# Patient Record
Sex: MALE | Race: BLACK OR AFRICAN AMERICAN | NOT HISPANIC OR LATINO | Employment: OTHER | URBAN - METROPOLITAN AREA
[De-identification: names, ages, dates, MRNs, and addresses within clinical notes are randomized per-mention and may not be internally consistent; named-entity substitution may affect disease eponyms.]

---

## 2017-09-07 ENCOUNTER — GENERIC CONVERSION - ENCOUNTER (OUTPATIENT)
Dept: OTHER | Facility: OTHER | Age: 62
End: 2017-09-07

## 2017-09-16 ENCOUNTER — HOSPITAL ENCOUNTER (EMERGENCY)
Facility: HOSPITAL | Age: 62
Discharge: HOME | End: 2017-09-16
Admitting: EMERGENCY MEDICINE
Payer: COMMERCIAL

## 2017-09-16 VITALS
DIASTOLIC BLOOD PRESSURE: 83 MMHG | WEIGHT: 260 LBS | OXYGEN SATURATION: 94 % | RESPIRATION RATE: 16 BRPM | HEART RATE: 96 BPM | SYSTOLIC BLOOD PRESSURE: 142 MMHG | TEMPERATURE: 97.8 F

## 2017-09-16 DIAGNOSIS — H60.90 OTITIS EXTERNA: Primary | ICD-10-CM

## 2017-09-16 PROCEDURE — 99282 EMERGENCY DEPT VISIT SF MDM: CPT

## 2017-09-16 RX ORDER — CIPROFLOXACIN AND DEXAMETHASONE 3; 1 MG/ML; MG/ML
4 SUSPENSION/ DROPS AURICULAR (OTIC) 2 TIMES DAILY
Status: DISCONTINUED | OUTPATIENT
Start: 2017-09-16 | End: 2017-09-16 | Stop reason: HOSPADM

## 2017-09-16 RX ORDER — IBUPROFEN 400 MG/1
400 TABLET ORAL ONCE
Status: COMPLETED | OUTPATIENT
Start: 2017-09-16 | End: 2017-09-16

## 2017-09-16 RX ADMIN — CIPROFLOXACIN AND DEXAMETHASONE 4 DROP: 3; 1 SUSPENSION/ DROPS AURICULAR (OTIC) at 05:10

## 2017-09-16 RX ADMIN — IBUPROFEN 400 MG: 400 TABLET, FILM COATED ORAL at 05:09

## 2021-01-12 ENCOUNTER — TELEPHONE (OUTPATIENT)
Dept: PHYSICAL THERAPY | Facility: OTHER | Age: 66
End: 2021-01-12

## 2021-01-12 NOTE — TELEPHONE ENCOUNTER
Called patient per  left 1/11/21  Voice message left for patient to call back  Phone number and hours of business provided  Patient informed that we are currently working remotely and that calls from us will be coming through as [facility] phone numbers

## 2021-01-18 DIAGNOSIS — Z01.20 ENCOUNTER FOR DENTAL EXAMINATION: ICD-10-CM

## 2021-06-03 ENCOUNTER — TELEPHONE (OUTPATIENT)
Dept: INTERNAL MEDICINE CLINIC | Facility: CLINIC | Age: 66
End: 2021-06-03

## 2021-06-03 NOTE — TELEPHONE ENCOUNTER
LMOM for pt to call back and verify insurance info , current ins on file is coming up inactive and needs to be corrected before upcoming appointment

## 2021-06-23 ENCOUNTER — OFFICE VISIT (OUTPATIENT)
Dept: DENTISTRY | Facility: CLINIC | Age: 66
End: 2021-06-23

## 2021-06-23 VITALS — HEART RATE: 89 BPM | DIASTOLIC BLOOD PRESSURE: 103 MMHG | SYSTOLIC BLOOD PRESSURE: 164 MMHG | TEMPERATURE: 96.6 F

## 2021-06-23 DIAGNOSIS — K08.402: Primary | ICD-10-CM

## 2021-06-23 PROCEDURE — WIS5000 PRELIMINARY IMPRESSIONS: Performed by: DENTIST

## 2021-06-23 RX ORDER — AMLODIPINE BESYLATE AND BENAZEPRIL HYDROCHLORIDE 10; 20 MG/1; MG/1
1 CAPSULE ORAL DAILY
COMMUNITY
End: 2021-09-21 | Stop reason: HOSPADM

## 2021-06-23 RX ORDER — LISINOPRIL 20 MG/1
20 TABLET ORAL DAILY
COMMUNITY

## 2021-06-23 RX ORDER — TRAMADOL HYDROCHLORIDE 50 MG/1
50 TABLET ORAL EVERY 8 HOURS PRN
COMMUNITY

## 2021-06-23 NOTE — PROGRESS NOTES
Removable    Pt presents for a denture visit and gave verbal consent for treatment:    Reviewed health history - Pt is ASA II    Explained denture making process to patient, including the necessary number of visits and timeframe to make denture  Reviewed denture expectations, adjustment period, and hygiene  Pt is interested in resin based partial as he has had a difficult time with his previous metal based partial  Pt has flared teeth and undercuts would make metal partial fabrication difficult  Recommend resin based partial  Pt also has large arches  Upper and lower alginate impressions made using metal trays  Will ask for custom tray fabrication as a final impression would be needed to create a good long lasting prosthesis due to arch anatomy       NV: Border molding and final impression for resin based partials maxillary and mandibular

## 2021-08-18 ENCOUNTER — OFFICE VISIT (OUTPATIENT)
Dept: DENTISTRY | Facility: CLINIC | Age: 66
End: 2021-08-18

## 2021-08-18 VITALS — DIASTOLIC BLOOD PRESSURE: 99 MMHG | HEART RATE: 89 BPM | TEMPERATURE: 96.4 F | SYSTOLIC BLOOD PRESSURE: 158 MMHG

## 2021-08-18 DIAGNOSIS — K08.122: Primary | ICD-10-CM

## 2021-08-18 PROCEDURE — WIS5001 FINAL IMPRESSIONS DENTURE: Performed by: DENTIST

## 2021-08-18 NOTE — PROGRESS NOTES
*pt agrees to final impressions; requests no metal in partials  Would like resin base*  Pmed Hx- no changes;  ASA 2  Pain Scale 0  Ex- partially edentulous Max Alex arches;       - no soft tissues abnormalities; Pt specifically wants no metal in partials  Tx-Final Impressions for partials Max/Alex with PVS ; Bite; Very large arches  NV- return for try in/Verify bite and shade selection prior to going completion  ( any resident)

## 2021-09-15 ENCOUNTER — HOSPITAL ENCOUNTER (INPATIENT)
Facility: HOSPITAL | Age: 66
LOS: 6 days | Discharge: HOME | DRG: 885 | End: 2021-09-21
Attending: STUDENT IN AN ORGANIZED HEALTH CARE EDUCATION/TRAINING PROGRAM | Admitting: STUDENT IN AN ORGANIZED HEALTH CARE EDUCATION/TRAINING PROGRAM
Payer: COMMERCIAL

## 2021-09-15 ENCOUNTER — HOSPITAL ENCOUNTER (EMERGENCY)
Facility: HOSPITAL | Age: 66
End: 2021-09-15
Attending: EMERGENCY MEDICINE
Payer: COMMERCIAL

## 2021-09-15 VITALS
SYSTOLIC BLOOD PRESSURE: 139 MMHG | DIASTOLIC BLOOD PRESSURE: 87 MMHG | HEART RATE: 97 BPM | OXYGEN SATURATION: 96 % | RESPIRATION RATE: 18 BRPM | TEMPERATURE: 98.6 F

## 2021-09-15 DIAGNOSIS — I10 ESSENTIAL HYPERTENSION: ICD-10-CM

## 2021-09-15 DIAGNOSIS — M54.50 CHRONIC LOW BACK PAIN: ICD-10-CM

## 2021-09-15 DIAGNOSIS — G89.29 CHRONIC LOW BACK PAIN: ICD-10-CM

## 2021-09-15 DIAGNOSIS — I48.91 ATRIAL FIBRILLATION (HCC): Primary | ICD-10-CM

## 2021-09-15 DIAGNOSIS — F32.A DEPRESSION: ICD-10-CM

## 2021-09-15 DIAGNOSIS — F31.4 BIPOLAR 1 DISORDER, DEPRESSED, SEVERE (HCC): ICD-10-CM

## 2021-09-15 DIAGNOSIS — F32.A DEPRESSION: Primary | ICD-10-CM

## 2021-09-15 DIAGNOSIS — R45.851 SUICIDAL IDEATIONS: ICD-10-CM

## 2021-09-15 LAB
ALBUMIN SERPL BCP-MCNC: 3.9 G/DL (ref 3.5–5)
ALP SERPL-CCNC: 58 U/L (ref 46–116)
ALT SERPL W P-5'-P-CCNC: 30 U/L (ref 12–78)
AMPHETAMINES SERPL QL SCN: NEGATIVE
ANION GAP SERPL CALCULATED.3IONS-SCNC: 5 MMOL/L (ref 4–13)
APAP SERPL-MCNC: <2 UG/ML (ref 10–20)
AST SERPL W P-5'-P-CCNC: 19 U/L (ref 5–45)
ATRIAL RATE: 208 BPM
BACTERIA UR QL AUTO: ABNORMAL /HPF
BARBITURATES UR QL: NEGATIVE
BASOPHILS # BLD AUTO: 0.03 THOUSANDS/ΜL (ref 0–0.1)
BASOPHILS NFR BLD AUTO: 0 % (ref 0–1)
BENZODIAZ UR QL: NEGATIVE
BILIRUB SERPL-MCNC: 0.68 MG/DL (ref 0.2–1)
BILIRUB UR QL STRIP: NEGATIVE
BUN SERPL-MCNC: 9 MG/DL (ref 5–25)
CALCIUM SERPL-MCNC: 8.5 MG/DL (ref 8.3–10.1)
CHLORIDE SERPL-SCNC: 109 MMOL/L (ref 100–108)
CLARITY UR: CLEAR
CO2 SERPL-SCNC: 25 MMOL/L (ref 21–32)
COCAINE UR QL: NEGATIVE
COLOR UR: YELLOW
CREAT SERPL-MCNC: 0.72 MG/DL (ref 0.6–1.3)
EOSINOPHIL # BLD AUTO: 0.12 THOUSAND/ΜL (ref 0–0.61)
EOSINOPHIL NFR BLD AUTO: 2 % (ref 0–6)
ERYTHROCYTE [DISTWIDTH] IN BLOOD BY AUTOMATED COUNT: 12.9 % (ref 11.6–15.1)
ETHANOL EXG-MCNC: 0 MG/DL
ETHANOL SERPL-MCNC: <3 MG/DL (ref 0–3)
GFR SERPL CREATININE-BSD FRML MDRD: 111 ML/MIN/1.73SQ M
GLUCOSE SERPL-MCNC: 152 MG/DL (ref 65–140)
GLUCOSE UR STRIP-MCNC: NEGATIVE MG/DL
HCT VFR BLD AUTO: 43.5 % (ref 36.5–49.3)
HGB BLD-MCNC: 14.8 G/DL (ref 12–17)
HGB UR QL STRIP.AUTO: NEGATIVE
HYALINE CASTS #/AREA URNS LPF: ABNORMAL /LPF
IMM GRANULOCYTES # BLD AUTO: 0.02 THOUSAND/UL (ref 0–0.2)
IMM GRANULOCYTES NFR BLD AUTO: 0 % (ref 0–2)
KETONES UR STRIP-MCNC: NEGATIVE MG/DL
LEUKOCYTE ESTERASE UR QL STRIP: NEGATIVE
LYMPHOCYTES # BLD AUTO: 1.67 THOUSANDS/ΜL (ref 0.6–4.47)
LYMPHOCYTES NFR BLD AUTO: 23 % (ref 14–44)
MCH RBC QN AUTO: 29 PG (ref 26.8–34.3)
MCHC RBC AUTO-ENTMCNC: 34 G/DL (ref 31.4–37.4)
MCV RBC AUTO: 85 FL (ref 82–98)
METHADONE UR QL: NEGATIVE
MONOCYTES # BLD AUTO: 0.73 THOUSAND/ΜL (ref 0.17–1.22)
MONOCYTES NFR BLD AUTO: 10 % (ref 4–12)
NEUTROPHILS # BLD AUTO: 4.57 THOUSANDS/ΜL (ref 1.85–7.62)
NEUTS SEG NFR BLD AUTO: 65 % (ref 43–75)
NITRITE UR QL STRIP: NEGATIVE
NON-SQ EPI CELLS URNS QL MICRO: ABNORMAL /HPF
NRBC BLD AUTO-RTO: 0 /100 WBCS
OPIATES UR QL SCN: NEGATIVE
OXYCODONE+OXYMORPHONE UR QL SCN: NEGATIVE
PCP UR QL: NEGATIVE
PH UR STRIP.AUTO: 6 [PH]
PLATELET # BLD AUTO: 207 THOUSANDS/UL (ref 149–390)
PMV BLD AUTO: 9.5 FL (ref 8.9–12.7)
POTASSIUM SERPL-SCNC: 3.7 MMOL/L (ref 3.5–5.3)
PROT SERPL-MCNC: 7.2 G/DL (ref 6.4–8.2)
PROT UR STRIP-MCNC: ABNORMAL MG/DL
QRS AXIS: -31 DEGREES
QRSD INTERVAL: 146 MS
QT INTERVAL: 394 MS
QTC INTERVAL: 479 MS
RBC # BLD AUTO: 5.1 MILLION/UL (ref 3.88–5.62)
RBC #/AREA URNS AUTO: ABNORMAL /HPF
SALICYLATES SERPL-MCNC: <3 MG/DL (ref 3–20)
SARS-COV-2 RNA RESP QL NAA+PROBE: NEGATIVE
SODIUM SERPL-SCNC: 139 MMOL/L (ref 136–145)
SP GR UR STRIP.AUTO: 1.02 (ref 1–1.03)
T WAVE AXIS: 29 DEGREES
T4 FREE SERPL-MCNC: 0.92 NG/DL (ref 0.76–1.46)
THC UR QL: NEGATIVE
TSH SERPL DL<=0.05 MIU/L-ACNC: 0.31 UIU/ML (ref 0.36–3.74)
UROBILINOGEN UR QL STRIP.AUTO: 0.2 E.U./DL
VENTRICULAR RATE: 89 BPM
WBC # BLD AUTO: 7.14 THOUSAND/UL (ref 4.31–10.16)
WBC #/AREA URNS AUTO: ABNORMAL /HPF

## 2021-09-15 PROCEDURE — 81001 URINALYSIS AUTO W/SCOPE: CPT | Performed by: EMERGENCY MEDICINE

## 2021-09-15 PROCEDURE — 36415 COLL VENOUS BLD VENIPUNCTURE: CPT | Performed by: EMERGENCY MEDICINE

## 2021-09-15 PROCEDURE — 1124F ACP DISCUSS-NO DSCNMKR DOCD: CPT | Performed by: STUDENT IN AN ORGANIZED HEALTH CARE EDUCATION/TRAINING PROGRAM

## 2021-09-15 PROCEDURE — 80307 DRUG TEST PRSMV CHEM ANLYZR: CPT | Performed by: EMERGENCY MEDICINE

## 2021-09-15 PROCEDURE — 84443 ASSAY THYROID STIM HORMONE: CPT | Performed by: EMERGENCY MEDICINE

## 2021-09-15 PROCEDURE — 82077 ASSAY SPEC XCP UR&BREATH IA: CPT | Performed by: EMERGENCY MEDICINE

## 2021-09-15 PROCEDURE — 99285 EMERGENCY DEPT VISIT HI MDM: CPT

## 2021-09-15 PROCEDURE — U0003 INFECTIOUS AGENT DETECTION BY NUCLEIC ACID (DNA OR RNA); SEVERE ACUTE RESPIRATORY SYNDROME CORONAVIRUS 2 (SARS-COV-2) (CORONAVIRUS DISEASE [COVID-19]), AMPLIFIED PROBE TECHNIQUE, MAKING USE OF HIGH THROUGHPUT TECHNOLOGIES AS DESCRIBED BY CMS-2020-01-R: HCPCS | Performed by: EMERGENCY MEDICINE

## 2021-09-15 PROCEDURE — 93005 ELECTROCARDIOGRAM TRACING: CPT

## 2021-09-15 PROCEDURE — 85025 COMPLETE CBC W/AUTO DIFF WBC: CPT | Performed by: EMERGENCY MEDICINE

## 2021-09-15 PROCEDURE — 80179 DRUG ASSAY SALICYLATE: CPT | Performed by: EMERGENCY MEDICINE

## 2021-09-15 PROCEDURE — 93010 ELECTROCARDIOGRAM REPORT: CPT | Performed by: INTERNAL MEDICINE

## 2021-09-15 PROCEDURE — 83036 HEMOGLOBIN GLYCOSYLATED A1C: CPT | Performed by: PHYSICIAN ASSISTANT

## 2021-09-15 PROCEDURE — 80143 DRUG ASSAY ACETAMINOPHEN: CPT | Performed by: EMERGENCY MEDICINE

## 2021-09-15 PROCEDURE — 84439 ASSAY OF FREE THYROXINE: CPT | Performed by: EMERGENCY MEDICINE

## 2021-09-15 PROCEDURE — 80053 COMPREHEN METABOLIC PANEL: CPT | Performed by: EMERGENCY MEDICINE

## 2021-09-15 PROCEDURE — 82075 ASSAY OF BREATH ETHANOL: CPT | Performed by: EMERGENCY MEDICINE

## 2021-09-15 PROCEDURE — U0005 INFEC AGEN DETEC AMPLI PROBE: HCPCS | Performed by: EMERGENCY MEDICINE

## 2021-09-15 PROCEDURE — 99285 EMERGENCY DEPT VISIT HI MDM: CPT | Performed by: EMERGENCY MEDICINE

## 2021-09-15 RX ORDER — OLANZAPINE 5 MG/1
5 TABLET ORAL
Status: CANCELLED | OUTPATIENT
Start: 2021-09-15

## 2021-09-15 RX ORDER — OLANZAPINE 10 MG/1
5 INJECTION, POWDER, LYOPHILIZED, FOR SOLUTION INTRAMUSCULAR
Status: CANCELLED | OUTPATIENT
Start: 2021-09-15

## 2021-09-15 RX ORDER — LORAZEPAM 2 MG/ML
1 INJECTION INTRAMUSCULAR
Status: CANCELLED | OUTPATIENT
Start: 2021-09-15

## 2021-09-15 RX ORDER — OLANZAPINE 10 MG/1
5 INJECTION, POWDER, LYOPHILIZED, FOR SOLUTION INTRAMUSCULAR
Status: DISCONTINUED | OUTPATIENT
Start: 2021-09-15 | End: 2021-09-21 | Stop reason: HOSPADM

## 2021-09-15 RX ORDER — ACETAMINOPHEN 325 MG/1
975 TABLET ORAL EVERY 6 HOURS PRN
Status: CANCELLED | OUTPATIENT
Start: 2021-09-15

## 2021-09-15 RX ORDER — OLANZAPINE 2.5 MG/1
2.5 TABLET ORAL
Status: DISCONTINUED | OUTPATIENT
Start: 2021-09-15 | End: 2021-09-21 | Stop reason: HOSPADM

## 2021-09-15 RX ORDER — BENZTROPINE MESYLATE 0.5 MG/1
0.5 TABLET ORAL
Status: CANCELLED | OUTPATIENT
Start: 2021-09-15

## 2021-09-15 RX ORDER — LORAZEPAM 1 MG/1
1 TABLET ORAL
Status: DISCONTINUED | OUTPATIENT
Start: 2021-09-15 | End: 2021-09-21 | Stop reason: HOSPADM

## 2021-09-15 RX ORDER — ACETAMINOPHEN 325 MG/1
650 TABLET ORAL EVERY 4 HOURS PRN
Status: DISCONTINUED | OUTPATIENT
Start: 2021-09-15 | End: 2021-09-21 | Stop reason: HOSPADM

## 2021-09-15 RX ORDER — ACETAMINOPHEN 325 MG/1
650 TABLET ORAL EVERY 4 HOURS PRN
Status: CANCELLED | OUTPATIENT
Start: 2021-09-15

## 2021-09-15 RX ORDER — TRAMADOL HYDROCHLORIDE 50 MG/1
50 TABLET ORAL EVERY 8 HOURS PRN
Status: DISCONTINUED | OUTPATIENT
Start: 2021-09-15 | End: 2021-09-21 | Stop reason: HOSPADM

## 2021-09-15 RX ORDER — OLANZAPINE 2.5 MG/1
2.5 TABLET ORAL
Status: CANCELLED | OUTPATIENT
Start: 2021-09-15

## 2021-09-15 RX ORDER — OLANZAPINE 5 MG/1
5 TABLET ORAL
Status: DISCONTINUED | OUTPATIENT
Start: 2021-09-15 | End: 2021-09-21 | Stop reason: HOSPADM

## 2021-09-15 RX ORDER — TRAZODONE HYDROCHLORIDE 50 MG/1
50 TABLET ORAL
Status: CANCELLED | OUTPATIENT
Start: 2021-09-15

## 2021-09-15 RX ORDER — LORAZEPAM 0.5 MG/1
0.5 TABLET ORAL
Status: DISCONTINUED | OUTPATIENT
Start: 2021-09-15 | End: 2021-09-21 | Stop reason: HOSPADM

## 2021-09-15 RX ORDER — LORAZEPAM 0.5 MG/1
0.5 TABLET ORAL
Status: CANCELLED | OUTPATIENT
Start: 2021-09-15

## 2021-09-15 RX ORDER — LORAZEPAM 2 MG/ML
1 INJECTION INTRAMUSCULAR
Status: DISCONTINUED | OUTPATIENT
Start: 2021-09-15 | End: 2021-09-21 | Stop reason: HOSPADM

## 2021-09-15 RX ORDER — TRAMADOL HYDROCHLORIDE 50 MG/1
50 TABLET ORAL ONCE
Status: COMPLETED | OUTPATIENT
Start: 2021-09-15 | End: 2021-09-15

## 2021-09-15 RX ORDER — TRAZODONE HYDROCHLORIDE 50 MG/1
50 TABLET ORAL
Status: DISCONTINUED | OUTPATIENT
Start: 2021-09-15 | End: 2021-09-21 | Stop reason: HOSPADM

## 2021-09-15 RX ORDER — LORAZEPAM 0.5 MG/1
0.5 TABLET ORAL EVERY 6 HOURS PRN
Status: CANCELLED | OUTPATIENT
Start: 2021-09-15

## 2021-09-15 RX ORDER — LORAZEPAM 0.5 MG/1
1 TABLET ORAL
Status: CANCELLED | OUTPATIENT
Start: 2021-09-15

## 2021-09-15 RX ORDER — ACETAMINOPHEN 325 MG/1
975 TABLET ORAL EVERY 6 HOURS PRN
Status: DISCONTINUED | OUTPATIENT
Start: 2021-09-15 | End: 2021-09-15 | Stop reason: SDUPTHER

## 2021-09-15 RX ORDER — LORAZEPAM 0.5 MG/1
0.5 TABLET ORAL EVERY 6 HOURS PRN
Status: DISCONTINUED | OUTPATIENT
Start: 2021-09-15 | End: 2021-09-21 | Stop reason: HOSPADM

## 2021-09-15 RX ORDER — TRAMADOL HYDROCHLORIDE 50 MG/1
50 TABLET ORAL EVERY 8 HOURS PRN
Status: DISCONTINUED | OUTPATIENT
Start: 2021-09-15 | End: 2021-09-15

## 2021-09-15 RX ADMIN — ACETAMINOPHEN 650 MG: 325 TABLET ORAL at 23:37

## 2021-09-15 RX ADMIN — TRAMADOL HYDROCHLORIDE 50 MG: 50 TABLET, FILM COATED ORAL at 19:04

## 2021-09-15 RX ADMIN — TRAMADOL HYDROCHLORIDE 50 MG: 50 TABLET, FILM COATED ORAL at 09:52

## 2021-09-15 NOTE — ED NOTES
Pt reports increased depression and anxiety  He sees Dr Elvis Mulligan outpatient but stopped taking psych meds about one month ago after many years of compliance  Pt stopped the meds because he was tired of taking so many pills each day  Pt admits his increased depression is most likely due to stopping the meds  Pt lives alone and is retired  Pt denies homicidal ideations or hallucinations but admits to feeling suicidal without a specific plan  Pt has thought of different ways to kill himself but hasn't settled on one specific plan  Pt reports poor sleep and fair appetite  Pt denies D&A use  He is diagnosed bipolar and PTSD  Pt has type 2 diabetes which he reports as controlled by medication  Pt is requesting inpatient psych and will sign a 201  He was inpatient many years ago after an OD attempt  Pt is calm and cooperative

## 2021-09-15 NOTE — ED NOTES
Pt has Medicare A&B as primary  No precert required  COB was completed with Garrison Suarez at [facility]

## 2021-09-15 NOTE — ED ATTENDING ATTESTATION
9/15/2021  ILenora MD, saw and evaluated the patient  I have discussed the patient with the resident/non-physician practitioner and agree with the resident's/non-physician practitioner's findings, Plan of Care, and MDM as documented in the resident's/non-physician practitioner's note, except where noted  All available labs and Radiology studies were reviewed  I was present for key portions of any procedure(s) performed by the resident/non-physician practitioner and I was immediately available to provide assistance  At this point I agree with the current assessment done in the Emergency Department  I have conducted an independent evaluation of this patient a history and physical is as follows:   The patient presents for evaluation of depression and suicidal thoughts he has a history of bipolar disorder and PTSD he is not taking his medications patient has a history of chronic back pain no new symptoms denies IV drug abuse denies fever chills denies weight loss  No abdominal pain patient denies chest pain or chest pressure denies shortness of breath denies leg pain or leg swelling  Patient is not taking his psychiatric medications denies alcohol use  Exam well-appearing no acute distress vital signs are stable he is afebrile HEENT exam is unremarkable lungs clear heart irregular rate normal abdomen soft positive bowel sounds nontender back there is some mild paraspinal muscular tenderness normal neurologic exam  Patient is not hallucinating  Crisis consult lab workup  ED Course    EKG shows right bundle-branch block atrial fibrillation rate controlled 85 no acute ischemic changes  Lab workup is unremarkable  Urine drug screen negative  COVID negative  Patient has signed a [address]  Procedures

## 2021-09-15 NOTE — PLAN OF CARE
Problem: Ineffective Coping  Goal: Cooperates with admission process  Description: Interventions:   - Complete admission process  Outcome: Not Progressing  Goal: Identifies ineffective coping skills  Outcome: Not Progressing  Goal: Identifies healthy coping skills  Outcome: Not Progressing  Goal: Demonstrates healthy coping skills  Outcome: Not Progressing  Goal: Participates in unit activities  Description: Interventions:  - Provide therapeutic environment   - Provide required programming   - Redirect inappropriate behaviors   Outcome: Not Progressing  Goal: Patient/Family participate in treatment and DC plans  Description: Interventions:  - Provide therapeutic environment  Outcome: Not Progressing  Goal: Patient/Family verbalizes awareness of resources  Outcome: Not Progressing  Goal: Understands least restrictive measures  Description: Interventions:  - Utilize least restrictive behavior  Outcome: Not Progressing  Goal: Free from restraint events  Description: - Utilize least restrictive measures   - Provide behavioral interventions   - Redirect inappropriate behaviors   Outcome: Not Progressing     Problem: Risk for Self Injury/Neglect  Goal: Treatment Goal: Remain safe during length of stay, learn and adopt new coping skills, and be free of self-injurious ideation, impulses and acts at the time of discharge  Outcome: Not Progressing  Goal: Verbalize thoughts and feelings  Description: Interventions:  - Assess and re-assess patient's lethality and potential for self-injury  - Engage patient in 1:1 interactions, daily, for a minimum of 15 minutes  - Encourage patient to express feelings, fears, frustrations, hopes  - Establish rapport/trust with patient   Outcome: Not Progressing  Goal: Refrain from harming self  Description: Interventions:  - Monitor patient closely, per order  - Develop a trusting relationship  - Supervise medication ingestion, monitor effects and side effects   Outcome: Not Progressing  Goal: Attend and participate in unit activities, including therapeutic, recreational, and educational groups  Description: Interventions:  - Provide therapeutic and educational activities daily, encourage attendance and participation, and document same in the medical record  - Obtain collateral information, encourage visitation and family involvement in care   Outcome: Not Progressing  Goal: Recognize maladaptive responses and adopt new coping mechanisms  Outcome: Not Progressing  Goal: Complete daily ADLs, including personal hygiene independently, as able  Description: Interventions:  - Observe, teach, and assist patient with ADLS  - Monitor and promote a balance of rest/activity, with adequate nutrition and elimination  Outcome: Not Progressing     Problem: Depression  Goal: Treatment Goal: Demonstrate behavioral control of depressive symptoms, verbalize feelings of improved mood/affect, and adopt new coping skills prior to discharge  Outcome: Not Progressing  Goal: Refrain from isolation  Description: Interventions:  - Develop a trusting relationship   - Encourage socialization   Outcome: Not Progressing  Goal: Refrain from self-neglect  Outcome: Not Progressing  Goal: Complete daily ADLs, including personal hygiene independently, as able  Description: Interventions:  - Observe, teach, and assist patient with ADLS  -  Monitor and promote a balance of rest/activity, with adequate nutrition and elimination   Outcome: Not Progressing     Problem: Anxiety  Goal: Anxiety is at manageable level  Description: Interventions:  - Assess and monitor patient's anxiety level  - Monitor for signs and symptoms (heart palpitations, chest pain, shortness of breath, headaches, nausea, feeling jumpy, restlessness, irritable, apprehensive)  - Collaborate with interdisciplinary team and initiate plan and interventions as ordered    - [place] patient to unit/surroundings  - Explain treatment plan  - Encourage participation in care  - Encourage verbalization of concerns/fears  - Identify coping mechanisms  - Assist in developing anxiety-reducing skills  - Administer/offer alternative therapies  - Limit or eliminate stimulants  Outcome: Not Progressing

## 2021-09-15 NOTE — EMTALA/ACUTE CARE TRANSFER
[facility] [address]  Dept: [phone]      ARXNBU TRANSFER CONSENT    NAME Carin Maya                                         1955                              MRN [id number]    I have been informed of my rights regarding examination, treatment, and transfer   by Dr Ciara Grayson MD    Benefits: Specialized equipment and/or services available at the receiving facility (Include comment)________________________    Risks: Potential for delay in receiving treatment      Transfer Request   I acknowledge that my medical condition has been evaluated and explained to me by the emergency department physician or other qualified medical person and/or my attending physician who has recommended and offered to me further medical examination and treatment  I understand the Hospital's obligation with respect to the treatment and stabilization of my emergency medical condition  I nevertheless request to be transferred  I release the Hospital, the doctor, and any other persons caring for me from all responsibility or liability for any injury or ill effects that may result from my transfer and agree to accept all responsibility for the consequences of my choice to transfer, rather than receive stabilizing treatment at the Hospital  I understand that because the transfer is my request, my insurance may not provide reimbursement for the services  The Hospital will assist and direct me and my family in how to make arrangements for transfer, but the hospital is not liable for any fees charged by the transport service  In spite of this understanding, I refuse to consent to further medical examination and treatment which has been offered to me, and request transfer to  [facility] [address], [address] : [facility]  I authorize the performance of emergency medical procedures and treatments upon me in both transit and upon arrival at the receiving facility  Additionally, I authorize the release of any and all medical records to the receiving facility and request they be transported with me, if possible  I authorize the performance of emergency medical procedures and treatments upon me in both transit and upon arrival at the receiving facility  Additionally, I authorize the release of any and all medical records to the receiving facility and request they be transported with me, if possible  I understand that the safest mode of transportation during a medical emergency is an ambulance and that the Hospital advocates the use of this mode of transport  Risks of traveling to the receiving facility by car, including absence of medical control, life sustaining equipment, such as oxygen, and medical personnel has been explained to me and I fully understand them  (JOSE M CORRECT BOX BELOW)  [  ]  I consent to the stated transfer and to be transported by ambulance/helicopter  [  ]  I consent to the stated transfer, but refuse transportation by ambulance and accept full responsibility for my transportation by car  I understand the risks of non-ambulance transfers and I exonerate the Hospital and its staff from any deterioration in my condition that results from this refusal     X___________________________________________    DATE  09/15/21  TIME________  Signature of patient or legally responsible individual signing on patient behalf           RELATIONSHIP TO PATIENT_________________________          Provider Certification    NAME Alaina Gama                                        [facility] 1955                              MRN [id number]    A medical screening exam was performed on the above named patient  Based on the examination:    Condition Necessitating Transfer The primary encounter diagnosis was Depression  A diagnosis of Suicidal ideations was also pertinent to this visit      Patient Condition: The patient has been stabilized such that within reasonable medical probability, no material deterioration of the patient condition or the condition of the unborn child(ann marie) is likely to result from the transfer    Reason for Transfer: Level of Care needed not available at this facility    Transfer Requirements: [address]   · Space available and qualified personnel available for treatment as acknowledged by    · Agreed to accept transfer and to provide appropriate medical treatment as acknowledged by       Shlomo  · Appropriate medical records of the examination and treatment of the patient are provided at the time of transfer   [address] [facility] _______  · Transfer will be performed by qualified personnel from [address] [place] [address]  and appropriate transfer equipment as required, including the use of necessary and appropriate life support measures  Provider Certification: I have examined the patient and explained the following risks and benefits of being transferred/refusing transfer to the patient/family:  General risk, such as traffic hazards, adverse weather conditions, rough terrain or turbulence, possible failure of equipment (including vehicle or aircraft), or consequences of actions of persons outside the control of the transport personnel      Based on these reasonable risks and benefits to the patient and/or the unborn child(ann marie), and based upon the information available at the time of the patients examination, I certify that the medical benefits reasonably to be expected from the provision of appropriate medical treatments at another medical facility outweigh the increasing risks, if any, to the individuals medical condition, and in the case of labor to the unborn child, from effecting the transfer      X____________________________________________ DATE 09/15/21        TIME_______      ORIGINAL - SEND TO MEDICAL RECORDS   COPY - SEND WITH PATIENT DURING TRANSFER

## 2021-09-15 NOTE — ED NOTES
Patient is accepted at [facility]  Patient is accepted by Dr Annette Dash per [facility] time to transport       Nurse report is to be called to [phone] prior to patient transfer

## 2021-09-15 NOTE — ED PROVIDER NOTES
History  Chief Complaint   Patient presents with    Psychiatric Evaluation     pt states he started having "bad thoughts" yesterday, has thoughts of harming himself but no plan; no thoughts of harming others; has been here before for similar problem, says he wants someone to talk to  states hx of anxiety, depression, bipolar disorder, PTSD     77-year-old male history of reported bipolar disorder, PTSD, anxiety depression presents with thoughts of self-harm  Previously on psychiatric meds has not been taking them the past month or so  Regarding self-harm has considered cutting, previously overdosed on medications  Denies any drug or alcohol use  He has no physical complaints other than chronic low back pain unchanged  Denies any hallucinations, thoughts of harming others  Per chart has history of diabetes not on insulin, does take amlodipine and ACE-inhibitor  Requesting to speak with crisis, has been helpful past for him  ROS  Constitutional: denies fevers, chills, sweats  Eyes: denies eye pain  ENT: denies ear, sinus, throat pain  CV: denies chest pain  Resp: denies cough, SOB  GI: denies abdominal pain, nausea, vomiting, diarrhea, bloody or dark stool  : denies difficulty urinating, flank pain  MSK: denies neck  Neuro: denies headache, new numbness, tingling, weakness  Allergy/Immuno: denies known drug allergies, recent illness, known sick contacts       Triage vital signs reviewed    Physical Exam  Const: alert, no acute distress, non-toxic appearing, no diaphoresis  Appears stated age, well-developed  Obese  Eyes: no conjunctival injection, discharge or icterus  Head: normocephalic  Ears: auricles normal   Nose: normal  Mouth/throat: clear, moist   Neck: normal ROM, supple and without rigidity   CV: normal rate   Extremities warm and well-perfused   Resp: breathing unlabored, non-stridulous   Abdomen: soft, non-tender, non-distended  MSK: no gross deformities appreciated  Skin: warm and dry with rapid capillary refill  Neuro: CNs II-XII grossly intact  Oriented x 4  Psych: pleasant, cooperative            Prior to Admission Medications   Prescriptions Last Dose Informant Patient Reported? Taking?   lisinopril (ZESTRIL) 20 mg tablet 9/15/2021 at Unknown time Self Yes Yes   Sig: Take 20 mg by mouth daily   traMADol (ULTRAM) 50 mg tablet 9/14/2021 at Unknown time Self Yes Yes   Sig: Take 50 mg by mouth every 8 (eight) hours as needed for moderate pain      Facility-Administered Medications: None       Past Medical History:   Diagnosis Date    Anxiety     Depression     Diabetes mellitus ([facility] [address] )     Hypertension     PTSD (post-traumatic stress disorder)        History reviewed  No pertinent surgical history  History reviewed  No pertinent family history  I have reviewed and agree with the history as documented  E-Cigarette/Vaping    E-Cigarette Use Never User      E-Cigarette/Vaping Substances    Nicotine No     THC No     CBD No     Flavoring No     Other No     Unknown No      Social History     Tobacco Use    Smoking status: Former Smoker    Smokeless tobacco: Never Used   Vaping Use    Vaping Use: Never used   Substance Use Topics    Alcohol use:  Yes    Drug use: No        Review of Systems    Physical Exam  ED Triage Vitals [09/15/21 0756]   Temperature Pulse Respirations Blood Pressure SpO2   98 6 °F (37 °C) 68 18 162/97 96 %      Temp Source Heart Rate Source Patient Position - Orthostatic VS BP Location FiO2 (%)   Oral Monitor Sitting Right arm --      Pain Score       7             Orthostatic Vital Signs  Vitals:    09/15/21 0756 09/15/21 1108   BP: 162/97 139/87   Pulse: 68 97   Patient Position - Orthostatic VS: Sitting Lying       Physical Exam    ED Medications  Medications   traMADol (ULTRAM) tablet 50 mg (50 mg Oral Given 9/15/21 0467)       Diagnostic Studies  Results Reviewed     Procedure Component Value Units Date/Time    Rapid drug screen, urine [[id number]]  (Normal) Collected: 09/15/21 0935    Lab Status: Final result Specimen: Urine, Clean Catch Updated: 09/15/21 1136     Amph/Meth UR Negative     Barbiturate Ur Negative     Benzodiazepine Urine Negative     Cocaine Urine Negative     Methadone Urine Negative     Opiate Urine Negative     PCP Ur Negative     THC Urine Negative     Oxycodone Urine Negative    Narrative:      FOR MEDICAL PURPOSES ONLY  IF CONFIRMATION NEEDED PLEASE CONTACT THE LAB WITHIN 5 DAYS  Drug Screen Cutoff Levels:  AMPHETAMINE/METHAMPHETAMINES  1000 ng/mL  BARBITURATES     200 ng/mL  BENZODIAZEPINES     200 ng/mL  COCAINE      300 ng/mL  METHADONE      300 ng/mL  OPIATES      300 ng/mL  PHENCYCLIDINE     25 ng/mL  THC       50 ng/mL  OXYCODONE      100 ng/mL    Novel Coronavirus (Covid-19),PCR [facility] [[id number]]  (Normal) Collected: 09/15/21 0937    Lab Status: Final result Specimen: Nares from Nasopharyngeal Swab Updated: 09/15/21 1100     SARS-CoV-2 Negative    Narrative:           T4, free [[id number]]  (Normal) Collected: 09/15/21 0957    Lab Status: Final result Specimen: Blood from Arm, Right Updated: 09/15/21 1054     Free T4 0 92 ng/dL     TSH, 3rd generation with Free T4 reflex [[id number]]  (Abnormal) Collected: 09/15/21 0957    Lab Status: Final result Specimen: Blood from Arm, Right Updated: 09/15/21 1032     TSH 3RD GENERATON 0 311 uIU/mL     Narrative:      Patients undergoing fluorescein dye angiography may retain small amounts of fluorescein in the body for 48-72 hours post procedure  Samples containing fluorescein can produce falsely depressed TSH values  If the patient had this procedure,a specimen should be resubmitted post fluorescein clearance  Salicylate level [[id number]]  (Abnormal) Collected: 09/15/21 0957    Lab Status: Final result Specimen: Blood from Arm, Right Updated: 85/21/02 7660     Salicylate Lvl <3 mg/dL     Acetaminophen level-If concentration is detectable, please discuss with medical  on call  [[id number]]  (Abnormal) Collected: 09/15/21 0957    Lab Status: Final result Specimen: Blood from Arm, Right Updated: 09/15/21 1026     Acetaminophen Level <2 ug/mL     Comprehensive metabolic panel [[id number]]  (Abnormal) Collected: 09/15/21 0957    Lab Status: Final result Specimen: Blood from Arm, Right Updated: 09/15/21 1026     Sodium 139 mmol/L      Potassium 3 7 mmol/L      Chloride 109 mmol/L      CO2 25 mmol/L      ANION GAP 5 mmol/L      BUN 9 mg/dL      Creatinine 0 72 mg/dL      Glucose 152 mg/dL      Calcium 8 5 mg/dL      AST 19 U/L      ALT 30 U/L      Alkaline Phosphatase 58 U/L      Total Protein 7 2 g/dL      Albumin 3 9 g/dL      Total Bilirubin 0 68 mg/dL      eGFR 111 ml/min/1 73sq m     Narrative:      [place] guidelines for Chronic Kidney Disease (CKD):     Stage 1 with normal or high GFR (GFR > 90 mL/min/1 73 square meters)    Stage 2 Mild CKD (GFR = 60-89 mL/min/1 73 square meters)    Stage 3A Moderate CKD (GFR = 45-59 mL/min/1 73 square meters)    Stage 3B Moderate CKD (GFR = 30-44 mL/min/1 73 square meters)    Stage 4 Severe CKD (GFR = 15-29 mL/min/1 73 square meters)    Stage 5 End Stage CKD (GFR <15 mL/min/1 73 square meters)  Note: GFR calculation is accurate only with a steady state creatinine    Ethanol [[id number]]  (Normal) Collected: 09/15/21 0957    Lab Status: Final result Specimen: Blood from Arm, Right Updated: 09/15/21 1022     Ethanol Lvl <3 mg/dL     Urine Microscopic [[id number]]  (Abnormal) Collected: 09/15/21 0936    Lab Status: Final result Specimen: Urine, Clean Catch Updated: 09/15/21 1010     RBC, UA None Seen /hpf      WBC, UA 2-4 /hpf      Epithelial Cells None Seen /hpf      Bacteria, UA None Seen /hpf      Hyaline Casts, UA 10-25 /lpf     UA w Reflex to Microscopic w Reflex to Culture [[id number]]  (Abnormal) Collected: 09/15/21 0936    Lab Status: Final result Specimen: Urine, Clean Catch Updated: 09/15/21 1007     Color, UA Yellow Clarity, UA Clear     Specific Gravity, UA 1 018     pH, UA 6 0     Leukocytes, UA Negative     Nitrite, UA Negative     Protein,  (2+) mg/dl      Glucose, UA Negative mg/dl      Ketones, UA Negative mg/dl      Urobilinogen, UA 0 2 E U /dl      Bilirubin, UA Negative     Blood, UA Negative    CBC and differential [[id number]] Collected: 09/15/21 0957    Lab Status: Final result Specimen: Blood from Arm, Right Updated: 09/15/21 1005     WBC 7 14 Thousand/uL      RBC 5 10 Million/uL      Hemoglobin 14 8 g/dL      Hematocrit 43 5 %      MCV 85 fL      MCH 29 0 pg      MCHC 34 0 g/dL      RDW 12 9 %      MPV 9 5 fL      Platelets 339 Thousands/uL      nRBC 0 /100 WBCs      Neutrophils Relative 65 %      Immat GRANS % 0 %      Lymphocytes Relative 23 %      Monocytes Relative 10 %      Eosinophils Relative 2 %      Basophils Relative 0 %      Neutrophils Absolute 4 57 Thousands/µL      Immature Grans Absolute 0 02 Thousand/uL      Lymphocytes Absolute 1 67 Thousands/µL      Monocytes Absolute 0 73 Thousand/µL      Eosinophils Absolute 0 12 Thousand/µL      Basophils Absolute 0 03 Thousands/µL     POCT alcohol breath test [[id number]]  (Normal) Resulted: 09/15/21 0959    Lab Status: Final result Updated: 09/15/21 0959     EXTBreath Alcohol 0 000                 No orders to display         Procedures  Procedures      ED Course  ED Course as of Sep 22 1711   Wed Sep 15, 2021   1311 Accepted at [address] [place] [address]                                           MDM  Number of Diagnoses or Management Options  Suicidal ideations  Diagnosis management comments: [facility] psych labs, crisis consult  Patient will sign 201        Disposition  Final diagnoses:   Suicidal ideations     Time reflects when diagnosis was documented in both MDM as applicable and the Disposition within this note     Time User Action Codes Description Comment    9/15/2021  1:01 PM Marie Clark Add [F32 9] Depression     9/15/2021  2:41 PM Bran LUTHER Add [Q43 719] Suicidal ideations       ED Disposition     ED Disposition Condition Date/Time Comment    [facility] to [facility] Sep 15, 2021  2:41 PM Cipriano Castelan should be transferred out to [address] [place] [address] and has been medically cleared  MD Documentation      Most Recent Value   Patient Condition  The patient has been stabilized such that within reasonable medical probability, no material deterioration of the patient condition or the condition of the unborn child(ann marie) is likely to result from the transfer   Reason for Transfer  Level of Care needed not available at this facility   Benefits of Transfer  Specialized equipment and/or services available at the receiving facility (Include comment)________________________   Risks of Transfer  Potential for delay in receiving treatment   Accepting Physician  Oli Rene Name, [address] [place] [address] by [facility] and [facility])  CTS   Sending MD Hope   Provider Certification  General risk, such as traffic hazards, adverse weather conditions, rough terrain or turbulence, possible failure of equipment (including vehicle or aircraft), or consequences of actions of persons outside the control of the transport personnel      RN Documentation      [address] Name, [address] [place] [address] by [facility] and [facility])  CTS      Follow-up Information    None         Discharge Medication List as of 9/15/2021  3:31 PM      CONTINUE these medications which have NOT CHANGED    Details   lisinopril (ZESTRIL) 20 mg tablet Take 20 mg by mouth daily, Historical Med      traMADol (ULTRAM) 50 mg tablet Take 50 mg by mouth every 8 (eight) hours as needed for moderate pain, Historical Med      amLODIPine-benazepril (LOTREL) 10-20 MG per capsule Take 1 capsule by mouth daily, Historical Med           No discharge procedures on file      PDMP Review       Value Time User    PDMP Reviewed  Yes 9/21/2021 10:38 AM Aleida Webster MD ED Provider  Attending physically available and evaluated Jnmeghankassidy Jose MERA managed the patient along with the ED Attending      Electronically Signed by         Carlee Hamilton MD  09/22/21 1986

## 2021-09-16 PROBLEM — G89.29 CHRONIC LOW BACK PAIN: Status: ACTIVE | Noted: 2021-09-16

## 2021-09-16 PROBLEM — I48.91 ATRIAL FIBRILLATION (HCC): Status: ACTIVE | Noted: 2021-09-16

## 2021-09-16 PROBLEM — M54.50 CHRONIC LOW BACK PAIN: Status: ACTIVE | Noted: 2021-09-16

## 2021-09-16 PROBLEM — I10 HYPERTENSION: Status: ACTIVE | Noted: 2021-09-16

## 2021-09-16 PROBLEM — F31.4 BIPOLAR 1 DISORDER, DEPRESSED, SEVERE (HCC): Status: ACTIVE | Noted: 2021-09-16

## 2021-09-16 PROBLEM — Z00.8 MEDICAL CLEARANCE FOR PSYCHIATRIC ADMISSION: Status: ACTIVE | Noted: 2021-09-16

## 2021-09-16 PROBLEM — E11.9 DIABETES MELLITUS (HCC): Status: ACTIVE | Noted: 2021-09-16

## 2021-09-16 LAB
EST. AVERAGE GLUCOSE BLD GHB EST-MCNC: 143 MG/DL
GLUCOSE SERPL-MCNC: 198 MG/DL (ref 65–140)
GLUCOSE SERPL-MCNC: 231 MG/DL (ref 65–140)
GLUCOSE SERPL-MCNC: 245 MG/DL (ref 65–140)
HBA1C MFR BLD: 6.6 %
QRS AXIS: 88 DEGREES
QRSD INTERVAL: 140 MS
QT INTERVAL: 400 MS
QTC INTERVAL: 497 MS
T WAVE AXIS: 65 DEGREES
VENTRICULAR RATE: 93 BPM

## 2021-09-16 PROCEDURE — 99222 1ST HOSP IP/OBS MODERATE 55: CPT | Performed by: STUDENT IN AN ORGANIZED HEALTH CARE EDUCATION/TRAINING PROGRAM

## 2021-09-16 PROCEDURE — 82948 REAGENT STRIP/BLOOD GLUCOSE: CPT

## 2021-09-16 PROCEDURE — 93010 ELECTROCARDIOGRAM REPORT: CPT | Performed by: INTERNAL MEDICINE

## 2021-09-16 PROCEDURE — 93005 ELECTROCARDIOGRAM TRACING: CPT

## 2021-09-16 PROCEDURE — 99222 1ST HOSP IP/OBS MODERATE 55: CPT | Performed by: PHYSICIAN ASSISTANT

## 2021-09-16 RX ORDER — AMLODIPINE BESYLATE 10 MG/1
10 TABLET ORAL DAILY
Status: DISCONTINUED | OUTPATIENT
Start: 2021-09-16 | End: 2021-09-17

## 2021-09-16 RX ORDER — LISINOPRIL 20 MG/1
20 TABLET ORAL DAILY
Status: DISCONTINUED | OUTPATIENT
Start: 2021-09-16 | End: 2021-09-21 | Stop reason: HOSPADM

## 2021-09-16 RX ORDER — GABAPENTIN 300 MG/1
300 CAPSULE ORAL 3 TIMES DAILY
Status: DISCONTINUED | OUTPATIENT
Start: 2021-09-16 | End: 2021-09-21 | Stop reason: HOSPADM

## 2021-09-16 RX ADMIN — GABAPENTIN 300 MG: 300 CAPSULE ORAL at 16:30

## 2021-09-16 RX ADMIN — AMLODIPINE BESYLATE 10 MG: 10 TABLET ORAL at 10:48

## 2021-09-16 RX ADMIN — LURASIDONE HYDROCHLORIDE 20 MG: 20 TABLET, FILM COATED ORAL at 16:31

## 2021-09-16 RX ADMIN — GABAPENTIN 300 MG: 300 CAPSULE ORAL at 21:14

## 2021-09-16 RX ADMIN — INSULIN LISPRO 4 UNITS: 100 INJECTION, SOLUTION INTRAVENOUS; SUBCUTANEOUS at 16:35

## 2021-09-16 RX ADMIN — TRAMADOL HYDROCHLORIDE 50 MG: 50 TABLET, FILM COATED ORAL at 08:21

## 2021-09-16 RX ADMIN — INSULIN LISPRO 4 UNITS: 100 INJECTION, SOLUTION INTRAVENOUS; SUBCUTANEOUS at 21:40

## 2021-09-16 RX ADMIN — ACETAMINOPHEN 650 MG: 325 TABLET ORAL at 13:34

## 2021-09-16 RX ADMIN — TRAMADOL HYDROCHLORIDE 50 MG: 50 TABLET, FILM COATED ORAL at 16:31

## 2021-09-16 RX ADMIN — LISINOPRIL 20 MG: 20 TABLET ORAL at 10:48

## 2021-09-16 RX ADMIN — TRAZODONE HYDROCHLORIDE 50 MG: 50 TABLET ORAL at 21:21

## 2021-09-16 NOTE — ASSESSMENT & PLAN NOTE
· New onset of atrial fibrillation as evidenced by EKG in [facility] yesterday  · Heart rate has been stable since admission to the [facility]  · Repeat EKG and Cardiology consult ordered  · Discussed patient via tiger text with Dr Martha De Paz - patient is stable to be on the [facility] as long as BP and HR are ok

## 2021-09-16 NOTE — DISCHARGE INSTR - OTHER ORDERS
You are being discharged to [facility], [address]    Triggers you have identified during your hospitalization that led to your admission back pain, distressed mood  Coping skills you have identified during your hospitalization include collecting movies and reading the bible  If you are unable to deal with your distressed mood alone please contact Dr Ludwig Blake at [facility]  If that is not effective and you continue to have suicidal ideation, distressed mood, feel overwhelmed or in crisis), please contact [facility] ([facility]) AT [place] # [phone], dial 911 or go to the nearest emergency center  [facility] is a confidential 7 days/week telephone support service manned by trained mental health consumers  Warmline operates daily but is not able to accept calls between 2AM-6AM  Warmline provides support, a listening ear and can provide information about available services  Warmline specializes in the concerns of mental health consumers, their families and friends  However, we are also here for anyone who has a mental health concern, is confused about or just doesn't know anything about mental health or where to get information  To reach Girard, call [phone] accepts calls between 6:00 AM to 10:00 AM and from 4:00 PM to 12:00 AM     Text CONNECT to 446296 from anywhere in the [place], anytime, about any type of crisis  A live, trained Crisis Counselor receives the text and lets you know that they are here to listen  The volunteer Crisis Counselor will help you move from a hot moment to a cool moment  [facility] ([facility]) AT [place] Intervention   licensed telephone and mobile crisis services that provide mental health assessments to all age groups regardless of income or insurance  Crisis Intervention operates 24-hour/7 days a week  [address] [place] [address] assists consumer who are experiencing a mental health emergency and lack the resources to assist themselves  Immediate intervention for suicidal and depressed individuals with home visits/outreach being top priority  Crisis can be contacted at [phone]  The [facility] ([facility]) offers various education & support groups for you & your family  For more information visit their website at [email or web address]     Dial 2-1-1 to get connected/get help  Free, confidential information & referral available 24/7: Aging Services, Child & Youth Services, Counseling, Education/Training, Food/Shelter/Clothing, Health Services, Parenting, Substance Abuse, Support Groups, Volunteer Opportunities, & much more  Phone: 2-1-1 or [phone], Web: LQI [email or web address] [email or web address], Email: Kalen@Exakis    The [facility] ([address]) offers persons with a mental illness a safe, healing environment to explore their personal and vocational potential and receive support in achieving their goals  Instead of traditional therapy, the work of the house is the rehabilitation  As members contribute meaningful work to the house, they build confidence and a sense of purpose  Be a Member - It's Free Membership in the [facility] is open to any [facility] resident who is 25 or older and has a history of mental illness  Membership is free for life  [address] Hours: Monday - Friday: 8 a m  - 4 p m  With varying hours on holidays [phone]    The [address] ([address]) provides a stress-free atmosphere for persons 18 and older who have experienced mental health issues  What The [address],  Activities, Games, [employer], [employer] gatherings, Recovery, Employment, Education, [employer], Empowerment, Helps participants achieve their goals, Enhances quality of life, Encourages leadership   Rusty Escobar[address] Hours: Monday through Friday: 4 p m  - 9 p m  Saturday: 2 p m  - 8 p m  Closed Sundays Varying hours on holidays  Contact Dajuan [address] [place] [address], [facility], [address] [place] [address] [phone] hours Ramagregory Rossi, Thursday from 1pm-5pm and Friday, Saturday from 11am-3pm  no matter who you are, you are Welcome here  The [facility] is the only day center in [facility] that is open to the public  While everyone is welcome, we primarily serve adults who are experiencing homelessness, who are living with mental illness, who have experienced significant trauma, or have other conditions or experiences that can leave them isolated and alone  At the [facility], we want to make sure no one is ever left to face life alone  On any given day, you may find us sharing a meal, weaving a rug, knotting a quilt, getting our creative juices flowing with an art project, or working on a [employer]  [place]  currently has a total of 13 apartments (ranging in size from 1 to 3 bedrooms) at [facility]  These apartments are available for any and all individuals/families exiting homelessness or who are experiencing housing instability  Criteria: There are no restrictions or specific requirements in order to qualify for the [place]  The main criteria for qualification is being able to afford monthly rent and the primary lease rainey must be older than 18  Apply: Any individual interested in applying to [employer] should come to our office ([address] [place] [address]) to fill out an application with our   Please bring proof of income and/or employment  Please plan for 30 mins to complete the application  The housing application identifies applicants basic info, size of household, housing needs, income/employment, and housing history   Along with that, an assets/barriers dashboard will be completed and our programs Community Agreement will be explained  All applications are reviewed by our Selection Committee which is comprised of community partners and [address] [place] [address] staff  When an apartment is available, the Selection Committee reviews all qualified applicants to determine which individual/family is a good placement  Factors that go into this decision are applicants previous housing history, current housing needs and situation, and ability to adhere to the Community Agreement  The [address] safe, supportive programs for more than 300 people struggling with mental health; serves 25,000 hot meals, provides 500 free health screenings, and supplies transportation assistance to 150 people  Additional services available include: medical services through [employer], showers, lockers, phone calls, computer access with internet, art activities, personal development sessions, three meals through [employer], and a safe space  Outings are also offered, such as bowling, movies, and picnics  [address] [place] [address] [phone]    The National Suicide Prevention Lifeline, 6-977-877-BRRD (0744), is a federally funded, 24-hour, toll-free suicide prevention service comprised of more than 120 individual crisis centers across the country  This service is available to anyone in suicidal crisis, emotional distress or those concerned about a friend or loved one  Https://suicidepreventionlifeline org/    Housing Assistance- Pathways housing program is open for walk-ins  You can walk-in Monday thru Thursday from 9am to 3pm at [address] [place] [address]  Mitchell Thakur is the new [facility] Engagement Navigator at [phone] ext [phone] this is an appropriate program for those who have an income but not enough to have a security deposit and first months rent/get going for an apartment  Find more resources at Wilocity    Transportation Assistance Multi-State Transit Technical Assistance Program (MTAP) help with transportation assistance to appointments   [facility]  Data: To schedule a ride, please call [phone]  If you have questions, please call [phone] -We provide non-emergency service throughout the [address], as well as [place], Ricky, [facility], [place], [address] [facility] and [facility]  To arrange for your non-emergency medical transportation needs, please call us at [phone]  Drug and Alcohol Resources in [facility]    If you have health insurance, including medical assistance, there should be a phone number on your insurance card that you can call to find out how to access services  The card may say, For [address] or For Drug and Alcohol Services or For Substance Abuse Services call the number provided  Or contact 3-199-409-HELP    The [facility] for Opioid Use Disorder (STEPHANIE)  The [facility] provides care management for adults with Opioid Use Disorder  The [facility] has a team of care managers who will help individuals get into treatment, coordinate behavioral and physical health care, and provide recovery support and guidance  Care managers will also provide information about Medication-Assisted Treatment  Contact [phone]    [facility] Drug and Alcohol Administration [phone] For additional information, contact the Department of Human Services Information and Referral Unit at [phone] between the hours of 8:30 a m  and 4:30 p m , Monday through Friday  Recovery Centers  These centers offer a safe, sober environment to those in recovery  A variety of programming including 12-Step Meetings, Darline Sit, Life Skills Workshops, etc  is offered at each location  [facility], [address] [place] [address] [phone]   [address] [phone] www  otoniel Canseco free help, from public health agencies, to find substance use treatment and information  [phone]    Link for Zoom Codes for Virtual 12 step Meetings Kanwal funes  aspx    AA [facility]  If you feel you have a problem with alcohol, or if you simply want to know more about AA, call our 24-hour hotline at: [phone]     Amaray[address] Meetings can be found at http://www greene-wood biz/  AMANDA Meeting list https://www arreagaNovatek/  pdf    Local Outpatient Providers:   [facility] - provides intensive outpatient and outpatient treatment services to adolescents, adults and families experiencing drug and/or alcohol problems  Treatment modalities include individual, group and family counseling  Weekend DUI classes are available  [facility] is a division of [facility], [address]   Phone: [phone]   Fax: [phone]   9:00 a m  to 10:00 p m  (Mon-Thurs)   9:00 a m  to 5:00 p m  (Fri)   [facility] ([address])- specializes in opiate addiction treatment (methadone) and outpatient treatment services  [facility], [address] [place] [address], [address] [place] [address]  Phone: [phone]  Fax: [phone]   6:00 a m  to 2:00 p m  (Mon - Fri)   6:00 a m  to 9:00 a m  (Sat)   [facility] ([address]) - provides intensive outpatient treatment and outpatient treatment to adults and adolescents for drug and/or alcohol abuse and gambling addiction  [address]  Phone: [phone]  Fax: [phone]   10:00 a m  to 8:00 p m  (Mon - Fri)   [facility] ([facility]) - provides a full continuum of community-based outpatient services to treat behavioral problems associated with alcohol and drug addiction   Offering a variety of specialized client services, [facility] provides a high quality substance abuse treatment and co-occurring services to meet the specific needs of the individual  It is the outpatient affiliate of [facility] [address]AN [facility]T    [address], Mandi Fuller [address], [address]  Phone: [phone]   Fax: [phone]   9:00 a m  to 9:00 p m  (Mon - Thurs)   9:00 a m  to 5:00 p m  (Fri)   [address] - provides intensive outpatient and outpatient treatment services to adolescents, adults and families  [facility]  [address] [facility] [address], [place] [address]   [place], [address]   Phone: [phone]  Fax: [phone]   9:00 a m  to 5:00 p m  (Mondays)   9:00 a m  to 2:00 p m  (Tues-Fri)   [facility]  - provides treatment to adults with substance abuse and mental illness  The program provides individual, group and family therapy and psychiatric services  18 [facility], [address] [place] [address] [address], [facility] Aaron Berry 1460   Phone: [phone]   Fax: [phone]   8:00 a m  to 4:30 p m  (Mon, Tues, Thurs)   11:00 a m  to 7:00 p m  (Wed and Fri)   [facility] provides intensive outpatient treatment and outpatient treatment to adults and adolescents for drug and/or alcohol abuse     [address],[facility], [address], [facility]  Phone: [phone]  7:00 a m  to 8:00 p m  Pauline Dent - Fri), Saturday by appointment only

## 2021-09-16 NOTE — ASSESSMENT & PLAN NOTE
· Continue pre hospital regimen - tramadol 50mg Q4hrs (PDMP reviewed), Gabapentin 800mg TID  · Follow up with PCP for ongoing management

## 2021-09-16 NOTE — PLAN OF CARE
Pt did not attend groups when prompted or offered       Problem: Ineffective Coping  Goal: Participates in unit activities  Description: Interventions:  - Provide therapeutic environment   - Provide required programming   - Redirect inappropriate behaviors   Outcome: Not Progressing

## 2021-09-16 NOTE — NURSING NOTE
C/o lower back pain tramadol 50 mg was given at 1904 and it was mildly effective as his pain went down to 5/10 on reassessment

## 2021-09-16 NOTE — DISCHARGE INSTR - APPOINTMENTS
Hieu Jones RN, our [employer], will be calling you after your discharge, on the phone number that you provided  She will be available as an additional support, if needed  If you wish to speak with her, you may contact Golden Perez at [phone]

## 2021-09-16 NOTE — NURSING NOTE
Pt constricted but pleasant and med-compliant with good appetite and steady gait  Using ultram and tylenol po prn with some positive effect  VSS  Denied SI  Monitored for safety and support

## 2021-09-16 NOTE — TREATMENT TEAM
09/16/21 1558   Team Meeting   Meeting Type Tx Team Meeting   Initial Conference Date 09/16/21   Team Members Present   Team Members Present Physician;Nurse;   Physician Team Member Dr Marium Keller Team Member Alida Mejias RN   Care Management Team Member Jon Rodriguez   Patient/Family Present   Patient Present Yes   Patient's Family Present No     Treatment plan and goals reviewed with pt  Pt in agreement with all goals and interventions  Treatment plan signed and copy placed in d/c folder

## 2021-09-16 NOTE — ASSESSMENT & PLAN NOTE
Patient is medically cleared for admission to the [facility] for treatment of the underlying psychiatric illness

## 2021-09-16 NOTE — CASE MANAGEMENT
Pt is a 74year old  male admitted 9/15/21 1553 as a 201 from B ED  CW met with pt in order to complete initial intake/assessment and pt presents as cooperative  Pt reports 5 past IP psych admissions last 13-14 years ago at [facility] ([facility])  Pt reports he resides at [address] and plans to return there upon disch  Pt reports he will need transport home  Pt reports cell number as [phone]  Pt signed FLORINA for OP [facility] provider [facility] [phone]  Pt stated he sees Dr Jose Castañeda he reports he did see a therapist in the past but that they determined it was no longer needed and was just placed on medication management  CW contacted and notified of admission, pt had appointment on 9/20 but will most likely still be in the hospital, appointment canceled, Shelda Lat will call to reschedule when we have disch date  Pt denies having an ICM  Pt signed FLORINA for PCP [facility] [phone]  CW contacted office and notified of admission  Pt reports primary support as schuyler Heller Human [phone], declined to sign FLORINA  [facility], CRISIS, and D&A resources placed in AVS     Pt AUDIT 18, UDS-, PAWSS 3  Pt reports drinking alcohol 2 times monthly he reports he will either drink a 1/2 gallon of wine or a 1/5 of whisky in one sitting and that he has been drinking like this for the past 6-7 years, last use 9/14, age of first use 14  Pt reports 2 past IP rehabs last in the 80's one past OP a long time ago  Pt reports spoartic experience with AA/NA and states experience as "I don't like it"  Pt reports he may be open to a OP D&A referral     Pt signed admission IMM  Pt denies having a POA  Transportation Public transportation   [address] home? Y/N with who No, will need transport    Access to firearms Pt denies    Supports Fiance    Legal Pt denies    Education  HS   Employed?  Y/N Where Retired   Income Source/How much [address] [facility] [address], [address] [place] [address] Ave monthly      Pt reports stressors/barriers/triggers as "back pain"  Pt reports coping skills as "collect movies and read the bible"  Pt reports chief compliant as "I was being depressed and I started thinking the wrong way and I know when I start thinking that way it is a dangerous situation so I came in for help"

## 2021-09-16 NOTE — CONSULTS
Haile Mehta 1955, 74 y o  male MRN: [id number]  Unit/Bed#: [facility] 556-10 Encounter: [id number]  Primary Care Provider: Curry Landau, MD   Date and time admitted to hospital: 9/15/2021  3:53 PM    Inpatient consult for Medical Clearance for [facility] patient  Consult performed by: Vern Scheuermann, PA-C  Consult ordered by: Abimael Gan MD        Medical clearance for psychiatric admission  Assessment & Plan  Patient is medically cleared for admission to the [facility] for treatment of the underlying psychiatric illness    Atrial fibrillation [facility])  Assessment & Plan  · New onset of atrial fibrillation as evidenced by EKG in [facility] yesterday  · Heart rate has been stable since admission to the [facility]  · Repeat EKG and Cardiology consult ordered  · Discussed patient via tiger text with Dr Darlnie Lim - patient is stable to be on the [facility] as long as BP and HR are ok      Chronic low back pain  Assessment & Plan  · Continue pre hospital regimen - tramadol 50mg Q4hrs (PDMP reviewed), Gabapentin 800mg TID  · Follow up with PCP for ongoing management    Diabetes mellitus [facility])  Assessment & Plan  Lab Results   Component Value Date    HGBA1C 7 1 (H) 02/25/2021       No results for input(s): POCGLU in the last 72 hours      Blood Sugar Average: Last 72 hrs:  · Last A1C at 7 1 on 02/25/21  · Patient uses victoza at home  · Victoza not on hospital formulary - will order SSI, restart victoza upon discharge  · Hypoglycemia protocol  · Carb controled diet      Hypertension  Assessment & Plan  · BP stable since admission to the [facility]  · Continue pre hospital Lisinopril 20mg and amlodipine 10mg QD  · Continue to monitor    * Bipolar 1 disorder, depressed, severe (HCC)  Assessment & Plan  · Management per psychiatry    ECT Clearance:   History of recent seizure or stroke:  NO   History of pheochromocytoma:  No   History of active bleeding (Intracranial hemorrhage, aneurysm or AVM):  No   History of metallic implants in the head or neck:  No   History of increased intracranial pressure with mass effect:  No   Does the patient have a current arrhythmia? yes    Based on above criteria, Patient is medically cleared for ECT should it be recommended  Counseling / Coordination of Care Time: 30 minutes  Greater than 50% of total time spent on patient counseling and coordination of care  Collaboration of Care: Were Recommendations Directly Discussed with Primary Treatment Team? - No     History of Present Illness:    Milena Caputo is a 74 y o  male with a past medical history significant for chronic low back pain, hypertension, and diabetes mellitus who is originally admitted to the psychiatry service due to thoughts of harming himself  We are consulted for medical clearance for admission to [facility] and treatment of underlying psychiatric illness  Patient complaining of low back pain which is chronic and headache  Denies any dizziness, chest pain, sob, abdominal pain, nausea/diarrhea/constipation  Review of Systems:    Review of Systems   Constitutional: Negative for fatigue  HENT: Negative for congestion and rhinorrhea  Respiratory: Negative for cough and shortness of breath  Cardiovascular: Negative for chest pain  Gastrointestinal: Negative for abdominal pain, constipation, diarrhea and nausea  Genitourinary: Negative for difficulty urinating  Musculoskeletal: Positive for back pain  Neurological: Positive for headaches  Negative for dizziness and light-headedness  Past Medical and Surgical History:     Past Medical History:   Diagnosis Date    Anxiety     Depression     Diabetes mellitus ([facility] [address] )     Hypertension     PTSD (post-traumatic stress disorder)        No past surgical history on file      Meds/Allergies:    all medications and allergies reviewed    Allergies: No Known Allergies    Social History:     Marital Status:     Substance Use History:   Social History     Substance and Sexual Activity   Alcohol Use Yes     Social History     Tobacco Use   Smoking Status Former Smoker   Smokeless Tobacco Never Used     Social History     Substance and Sexual Activity   Drug Use No       Family History:    non-contributory    Physical Exam:     Vitals:   Blood Pressure: 130/87 (09/16/21 0921)  Pulse: 87 (09/16/21 0640)  Temperature: 97 5 °F (36 4 °C) (09/16/21 0640)  Temp Source: Temporal (09/16/21 0640)  Respirations: 18 (09/16/21 0640)  Height: 6' (182 9 cm) (09/15/21 1626)  Weight - Scale: 114 kg (251 lb 1 7 oz) (09/15/21 1626)  SpO2: 94 % (09/16/21 0640)    Physical Exam  Constitutional:       General: He is not in acute distress  Appearance: Normal appearance  He is not ill-appearing, toxic-appearing or diaphoretic  HENT:      Head: Normocephalic and atraumatic  Mouth/Throat:      Mouth: Mucous membranes are moist    Eyes:      General: No scleral icterus  Cardiovascular:      Rate and Rhythm: Normal rate  Rhythm irregular  Pulses: Normal pulses  Heart sounds: Normal heart sounds  No murmur heard  No friction rub  No gallop  Pulmonary:      Effort: Pulmonary effort is normal  No respiratory distress  Breath sounds: Normal breath sounds  No wheezing or rhonchi  Abdominal:      General: Abdomen is flat  Bowel sounds are normal  There is no distension  Palpations: Abdomen is soft  Tenderness: There is no abdominal tenderness  Musculoskeletal:      Cervical back: Normal range of motion  Right lower leg: No edema  Left lower leg: No edema  Skin:     General: Skin is warm and dry  Coloration: Skin is not jaundiced  Neurological:      General: No focal deficit present  Mental Status: He is alert  Additional Data:     Lab Results: I have personally reviewed pertinent reports        Results from last 7 days   Lab Units 09/15/21  0957   WBC Thousand/uL 7  14   HEMOGLOBIN g/dL 14 8   HEMATOCRIT % 43 5   PLATELETS Thousands/uL 207   NEUTROS PCT % 65   LYMPHS PCT % 23   MONOS PCT % 10   EOS PCT % 2     Results from last 7 days   Lab Units 09/15/21  0957   SODIUM mmol/L 139   POTASSIUM mmol/L 3 7   CHLORIDE mmol/L 109*   CO2 mmol/L 25   BUN mg/dL 9   CREATININE mg/dL 0 72   ANION GAP mmol/L 5   CALCIUM mg/dL 8 5   ALBUMIN g/dL 3 9   TOTAL BILIRUBIN mg/dL 0 68   ALK PHOS U/L 58   ALT U/L 30   AST U/L 19   GLUCOSE RANDOM mg/dL 152*             Lab Results   Component Value Date/Time    HGBA1C 7 1 (H) 02/25/2021 07:22 AM    HGBA1C 6 4 (H) 08/31/2020 02:06 PM    HGBA1C 7 8 (H) 03/02/2020 08:00 AM     Results from last 7 days   Lab Units 09/16/21  1334   POC GLUCOSE mg/dl 231*       EKG, Pathology, and Other Studies Reviewed on Admission:   EKG: Age and gender specific ECG analysis   Atrial fibrillation with premature ventricular or aberrantly conducted complexes  Left axis deviation  Right bundle branch block  Abnormal ECG  When compared with ECG of 27-OCT-2015 07:10,  Atrial fibrillation has replaced Sinus rhythm  Right bundle branch block is now Present  · Confirmed by Chriss Collazo ([id number]) on 9/15/2021 3:35:42 PM    ** Please Note: This note has been constructed using a voice recognition system   **

## 2021-09-16 NOTE — ASSESSMENT & PLAN NOTE
Lab Results   Component Value Date    HGBA1C 7 1 (H) 02/25/2021       No results for input(s): POCGLU in the last 72 hours      Blood Sugar Average: Last 72 hrs:  · Last A1C at 7 1 on 02/25/21  · Patient uses victoza at home  · Victoza not on hospital formulary - will order SSI, restart victoza upon discharge  · Hypoglycemia protocol  · Carb controled diet

## 2021-09-16 NOTE — NURSING NOTE
Patient is a 89 admit from [facility] ED who went there due to increased depressed/anxiety and SI with no specific plan  Per crisis note patient stopped taking his psych medications about a month ago after many years of compliance, that he stopped taking his medication because he got tired of taking many pills each day  Hx of bipolar disorder, PTSD, depression, anxiety, HTN and DM  Patient rate anxiety 3/5, depression, 5/10, lower back pain 7/10, denies SI/HI, A/V/H  A former smoker and use alcohol presently  Skin intact  Patient was cooperative with the admission process and was oriented to the unit  Q 7 minutes safety checks initiated

## 2021-09-16 NOTE — TREATMENT TEAM
09/16/21 1220   Team Meeting   Meeting Type Daily Rounds   Initial Conference Date 09/16/21   Team Members Present   Team Members Present ;Physician;Nurse;   Physician Team Member Dr Frankey Flurry Team Member Jael Mccallum RN   Care Management Team Member Florentin Vincent Work Team Member Sonia Andrew   Patient/Family Present   Patient Present No   Patient's Family Present No     Pt is a 10 admission from [facility] ED, noncompliant with meds x 1 month, increased depression and anxiety, + SI without plan, reports anxiety 3/4, depression 5/10, currently denies SI and HI and AV, med compliant, PRN Tramadol due to c/o back pain

## 2021-09-16 NOTE — ASSESSMENT & PLAN NOTE
· BP stable since admission to the [facility]  · Continue pre hospital Lisinopril 20mg and amlodipine 10mg QD  · Continue to monitor

## 2021-09-16 NOTE — CASE MANAGEMENT
CM met with pt; provided introduction and reviewed role of CM and d/c planning process  Pt reports he does want to cont with outpatient treatment at [facility]  Pt cont to report not wanting to sign any FLORINA for family or support persons  CM encouraged pt to involve identified support persons in his care plan and d/c plan   Pt reports he will "think about it "

## 2021-09-16 NOTE — PLAN OF CARE
Problem: Risk for Self Injury/Neglect  Goal: Treatment Goal: Remain safe during length of stay, learn and adopt new coping skills, and be free of self-injurious ideation, impulses and acts at the time of discharge  Outcome: Progressing  Goal: Verbalize thoughts and feelings  Description: Interventions:  - Assess and re-assess patient's lethality and potential for self-injury  - Engage patient in 1:1 interactions, daily, for a minimum of 15 minutes  - Encourage patient to express feelings, fears, frustrations, hopes  - Establish rapport/trust with patient   Outcome: Progressing  Goal: Refrain from harming self  Description: Interventions:  - Monitor patient closely, per order  - Develop a trusting relationship  - Supervise medication ingestion, monitor effects and side effects   Outcome: Progressing  Goal: Attend and participate in unit activities, including therapeutic, recreational, and educational groups  Description: Interventions:  - Provide therapeutic and educational activities daily, encourage attendance and participation, and document same in the medical record  - Obtain collateral information, encourage visitation and family involvement in care   Outcome: Progressing  Goal: Recognize maladaptive responses and adopt new coping mechanisms  Outcome: Progressing  Goal: Complete daily ADLs, including personal hygiene independently, as able  Description: Interventions:  - Observe, teach, and assist patient with ADLS  - Monitor and promote a balance of rest/activity, with adequate nutrition and elimination  Outcome: Progressing     Problem: Depression  Goal: Treatment Goal: Demonstrate behavioral control of depressive symptoms, verbalize feelings of improved mood/affect, and adopt new coping skills prior to discharge  Outcome: Progressing  Goal: Refrain from isolation  Description: Interventions:  - Develop a trusting relationship   - Encourage socialization   Outcome: Progressing  Goal: Refrain from self-neglect  Outcome: Progressing  Goal: Complete daily ADLs, including personal hygiene independently, as able  Description: Interventions:  - Observe, teach, and assist patient with ADLS  -  Monitor and promote a balance of rest/activity, with adequate nutrition and elimination   Outcome: Progressing     Problem: Anxiety  Goal: Anxiety is at manageable level  Description: Interventions:  - Assess and monitor patient's anxiety level  - Monitor for signs and symptoms (heart palpitations, chest pain, shortness of breath, headaches, nausea, feeling jumpy, restlessness, irritable, apprehensive)  - Collaborate with interdisciplinary team and initiate plan and interventions as ordered    - [place] patient to unit/surroundings  - Explain treatment plan  - Encourage participation in care  - Encourage verbalization of concerns/fears  - Identify coping mechanisms  - Assist in developing anxiety-reducing skills  - Administer/offer alternative therapies  - Limit or eliminate stimulants  Outcome: Progressing

## 2021-09-16 NOTE — TREATMENT PLAN
TREATMENT PLAN REVIEW - [id number] [facility] Quinn 74 y o  1955 male MRN: [id number]    [address] Room / Bed: Ferrel Soulier 608/OABHU 154-11 Encounter: [id number]          Admit Date/Time:  9/15/2021  3:53 PM    Treatment Team: Attending Provider: Jose Carlos Oneill MD; Consulting Physician: Collette Smalls, PA-C; Care Manager: Temitope Jerry RN; Nurse Manager: Andreas Smart RN; : jN Beaulieu; Patient Care Assistant: Star Ambrocio; Consulting Physician: Brandon Stockton MD    Diagnosis: Principal Problem:    Bipolar 1 disorder, depressed, severe ([facility] )  Active Problems:    Hypertension    Diabetes mellitus ([facility] )    Chronic low back pain    Medical clearance for psychiatric admission    Atrial fibrillation [facility])      Patient Strengths/Assets: adapts well, cooperative, communication skills, good past treatment response    Patient Barriers/Limitations: financial instability, limited support system, noncompliant with medication    Short Term Goals: decrease in depressive symptoms, decrease in anxiety symptoms    Long Term Goals: resolution of depressive symptoms, stabilization of mood, free of suicidal thoughts, free of homicidal thoughts    Progress Towards Goals: starting psychiatric medications as prescribed    Recommended Treatment: medication management, patient medication education, group therapy, milieu therapy, continued Behavioral Health psychiatric evaluation/assessment process    Treatment Frequency: daily medication monitoring, group and milieu therapy daily, monitoring through interdisciplinary rounds, monitoring through weekly patient care conferences    Expected Discharge Date:  9/23/21    Discharge Plan: discharge to home    Treatment Plan Created/Updated By: Jose Carlos Oneill MD

## 2021-09-16 NOTE — H&P
Psychiatric Evaluation - Behavioral Health     Identification Data:Christian Sanford 74 y o  male MRN: [id number]  Unit/Bed#:  Encounter: [id number]    Chief Complaint: " I feel depressed and hopeless"    History of present illness:    Patient is a 74year old male, he ihas been  x 3, lives alone in apartment,  has a fiance who lives in [place], he has  5 adult children  He has  a history of bipolar disorder, keily reported in the 1970s, has had at least 5 prior psych admissions, last admission was about 9 years ago, he binge drinks alcohol  He follows with Dr Patrizia Almazan at [facility] x 8 years  PMH include HTN, HLD, chronic back pain, DM  Patient presented to the   B ED due to increased anxiety and SI   He denied specific plan in the context of medication non- compliandce x 1month  Pt was admitted to the psych unit for stabilization  Patient is a fair historian  On 6T,  He reports  he has been felling down and depressed for the past month and a half  He stopped taking his Latuda  and wanted to find out if he could be ok without it as he believes he has been taking too much medication  He reports current stressors are  back pain which has been causing disruption in his sleep  He has plans( like visiting his fiance in [place]) but he is unable to accomplish them due to his financial limitations  He denies any symptoms of keily but reports a Hx of manic symptoms in the 76s  He endorses depressed mood, anhedonia, hopelessness, poor sleep with difficulties staying asleep  He denies JIAN today  He denies feeling of guilt and denies AVH  No delusions elicited  He reports he drinks alcohol about  2wice a week, but binge drinks and ingest about a 1/5 of whiskey or half a gallon of wine at a time  He denies any cigarette use or Illicit drug use  Called pt's family [facility] [address] , the reported pt picked his medication Latuda 60mg only in January and February 2021        Psychiatric Review Of Systems:  Change in sleep: yes, jackie morning , awakening, difficulties staying asleep  Appetite changes: yes, increased, eats more junk food  Weight changes: no  Change in energy/anergy: yes, low  Change in interest/pleasure/anhedonia: yes  Somatic symptoms: yes, back ain, that is pulling, nagging, burning, about 7/10 at time of this interview  Anxiety/panic: no  Manic symptoms: no, in the past in the 70s  Guilt feelings:no  Hopeless: yes  Self injurious behavior/risky behavior: no    Historical Information    PDMP  Total Prescriptions: 33    Total Private Pay: 2    Fill Date ID   Written Drug Qty Days Prescriber Rx # Pharmacy Refill   Daily Dose* Pymt Type      08/26/2021  1   07/02/2021  Tramadol Hcl 50 MG Tablet  180 00  30 Ma Sto   [id number]   Pen (1007[place]   2  30 00 [place]   07/30/2021  1   07/02/2021  Tramadol Hcl 50 MG Tablet  180 00  30 Ma Sto   [id number]   Pen (1007)   1  30 00 [place]   07/03/2021  1   07/02/2021  Tramadol Hcl 50 MG Tablet  180 00  30 Ma Sto   [id number]   Pen (1007)   0  30 00 [place]   06/06/2021  1   04/12/2021  Tramadol Hcl 50 MG Tablet  180 00  30 Ma Sto   [id number]   Pen (1007)   2  30 00 [place]   05/10/2021  1   04/12/2021  Tramadol Hcl 50 MG Tablet  180 00  30 Ma Sto   [id number]   Pen (1007)   1  30 00 [place]   04/13/2021  2   04/12/2021  Tramadol Hcl 50 MG Tablet  180 00  30 Ma Sto   [id number]   Pen (1007)   0  30 00 [place]       PMH : Head trauma as a child he reports he fell, no History of seizures  Past Psychiatric History:   Yes, pt reports he first saw a psych at age 21, he reports keily in the 76s  He was diagnosed with Bipolar disorder  Admitted about 5 times  Not admitted for the past 9 years  He reports 1 suicide attempt by Overdose in the 76s, Unsure of what he took but stets it was a " downer", " they had to pump my stomach" admitted at [facility]       Patient reports trials of Seroquel - did not help  Trazodone - multiple dreams    Substance Abuse History:  As above,     Family Psychiatric History:   Unknown     Social History:  Developmental:denies learning  disability  Education: to be explored  Marital history:   Living arrangement, social support: lives alone  Occupational History: unknown occupation, retired  Access to firearms: denies   was in [facility] but did not have an honorable discharge  Traumatic History:   Abuse:none is reported  Other Traumatic Events: None reported    Past Medical History:   Diagnosis Date    Anxiety     Depression     Diabetes mellitus ([facility] [address] )     Hypertension     PTSD (post-traumatic stress disorder)        Medical Review Of Systems:  Pertinent items are noted in HPI  Meds/Allergies   all current active meds have been reviewed  No Known Allergies  Objective      Mental Status Evaluation:  Appearance:  {Marginal/poor hygiene   Behavior:  calm and cooperative   Speech:   Language Soft  No overt abnormality   Mood:  Depressed and Anxious   Affect: Thought process constricted and inappropriate  Goal directed and coherent   Associations: Tightly connected   Thought Content:  Does not verbalize delusional material   Perceptual Disturbances: Denies hallucinations and does not appear to be responding to internal stimuli   Risk Potential: No suicidal or homicidal ideation   Orientation  Oriented x 3   Memory grossly intact   Attention/Concentration attention span and concentration were age appropriate   Fund of knowledge aware of current events   Insight:  limited   Judgment: Limited   Gait/Station: normal gait/station   Motor Activity: No abnormal movement noted         Lab Results: I have personally reviewed pertinent lab results       Recent Results (from the past 336 hour(s))   Rapid drug screen, urine    Collection Time: 09/15/21  9:35 AM   Result Value Ref Range    Amph/Meth UR Negative Negative    Barbiturate Ur Negative Negative Benzodiazepine Urine Negative Negative    Cocaine Urine Negative Negative    Methadone Urine Negative Negative    Opiate Urine Negative Negative    PCP Ur Negative Negative    THC Urine Negative Negative    Oxycodone Urine Negative Negative   UA w Reflex to Microscopic w Reflex to Culture    Collection Time: 09/15/21  9:36 AM    Specimen: Urine, Clean Catch   Result Value Ref Range    Color, UA Yellow     Clarity, UA Clear     Specific [place], UA 1 018 1 003 - 1 030    pH, UA 6 0 4 5, 5 0, 5 5, 6 0, 6 5, 7 0, 7 5, 8 0    Leukocytes, UA Negative Negative    Nitrite, UA Negative Negative    Protein,  (2+) (A) Negative mg/dl    Glucose, UA Negative Negative mg/dl    Ketones, UA Negative Negative mg/dl    Urobilinogen, UA 0 2 0 2, 1 0 E U /dl E U /dl    Bilirubin, UA Negative Negative    Blood, UA Negative Negative   Urine Microscopic    Collection Time: 09/15/21  9:36 AM   Result Value Ref Range    RBC, UA None Seen None Seen, 2-4 /hpf    WBC, UA 2-4 None Seen, 2-4, 5-60 /hpf    Epithelial Cells None Seen None Seen, Occasional /hpf    Bacteria, UA None Seen None Seen, Occasional /hpf    Hyaline Casts, UA 10-25 (A) None Seen /lpf   No[facility] Coron[facility]Covid-19),[facility]    Collection Time: 09/15/21  9:37 AM    Specimen: Nasopharyngeal Swab; Nares   Result Value Ref Range    SARS-CoV-2 Negative Negative   CBC and differential    Collection Time: 09/15/21  9:57 AM   Result Value Ref Range    WBC 7 14 4 31 - 10 16 Thousand/uL    RBC 5 10 3 88 - 5 62 Million/uL    Hemoglobin 14 8 12 0 - 17 0 g/dL    Hematocrit 43 5 36 5 - 49 3 %    MCV 85 82 - 98 fL    MCH 29 0 26 8 - 34 3 pg    MCHC 34 0 31 4 - 37 4 g/dL    RDW 12 9 11 6 - 15 1 %    MPV 9 5 8 9 - 12 7 fL    Platelets 613 784 - 586 Thousands/uL    nRBC 0 /100 WBCs    Neutrophils Relative 65 43 - 75 %    Immat GRANS % 0 0 - 2 %    Lymphocytes Relative 23 14 - 44 %    Monocytes Relative 10 4 - 12 %    Eosinophils Relative 2 0 - 6 %    Basophils Relative 0 0 - 1 % Neutrophils Absolute 4 57 1 85 - 7 62 Thousands/µL    Immature Grans Absolute 0 02 0 00 - 0 20 Thousand/uL    Lymphocytes Absolute 1 67 0 60 - 4 47 Thousands/µL    Monocytes Absolute 0 73 0 17 - 1 22 Thousand/µL    Eosinophils Absolute 0 12 0 00 - 0 61 Thousand/µL    Basophils Absolute 0 03 0 00 - 0 10 Thousands/µL   Comprehensive metabolic panel    Collection Time: 09/15/21  9:57 AM   Result Value Ref Range    Sodium 139 136 - 145 mmol/L    Potassium 3 7 3 5 - 5 3 mmol/L    Chloride 109 (H) 100 - 108 mmol/L    CO2 25 21 - 32 mmol/L    ANION GAP 5 4 - 13 mmol/L    BUN 9 5 - 25 mg/dL    Creatinine 0 72 0 60 - 1 30 mg/dL    Glucose 152 (H) 65 - 140 mg/dL    Calcium 8 5 8 3 - 10 1 mg/dL    AST 19 5 - 45 U/L    ALT 30 12 - 78 U/L    Alkaline Phosphatase 58 46 - 116 U/L    Total Protein 7 2 6 4 - 8 2 g/dL    Albumin 3 9 3 5 - 5 0 g/dL    Total Bilirubin 0 68 0 20 - 1 00 mg/dL    eGFR 111 ml/min/1 73sq m   TSH, 3rd generation with Free T4 reflex    Collection Time: 09/15/21  9:57 AM   Result Value Ref Range    TSH 3RD GENERATON 0 311 (L) 0 358 - 3 740 uIU/mL   Ethanol    Collection Time: 09/15/21  9:57 AM   Result Value Ref Range    Ethanol Lvl <3 0 - 3 mg/dL   Salicylate level    Collection Time: 09/15/21  9:57 AM   Result Value Ref Range    Salicylate Lvl <3 (L) 3 - 20 mg/dL   Acetaminophen level-If concentration is detectable, please discuss with medical  on call  Collection Time: 09/15/21  9:57 AM   Result Value Ref Range    Acetaminophen Level <2 (L) 10 - 20 ug/mL   T4, free    Collection Time: 09/15/21  9:57 AM   Result Value Ref Range    Free T4 0 92 0 76 - 1 46 ng/dL   Hemoglobin A1C w/ EAG Estimation    Collection Time: 09/15/21  9:57 AM   Result Value Ref Range    Hemoglobin A1C 6 6 (H) Normal 3 8-5 6%; PreDiabetic 5 7-6 4%;  Diabetic >=6 5%; Glycemic control for adults with diabetes <7 0% %     mg/dl   POCT alcohol breath test    Collection Time: 09/15/21  9:59 AM   Result Value Ref Range EXTBreath Alcohol 0 000    ECG 12 lead    Collection Time: 09/15/21 12:02 PM   Result Value Ref Range    Ventricular Rate 89 BPM    Atrial Rate 208 BPM    MN Interval  ms    QRSD Interval 146 ms    QT Interval 394 ms    QTC Interval 479 ms    P Axis  degrees    QRS Axis -31 degrees    T Wave Axis 29 degrees   Fingerstick Glucose (POCT)    Collection Time: 09/16/21  1:34 PM   Result Value Ref Range    POC Glucose 231 (H) 65 - 140 mg/dl   Fingerstick Glucose (POCT)    Collection Time: 09/16/21  4:10 PM   Result Value Ref Range    POC Glucose 198 (H) 65 - 140 mg/dl     Imaging Studies:  No Head CT  EKG, Pathology, and Other Studies: AfibAtrial fibrillation with premature ventricular or aberrantly conducted complexes  Left axis deviation  Right bundle branch block  Abnormal ECG  When compared with ECG of 27-OCT-2015 07:10,  Atrial fibrillation has replaced Sinus rhythm  Right bundle branch block is now Present  Confirmed by Jean Garsia ([id number]) on 9/15/2021 3:35:42 PM    Code Status:Full code    Patient Strengths/Assets: cooperative, communication skills, good past treatment response    Patient Barriers/Limitations: noncompliant with medication    Assessment/Plan      Patient is a 53-year-old male, with a history of bipolar disorder, presenting with severe depressive symptoms  Alyssa Jazmyne since February  Patient in reporting suicidal ideations, current stressors of back pain and financial stressors  On mental status examination,  patient is depressed mood with a constricted affect, He denies suicidal homicidal ideation at this time  No psychotic symptoms  Patient agreed to restart Tiki Trinidad, as he has had good response in the past   Plan to slowly up titrated throughout this admission  He needs continued inpatient stay for stabilization medication adjustments and to ensure a safe disposition        Principal Problem:    Bipolar 1 disorder, depressed, severe ([facility] [address] )  Active Problems:    Hypertension    Chronic low back pain    Atrial fibrillation (HCC)    Plan:     - Medications;   Psychiatric: Start Latuda 20mg daily, plan to uptitrate to target dose as tolerated     Medical: restart pain medication; Repeat EKG    -Therapy: occupational therapy, milieu and group therapy  - Legal: 201   -Disposition:Home     Risks, benefits and possible side effects of Medications:   Risks, benefits, and possible side effects of medications explained to patient and patient verbalizes understanding

## 2021-09-17 LAB
GLUCOSE SERPL-MCNC: 163 MG/DL (ref 65–140)
GLUCOSE SERPL-MCNC: 165 MG/DL (ref 65–140)
GLUCOSE SERPL-MCNC: 198 MG/DL (ref 65–140)
GLUCOSE SERPL-MCNC: 220 MG/DL (ref 65–140)

## 2021-09-17 PROCEDURE — NC001 PR NO CHARGE: Performed by: INTERNAL MEDICINE

## 2021-09-17 PROCEDURE — 99232 SBSQ HOSP IP/OBS MODERATE 35: CPT | Performed by: INTERNAL MEDICINE

## 2021-09-17 PROCEDURE — 82948 REAGENT STRIP/BLOOD GLUCOSE: CPT

## 2021-09-17 PROCEDURE — 99232 SBSQ HOSP IP/OBS MODERATE 35: CPT | Performed by: STUDENT IN AN ORGANIZED HEALTH CARE EDUCATION/TRAINING PROGRAM

## 2021-09-17 RX ORDER — DILTIAZEM HYDROCHLORIDE 120 MG/1
120 CAPSULE, COATED, EXTENDED RELEASE ORAL DAILY
Status: DISCONTINUED | OUTPATIENT
Start: 2021-09-17 | End: 2021-09-20

## 2021-09-17 RX ADMIN — AMLODIPINE BESYLATE 10 MG: 10 TABLET ORAL at 08:35

## 2021-09-17 RX ADMIN — ACETAMINOPHEN 650 MG: 325 TABLET ORAL at 10:28

## 2021-09-17 RX ADMIN — GABAPENTIN 300 MG: 300 CAPSULE ORAL at 16:03

## 2021-09-17 RX ADMIN — INSULIN LISPRO 4 UNITS: 100 INJECTION, SOLUTION INTRAVENOUS; SUBCUTANEOUS at 12:10

## 2021-09-17 RX ADMIN — APIXABAN 5 MG: 5 TABLET, FILM COATED ORAL at 17:25

## 2021-09-17 RX ADMIN — INSULIN LISPRO 4 UNITS: 100 INJECTION, SOLUTION INTRAVENOUS; SUBCUTANEOUS at 08:35

## 2021-09-17 RX ADMIN — ACETAMINOPHEN 650 MG: 325 TABLET ORAL at 21:41

## 2021-09-17 RX ADMIN — LURASIDONE HYDROCHLORIDE 20 MG: 20 TABLET, FILM COATED ORAL at 16:04

## 2021-09-17 RX ADMIN — INSULIN LISPRO 4 UNITS: 100 INJECTION, SOLUTION INTRAVENOUS; SUBCUTANEOUS at 17:26

## 2021-09-17 RX ADMIN — TRAMADOL HYDROCHLORIDE 50 MG: 50 TABLET, FILM COATED ORAL at 16:03

## 2021-09-17 RX ADMIN — INSULIN LISPRO 4 UNITS: 100 INJECTION, SOLUTION INTRAVENOUS; SUBCUTANEOUS at 21:42

## 2021-09-17 RX ADMIN — TRAZODONE HYDROCHLORIDE 50 MG: 50 TABLET ORAL at 21:41

## 2021-09-17 RX ADMIN — GABAPENTIN 300 MG: 300 CAPSULE ORAL at 21:40

## 2021-09-17 RX ADMIN — LISINOPRIL 20 MG: 20 TABLET ORAL at 08:35

## 2021-09-17 RX ADMIN — APIXABAN 5 MG: 5 TABLET, FILM COATED ORAL at 12:10

## 2021-09-17 RX ADMIN — GABAPENTIN 300 MG: 300 CAPSULE ORAL at 08:35

## 2021-09-17 NOTE — TREATMENT TEAM
09/17/21 0854   Team Meeting   Meeting Type Daily Rounds   Initial Conference Date 09/17/21   Team Members Present   Team Members Present ;Physician;Nurse;;Occupational Therapist   Physician Team Member Dr Lakesha Haley Team Member [id number] Management Team Member [id number]  Work Team Member Ilya   OT Team Member Flor LUTHER   Patient/Family Present   Patient Present No   Patient's Family Present No     Visible yet isolative to self, preccupied with pain medications, received tylenol, tramadol, and trazadone PRNs, denies s/s, meds to be adjusted

## 2021-09-17 NOTE — PLAN OF CARE
Pt fully engaged in two of three groups today with calm demeanor and ability to focus to topic/task    Problem: Ineffective Coping  Goal: Participates in unit activities  Description: Interventions:  - Provide therapeutic environment   - Provide required programming   - Redirect inappropriate behaviors   Outcome: Progressing

## 2021-09-17 NOTE — CASE MANAGEMENT
Call made to [facility] to schedule outpatient appt  CM informed that pt's med list must be faxed to #[phone] and more secure d/c date known   Pt follows with Dr Devin Marley and next available is beginning of Oct

## 2021-09-17 NOTE — PROGRESS NOTES
09/17/21 1000 09/17/21 1100 09/17/21 1330   Activity/Group Checklist   Group Community meeting Other (Comment)  ([address] recovery) Life Skills  (trivial persuit )   Attendance Did not attend  (joined as group was over ) Attended Attended   Attendance Duration (min)  --  31-45 46-60   Interactions  --  Interacted appropriately Interacted appropriately   Affect/Mood  --  Appropriate Appropriate;Calm;Normal range   Goals Achieved  --  Identified feelings; Identified relapse prevention strategies; Discussed coping strategies; Able to self-disclose; Able to recieve feedback Able to engage in interactions

## 2021-09-17 NOTE — NURSING NOTE
Pt appears calm, visible on unit  Social with peers  Pt perseverating on pain medications  Tramadol admistered for 7/10 generalized back pain, partially effective  Pt calm and watching tv in small dayroom  Medication compliant  Denies symptoms including SI Trazodone prn administered for sleep

## 2021-09-17 NOTE — NURSING NOTE
Pt constricted but pleasant on approach and med-compliant with Good appetite and steady gait  /60 (manually) at 1200, cardizem held  Pt using prn tylenol for back pain with some positive effect   at 1100  Denied SI  Attended group  Monitored for safety and support

## 2021-09-17 NOTE — PLAN OF CARE
Problem: Risk for Self Injury/Neglect  Goal: Treatment Goal: Remain safe during length of stay, learn and adopt new coping skills, and be free of self-injurious ideation, impulses and acts at the time of discharge  Outcome: Progressing  Goal: Verbalize thoughts and feelings  Description: Interventions:  - Assess and re-assess patient's lethality and potential for self-injury  - Engage patient in 1:1 interactions, daily, for a minimum of 15 minutes  - Encourage patient to express feelings, fears, frustrations, hopes  - Establish rapport/trust with patient   Outcome: Progressing  Goal: Refrain from harming self  Description: Interventions:  - Monitor patient closely, per order  - Develop a trusting relationship  - Supervise medication ingestion, monitor effects and side effects   Outcome: Progressing  Goal: Attend and participate in unit activities, including therapeutic, recreational, and educational groups  Description: Interventions:  - Provide therapeutic and educational activities daily, encourage attendance and participation, and document same in the medical record  - Obtain collateral information, encourage visitation and family involvement in care   Outcome: Progressing  Goal: Recognize maladaptive responses and adopt new coping mechanisms  Outcome: Progressing  Goal: Complete daily ADLs, including personal hygiene independently, as able  Description: Interventions:  - Observe, teach, and assist patient with ADLS  - Monitor and promote a balance of rest/activity, with adequate nutrition and elimination  Outcome: Progressing     Problem: Depression  Goal: Treatment Goal: Demonstrate behavioral control of depressive symptoms, verbalize feelings of improved mood/affect, and adopt new coping skills prior to discharge  Outcome: Progressing  Goal: Refrain from isolation  Description: Interventions:  - Develop a trusting relationship   - Encourage socialization   Outcome: Progressing  Goal: Refrain from self-neglect  Outcome: Progressing  Goal: Complete daily ADLs, including personal hygiene independently, as able  Description: Interventions:  - Observe, teach, and assist patient with ADLS  -  Monitor and promote a balance of rest/activity, with adequate nutrition and elimination   Outcome: Progressing     Problem: Anxiety  Goal: Anxiety is at manageable level  Description: Interventions:  - Assess and monitor patient's anxiety level  - Monitor for signs and symptoms (heart palpitations, chest pain, shortness of breath, headaches, nausea, feeling jumpy, restlessness, irritable, apprehensive)  - Collaborate with interdisciplinary team and initiate plan and interventions as ordered    - [place] patient to unit/surroundings  - Explain treatment plan  - Encourage participation in care  - Encourage verbalization of concerns/fears  - Identify coping mechanisms  - Assist in developing anxiety-reducing skills  - Administer/offer alternative therapies  - Limit or eliminate stimulants  Outcome: Progressing

## 2021-09-17 NOTE — NURSING NOTE
Pt appears calm, visible on unit  Requested tramadol for 7/10 pain, reported partially effective  Good intake at meal  Pt pleasant and cooperative during interactions  Reports improved mood  Denies SI  Trazodone and tylenol administered at bedtime, effective, pt appears to sleep

## 2021-09-17 NOTE — CONSULTS
ENCOUNTER DATE: 09/17/21 10:41 AM  PATIENT NAME: Milena Caputo   1955    [id number]  Age: 74 y o  Sex: male  [place] PROVIDER & AUTHOR: Jacinta Real MD  INPATIENT ATTENDING PHYSICIAN: Neal Taylor*; PRIMARYCARE PHYSICIAN: Addison Pavon MD  DATE OF ADMISSION: 9/15/2021  3:53 PM; LENGTH OF STAY: 2 days  *-*-*-*-*-*-*-*-*-*-*-*-*-*-*-*-*-*-*-*-*-*-*-*-*-*-*-*-*-*-*-*-*-*-*-*-*-*-*-*-*-*-*-*-*-*-*-*-*-*-*-*-*-*-   REASON FOR CONSULTATION:    newly identified atrial fibrillation rhythm    *-*-*-*-*-*-*-*-*-*-*-*-*-*-*-*-*-*-*-*-*-*-*-*-*-*-*-*-*-*-*-*-*-*-*-*-*-*-*-*-*-*-*-*-*-*-*-*-*-*-*-*-*-*-  CARDIAC ASSESSMENT:     1  Newly identified atrial fibrillation  2  Essential hypertension  3  Diabetes mellitus  4  Severe depression and anxiety  5  Bipolar disorder  6  Right bundle-branch block  7  Subclinical hyperthyroidism    Mr Milena Caputo  Is a 57-year-old gentleman with risk factors of large body habitus, diabetes mellitus and hypertension  who is noted to have atrial fibrillation with tendency towards faster heart rate  He gives no history that suggest angina or obstructive sleep apnea or  congestive heart failure  He has no prior history of TIA/CVA  On examination he has slightly faster heart rate  There is no evidence of significant valvular heart disease or volume overloaded state  He gives no history of excessive bruising or bleeding  His laboratory evaluation is significant for subclinical hyperthyroidism, normal renal function and electrolytes  His CHADS2 Vasc score is 3 putting a beta category of moderate to high risk of thromboembolic CVA     Patient Active Problem List   Diagnosis    Hypertension    Bipolar 1 disorder, depressed, severe ([address] )    Diabetes mellitus ([facility] [address] )    Chronic low back pain    Medical clearance for psychiatric admission    Atrial fibrillation [facility])        CARDIAC PLAN:     --   We are recommending following changes to his regimen:  ·  will discontinue amlodipine and begin him about Cardizem  mg daily  Will continue lisinopril therapy at current dose  ·  Begin anticoagulation with Eliquis 5 mg twice daily  -- Requesting transthoracic echocardiogram to assess cardiac structure and function  -- Will request for a 24-48 hour Holter monitor to assess average heart rate  --   Patient may continue treatment for his depression and anxiety  He has no activity limitation at this time  --   Will review findings of echocardiogram and Holter monitor and provide further recommendations  If patient's left ventricular function is normal and his heart rate is controlled and he is asymptomatic plan would likely be for out patient cardiology follow-up and further discussion regarding rhythm control  However if left ventricular function is compromised or if he is symptomatic due to his atrial fibrillation then will consider inpatient rhythm control  Cardiology will follow patient along on as needed basis while he is undergoing   Inpatient psychiatric care  Please reach out to cardiologist on-call if there are any questions or concerns relating to his cardiac comorbidities  *-*-*-*-*-*-*-*-*-*-*-*-*-*-*-*-*-*-*-*-*-*-*-*-*-*-*-*-*-*-*-*-*-*-*-*-*-*-*-*-*-*-*-*-*-*-*-*-*-*-*-*-*-*-  HISTORY OF PRESENT ILLNESS     Patient is a 75-year-old  /black gentleman  With prior medical history of  Essential hypertension, diabetes mellitus for 5 years, anxiety and depression, PTSD and low back pain  He presented to [address] [place] [address] emergency room yesterday with increasing depression and anxiety  He admitted to feeling suicidal without specific plan  He requested in patient psychiatric admission for management of her symptoms  He was evaluated at sent to [address] [place] [address] behavior health unit at [facility] for further evaluation and treatment    Incidentally in the emergency room at [address] [place] [address] he was noted to have irregular cardiac rhythm with ECG showing atrial fibrillation  Patient was unaware of this  Cardiology is asked to evaluate and comanage this condition  Patient is able to provide history  He denies any prior knowledge of having atrial fibrillation  He has had no coronary artery disease call the congestive heart failure or arrhythmias in the past   He denies history of TIA / CVA  From a symptom perspective he denies any chest pain, palpitation, dizziness or lightheadedness, presyncope/ syncope, orthopnea, PND or pedal edema  He also denies being over told that he snores significantly or has apneic spells during the sleep  He denies any daytime fatigue or headache except that due to his depression  He does report that his main limiting factor for activity is his back pain  He denies undergoing echocardiogram or stress test in the past       Reports that he quit smoking 15 years back and does not drink alcohol  Denies any family history of atrial fibrillation or other arrhythmias, premature coronary artery disease or congestive heart failure  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   He retired from construction  *-*-*-*-*-*-*-*-*-*-*-*-*-*-*-*-*-*-*-*-*-*-*-*-*-*-*-*-*-*-*-*-*-*-*-*-*-*-*-*-*-*-*-*-*-*-*-*-*-*-*-*-*-*  PAST MEDICAL HISTORY:     Past Medical History:   Diagnosis Date    Anxiety     Depression     Diabetes mellitus (HCC)     Hypertension     PTSD (post-traumatic stress disorder)     PAST SURGICAL HISTORY     No past surgical history on file  FAMILY HISTORY     No family history on file    SOCIAL HISTORY     Social History     Tobacco Use   Smoking Status Former Smoker   Smokeless Tobacco Never Used      Social History     Substance and Sexual Activity   Alcohol Use Yes     Social History     Substance and Sexual Activity   Drug Use No    [id number]     *-*-*-*-*-*-*-*-*-*-*-*-*-*-*-*-*-*-*-*-*-*-*-*-*-*-*-*-*-*-*-*-*-*-*-*-*-*-*-*-*-*-*-*-*-*-*-*-*-*-*-*-*-*  ALLERGIES     No Known Allergies CURRENT SCHEDULED MEDICATIONS       Current Facility-Administered Medications:     acetaminophen (TYLENOL) tablet 650 mg, 650 mg, Oral, Q4H PRN, Yanique Cabrera MD    acetaminophen (TYLENOL) tablet 650 mg, 650 mg, Oral, Q4H PRN, Yanique Cabrera MD, 650 mg at 09/17/21 1028    amLODIPine (NORVASC) tablet 10 mg, 10 mg, Oral, Daily, Mel Cardenas PA-C, 10 mg at 09/17/21 0835    gabapentin (NEURONTIN) capsule 300 mg, 300 mg, Oral, TID, Enrrique De Los Santos PA-C, 300 mg at 09/17/21 0835    insulin lispro (HumaLOG) 100 units/mL subcutaneous injection 2-12 Units, 2-12 Units, Subcutaneous, HS, Enrrique De Los Santos PA-C, 4 Units at 09/16/21 2140    insulin lispro (HumaLOG) 100 units/mL subcutaneous injection 4-20 Units, 4-20 Units, Subcutaneous, TID AC, 4 Units at 09/17/21 0835 **AND** Fingerstick Glucose (POCT), , , TID AC, Mel Cardenas PA-C    lisinopril (ZESTRIL) tablet 20 mg, 20 mg, Oral, Daily, Mel Cardenas PA-C, 20 mg at 09/17/21 0835    LORazepam (ATIVAN) injection 1 mg, 1 mg, Intramuscular, Q6H PRN Max 3/day, Yanique Cabrera MD    LORazepam (ATIVAN) tablet 0 5 mg, 0 5 mg, Oral, Q6H PRN Max 4/day, Yanique Cabrera MD    LORazepam (ATIVAN) tablet 0 5 mg, 0 5 mg, Oral, Q6H PRN, Yanique Cabrera MD    LORazepam (ATIVAN) tablet 1 mg, 1 mg, Oral, Q6H PRN Max 3/day, Yanique Cabrera MD    lurasidone (LATUDA) tablet 20 mg, 20 mg, Oral, Daily With Martha Sanchez MD, 20 mg at 09/16/21 1631    OLANZapine (ZyPREXA) IM injection 5 mg, 5 mg, Intramuscular, Q3H PRN Max 3/day, Yanique Cabrera MD    OLANZapine (ZyPREXA) tablet 2 5 mg, 2 5 mg, Oral, Q4H PRN Max 6/day, Yanique Cabrera MD    OLANZapine (ZyPREXA) tablet 5 mg, 5 mg, Oral, Q4H PRN Max 3/day, Yanique Cabrera MD    OLANZapine (ZyPREXA) tablet 5 mg, 5 mg, Oral, Q3H PRN Max 3/day, Yanique Cabrera MD    traMADol Deantwan Law) tablet 50 mg, 50 mg, Oral, Q8H PRN, Sonja Putnam José Mandujano MD, 50 mg at 21 1631    traZODone (DESYREL) tablet 50 mg, 50 mg, Oral, HS PRN, Abimael Gan MD, 50 mg at 211     *-*-*-*-*-*-*-*-*-*-*-*-*-*-*-*-*-*-*-*-*-*-*-*-*-*-*-*-*-*-*-*-*-*-*-*-*-*-*-*-*-*-*-*-*-*-*-*-*-*-*-*-*-*   REVIEW OF SYSTEMS     Positive for:   As noted above in HPI  Negative for: All remaining as reviewed below and in HPI  SYSTEM SYMPTOMS REVIEWED:  General--weight change, fever, night sweats  Respiratoryl-- Wheezing, shortness of breath, cough, URI symptoms, sputum, blood  Cardiovascular--chest pain, syncope, dyspnea on exertion, edema, decline in exercise tolerance, claudication   Gastrointestinal--persistent vomiting, diarrhea, abdominal distention, blood in stool   Muscular or skeletal--joint pain or swelling   Neurologic--headaches, syncope, abnormal movement  Hematologic--history of easy bruising and bleeding   Endocrine--thyroid enlargement, heat or cold intolerance, polyuria   Psychiatric--anxiety, depression      *-*-*-*-*-*-*-*-*-*-*-*-*-*-*-*-*-*-*-*-*-*-*-*-*-*-*-*-*-*-*-*-*-*-*-*-*-*-*-*-*-*-*-*-*-*-*-*-*-*-*-*-*-*-   VITAL SIGNS     Vitals:    21 2107 21 0638 21 0641 21 0732   BP: 152/89 (!) 181/92 114/60 146/95   BP Location: Right arm Right arm Right arm Right arm   Pulse: 61 91  72   Resp:     Temp: 98 2 °F (36 8 °C) 97 5 °F (36 4 °C) (!) 96 9 °F (36 1 °C)    TempSrc: Temporal Temporal Temporal    SpO2: 93% 90% 93%    Weight:       Height:           TEMPERATURE HISTORY 24H: Temp (24hrs), Av 5 °F (36 4 °C), Min:96 9 °F (36 1 °C), Max:98 2 °F (36 8 °C)      BLOOD PRESSURE HISTORY: Systolic (48NJP), VZH:284 , Min:114 , WXI:370    Diastolic (07QOW), ZSX:99, Min:60, Max:95      WEIGHTS:   Wt Readings from Last 25 Encounters:   09/15/21 114 kg (251 lb 1 7 oz)   17 118 kg (260 lb)        BMI: [unfilled]     I&O:     Intake/Output Summary (Last 24 hours) at 2021 1041  Last data filed at 2021 0903  Gross per 24 hour   Intake 600 ml   Output --   Net 600 ml      *-*-*-*-*-*-*-*-*-*-*-*-*-*-*-*-*-*-*-*-*-*-*-*-*-*-*-*-*-*-*-*-*-*-*-*-*-*-*-*-*-*-*-*-*-*-*-*-*-*-*-*-*-*-   PHYSICAL EXAMINATION:     General Appearance:    Alert, cooperative, no distress, appears stated age , large built, slightly obese   Head, Eyes, ENT:    No obvious abnormality, moist mucous mebranes  Neck:   Supple, no carotid bruit or JVD   Back:     Symmetric, no curvature  Lungs:     Respirations unlabored  Clear to auscultation bilaterally,    Chest wall:    No tenderness or deformity   Heart:     irregularly irregular rhythm, slightly tachycardic, S1 and S2 normal, no murmur, rub  or gallop  Abdomen:     Soft, non-tender, No obvious masses, or organomegaly   Extremities:   Extremities warm, no cyanosis or edema    Skin:   Skin color, texture, turgor normal, no rashes or lesions     *-*-*-*-*-*-*-*-*-*-*-*-*-*-*-*-*-*-*-*-*-*-*-*-*-*-*-*-*-*-*-*-*-*-*-*-*-*-*-*-*-*-*-*-*-*-*-*-*-*-*-*-*-*-   LABORATORY DATA:    I have personally reviewed the available laboratory data  CMP:  Results from last 7 days   Lab Units 09/15/21  0957   SODIUM mmol/L 139   POTASSIUM mmol/L 3 7   CHLORIDE mmol/L 109*   CO2 mmol/L 25   BUN mg/dL 9   CREATININE mg/dL 0 72   CALCIUM mg/dL 8 5   ALK PHOS U/L 58   ALT U/L 30   AST U/L 19               Cardiac Profile:       Invalid input(s): CK, CKMBP  Results from last 7 days   Lab Units 09/15/21  0957   HEMOGLOBIN A1C % 6 6*            CBC:   Results from last 7 days   Lab Units 09/15/21  0957   WBC Thousand/uL 7 14   HEMOGLOBIN g/dL 14 8   HEMATOCRIT % 43 5   PLATELETS Thousands/uL 207     PT/INR: No results found for: PT, INR, Microbiology:          *-*-*-*-*-*-*-*-*-*-*-*-*-*-*-*-*-*-*-*-*-*-*-*-*-*-*-*-*-*-*-*-*-*-*-*-*-*-*-*-*-*-*-*-*-*-*-*-*-*-*-*-*-*-   RADIOLOGY RESULTS:     No results found      *-*-*-*-*-*-*-*-*-*-*-*-*-*-*-*-*-*-*-*-*-*-*-*-*-*-*-*-*-*-*-*-*-*-*-*-*-*-*-*-*-*-*-*-*-*-*-*-*-*-*-*-*-*-    LAST ECG, ECHOCARDIOGRAM AND OTHER CARDIOLOGY TEST RESULTS:     Results for orders placed or performed during the hospital encounter of 09/15/21   ECG 12 lead   Result Value    Ventricular Rate 93    Atrial Rate     VA Interval     QRSD Interval 140    QT Interval 400    QTC Interval 497    P Axis     QRS Axis 88    T Wave Axis 65    Narrative    Atrial fibrillation pvcs  Right bundle branch block  Cannot rule out  high lateral MI   , age undetermined  Abnormal ECG  When compared with ECG of 15-SEP-2021 12:02,  QRS axis Shifted right  Possible  Lateral infarct is now Present  pvcs are now present  Confirmed by Gregory Thomson ([id number]) on 9/16/2021 5:32:03 PM    No results found for this or any previous visit  No results found for this or any previous visit  No results found for this or any previous visit  No results found for this or any previous visit           *-*-*-*-*-*-*-*-*-*-*-*-*-*-*-*-*-*-*-*-*-*-*-*-*-*-*-*-*-*-*-*-*-*-*-*-*-*-*-*-*-*-*-*-*-*-*-*-*-*-*-*-*-*-  SIGNATURES:   @QGD@   Isatu De Leon MD     CC:   MD Gamaliel Castro, *

## 2021-09-17 NOTE — PROGRESS NOTES
Progress Note - Behavioral Health   Siddharth Chester 74 y o  male MRN: [id number]  Unit/Bed#: Triny Allen 176-16 Encounter: [id number]     The patient was seen for continuing care and reviewed with treatment team   Per treatment team, patient has been focusing on his pain meds but calm and cooperative  Patient seen this morning, laying down on his back in bed  Reports, pain 5/10  Mood remains low, but he reports he got better sleep last night with the trazodone  He was able to sleep for about 6 hours total, which was interrupted twice due to pain and to use the bathroom  Appetite is improved  Energy remains low  Patient denies suicidal or homicidal ideations  He is hopeful that current medication regimen will help  States the [place] made him a little bit drowsy after dinner but resolved  No EPS, denies any side effects of medication  Current Mental Status Evaluation:  Appearance:  Marginal/poor hygiene, On his back in bed, c/o pain   Behavior:  calm and cooperative   Mood:  irritable, in pain, worried about his dose of tramadol   Affect: constricted, depressed  Speech: Soft   Thought Process:  Goal directed and coherent   Thought Content:  Does not verbalize delusional material   Perceptual Disturbances: Denies hallucinations and does not appear to be responding to internal stimuli   Risk Potential: No suicidal or homicidal ideation   Orientation:   Patient is alert and oriented x 3         Progress Toward Goals: progressing     Assessment     Patient with bipolar depression, decompensated likely secondary to medication noncompliance, pain and financial stressors  Has been restarted on Latuda  He is tolerating current medication regimen   Plan to up titrate dosage and monitor response  Patient needs continued inpatient stabilization, for medication adjustments and to ensure safe disposition      Principal Problem:    Bipolar 1 disorder, depressed, severe ([facility] [address] )  Active Problems:    Hypertension Diabetes mellitus ([facility] [address] )    Chronic low back pain    Medical clearance for psychiatric admission    Atrial fibrillation [facility])        Plan :    - Medications;   Psychiatric: Increase Latuda to 40mg Saturday   Medical : Ensure better pain management      -Therapy: occupational therapy, milieu and group therapy  - Legal: 201   -Disposition:Home, late next week tentatively

## 2021-09-18 LAB
GLUCOSE SERPL-MCNC: 153 MG/DL (ref 65–140)
GLUCOSE SERPL-MCNC: 163 MG/DL (ref 65–140)
GLUCOSE SERPL-MCNC: 171 MG/DL (ref 65–140)
GLUCOSE SERPL-MCNC: 187 MG/DL (ref 65–140)

## 2021-09-18 PROCEDURE — 82948 REAGENT STRIP/BLOOD GLUCOSE: CPT

## 2021-09-18 RX ORDER — LIDOCAINE 50 MG/G
1 PATCH TOPICAL DAILY
Status: DISCONTINUED | OUTPATIENT
Start: 2021-09-18 | End: 2021-09-21 | Stop reason: HOSPADM

## 2021-09-18 RX ADMIN — INSULIN LISPRO 4 UNITS: 100 INJECTION, SOLUTION INTRAVENOUS; SUBCUTANEOUS at 17:21

## 2021-09-18 RX ADMIN — LIDOCAINE 1 PATCH: 50 PATCH CUTANEOUS at 14:05

## 2021-09-18 RX ADMIN — APIXABAN 5 MG: 5 TABLET, FILM COATED ORAL at 08:51

## 2021-09-18 RX ADMIN — LURASIDONE HYDROCHLORIDE 40 MG: 40 TABLET, FILM COATED ORAL at 17:22

## 2021-09-18 RX ADMIN — ACETAMINOPHEN 650 MG: 325 TABLET ORAL at 11:12

## 2021-09-18 RX ADMIN — TRAMADOL HYDROCHLORIDE 50 MG: 50 TABLET, FILM COATED ORAL at 05:59

## 2021-09-18 RX ADMIN — INSULIN LISPRO 2 UNITS: 100 INJECTION, SOLUTION INTRAVENOUS; SUBCUTANEOUS at 22:07

## 2021-09-18 RX ADMIN — INSULIN LISPRO 4 UNITS: 100 INJECTION, SOLUTION INTRAVENOUS; SUBCUTANEOUS at 08:50

## 2021-09-18 RX ADMIN — LISINOPRIL 20 MG: 20 TABLET ORAL at 08:51

## 2021-09-18 RX ADMIN — GABAPENTIN 300 MG: 300 CAPSULE ORAL at 17:22

## 2021-09-18 RX ADMIN — DILTIAZEM HYDROCHLORIDE 120 MG: 120 CAPSULE, COATED, EXTENDED RELEASE ORAL at 08:51

## 2021-09-18 RX ADMIN — GABAPENTIN 300 MG: 300 CAPSULE ORAL at 21:53

## 2021-09-18 RX ADMIN — TRAMADOL HYDROCHLORIDE 50 MG: 50 TABLET, FILM COATED ORAL at 14:05

## 2021-09-18 RX ADMIN — TRAZODONE HYDROCHLORIDE 50 MG: 50 TABLET ORAL at 22:58

## 2021-09-18 RX ADMIN — TRAMADOL HYDROCHLORIDE 50 MG: 50 TABLET, FILM COATED ORAL at 22:05

## 2021-09-18 RX ADMIN — APIXABAN 5 MG: 5 TABLET, FILM COATED ORAL at 17:22

## 2021-09-18 RX ADMIN — INSULIN LISPRO 4 UNITS: 100 INJECTION, SOLUTION INTRAVENOUS; SUBCUTANEOUS at 12:41

## 2021-09-18 RX ADMIN — GABAPENTIN 300 MG: 300 CAPSULE ORAL at 08:51

## 2021-09-18 NOTE — PROGRESS NOTES
Progress Note - Behavioral Health   Tara Trevino 74 y o  male MRN: [id number]  Unit/Bed#: Eleanor Franks 455-13 Encounter: [id number]     Miranda Boy was seen for follow-up, chart reviewed and discussed with nursing staff  Nursing staff notes he has been compliant and cooperative with medications and treatment plan  No significant change over the past 24 hours  Sleeping and eating adequately  Patient was seen in his room this morning  Cooperative on approach  Reports that he has been having an increase in lower back pain and has a history of ruptured disc in his back  States that he has some difficulty sleeping at night largely due to his pain  Received p r n  Trazodone with some benefit  Reports that his moods are improving  Reports that his depression is improving, anxiety is less  Denies any adverse effects with Latuda  No suicidal or homicidal ideation  No auditory or visual hallucinations  No other physical reports of pain or discomfort      /88 (BP Location: Right arm)   Pulse (!) 115   Temp (!) 97 3 °F (36 3 °C) (Temporal)   Resp 18   Ht 6' (1 829 m)   Wt 114 kg (251 lb 1 7 oz)   SpO2 94%   BMI 34 06 kg/m²     Behavior over the last 24 hours:  improved  Sleep: normal  Appetite: normal  Medication side effects: No  ROS: Chronic low back pain    Medications:   Current Facility-Administered Medications   Medication Dose Route Frequency    acetaminophen (TYLENOL) tablet 650 mg  650 mg Oral Q4H PRN    acetaminophen (TYLENOL) tablet 650 mg  650 mg Oral Q4H PRN    apixaban (ELIQUIS) tablet 5 mg  5 mg Oral BID    diltiazem (CARDIZEM CD) 24 hr capsule 120 mg  120 mg Oral Daily    gabapentin (NEURONTIN) capsule 300 mg  300 mg Oral TID    insulin lispro (HumaLOG) 100 units/mL subcutaneous injection 2-12 Units  2-12 Units Subcutaneous HS    insulin lispro (HumaLOG) 100 units/mL subcutaneous injection 4-20 Units  4-20 Units Subcutaneous TID AC    lidocaine (LIDODERM) 5 % patch 1 patch  1 patch Topical Daily    lisinopril (ZESTRIL) tablet 20 mg  20 mg Oral Daily    LORazepam (ATIVAN) injection 1 mg  1 mg Intramuscular Q6H PRN Max 3/day    LORazepam (ATIVAN) tablet 0 5 mg  0 5 mg Oral Q6H PRN Max 4/day    LORazepam (ATIVAN) tablet 0 5 mg  0 5 mg Oral Q6H PRN    LORazepam (ATIVAN) tablet 1 mg  1 mg Oral Q6H PRN Max 3/day    lurasidone (LATUDA) tablet 40 mg  40 mg Oral Daily With Dinner    OLANZapine (ZyPREXA) IM injection 5 mg  5 mg Intramuscular Q3H PRN Max 3/day    OLANZapine (ZyPREXA) tablet 2 5 mg  2 5 mg Oral Q4H PRN Max 6/day    OLANZapine (ZyPREXA) tablet 5 mg  5 mg Oral Q4H PRN Max 3/day    OLANZapine (ZyPREXA) tablet 5 mg  5 mg Oral Q3H PRN Max 3/day    traMADol (ULTRAM) tablet 50 mg  50 mg Oral Q8H PRN    traZODone (DESYREL) tablet 50 mg  50 mg Oral HS PRN       Labs:   Admission on 09/15/2021   Component Date Value Ref Range Status    Hemoglobin A1C 09/15/2021 6 6* Normal 3 8-5 6%; PreDiabetic 5 7-6 4%;  Diabetic >=6 5%; Glycemic control for adults with diabetes <7 0% % Final    EAG 09/15/2021 143  mg/dl Final    POC Glucose 09/16/2021 231* 65 - 140 mg/dl Final    POC Glucose 09/16/2021 198* 65 - 140 mg/dl Final    Ventricular Rate 09/16/2021 93  BPM Final    QRSD Interval 09/16/2021 140  ms Final    QT Interval 09/16/2021 400  ms Final    QTC Interval 09/16/2021 497  ms Final    QRS Axis 09/16/2021 88  degrees Final    T Wave [place] 09/16/2021 65  degrees Final    POC Glucose 09/16/2021 245* 65 - 140 mg/dl Final    POC Glucose 09/17/2021 163* 65 - 140 mg/dl Final    POC Glucose 09/17/2021 165* 65 - 140 mg/dl Final    POC Glucose 09/17/2021 198* 65 - 140 mg/dl Final    POC Glucose 09/17/2021 220* 65 - 140 mg/dl Final    POC Glucose 09/18/2021 171* 65 - 140 mg/dl Final    POC Glucose 09/18/2021 153* 65 - 140 mg/dl Final       Mental Status Evaluation:  Appearance:  age appropriate and casually dressed   Behavior:  Calm, cooperative, good eye contact   Speech:  normal pitch and normal volume   Mood:  Less anxious, less depressed   Affect:  mood-congruent   Thought Process:  goal directed and logical   Thought Content:  No delusions voiced   Perceptual Disturbances: Denies auditory or visual hallucinations   Risk Potential: Suicidal Ideations none, Homicidal Ideations none and Potential for Aggression No   Sensorium:  person, place, time/date and situation   Cognition:  recent and remote memory grossly intact   Consciousness:  alert and awake    Attention: attention span and concentration were age appropriate   Insight:  fair   Judgment: fair   Gait/Station: normal gait/station   Motor Activity: no abnormal movements     Progress Toward Goals:  Improving    Assessment/Plan   Principal Problem:    Bipolar 1 disorder, depressed, severe ([facility])  Active Problems:    Hypertension    Diabetes mellitus ([facility] [address] )    Chronic low back pain    Medical clearance for psychiatric admission    Atrial fibrillation ([facility] )      Recommended Treatment: To start Latuda 40 mg dose daily with dinner today  Add Lidoderm patch for lower back pain    Continue with group therapy, milieu therapy and occupational therapy  Risks, benefits and possible side effects of Medications:   Risks, benefits, and possible side effects of medications explained to patient and patient verbalizes understanding  Counseling / Coordination of Care  Total floor / unit time spent today 30 minutes  Greater than 50% of total time was spent with the patient and / or family counseling and / or coordination of care   A description of the counseling / coordination of care:  Medication management, chart review, patient interview

## 2021-09-18 NOTE — NURSING NOTE
Pt is calm and cooperative  Pt is visible in milieu and interacts with peers and staff  Pt is med and care compliant  Pt denies SI  Will continue to monitor

## 2021-09-18 NOTE — NURSING NOTE
Patient requested and was given Tramadol PRN for low back pain 9/10 at 0559  Stated he was sleeping for about 3 hours last night  Will continue to monitor

## 2021-09-19 LAB
GLUCOSE SERPL-MCNC: 150 MG/DL (ref 65–140)
GLUCOSE SERPL-MCNC: 157 MG/DL (ref 65–140)
GLUCOSE SERPL-MCNC: 169 MG/DL (ref 65–140)
GLUCOSE SERPL-MCNC: 177 MG/DL (ref 65–140)

## 2021-09-19 PROCEDURE — 82948 REAGENT STRIP/BLOOD GLUCOSE: CPT

## 2021-09-19 RX ADMIN — INSULIN LISPRO 2 UNITS: 100 INJECTION, SOLUTION INTRAVENOUS; SUBCUTANEOUS at 21:17

## 2021-09-19 RX ADMIN — TRAMADOL HYDROCHLORIDE 50 MG: 50 TABLET, FILM COATED ORAL at 06:07

## 2021-09-19 RX ADMIN — ACETAMINOPHEN 650 MG: 325 TABLET ORAL at 21:17

## 2021-09-19 RX ADMIN — TRAZODONE HYDROCHLORIDE 50 MG: 50 TABLET ORAL at 21:16

## 2021-09-19 RX ADMIN — INSULIN LISPRO 4 UNITS: 100 INJECTION, SOLUTION INTRAVENOUS; SUBCUTANEOUS at 12:04

## 2021-09-19 RX ADMIN — GABAPENTIN 300 MG: 300 CAPSULE ORAL at 21:16

## 2021-09-19 RX ADMIN — DILTIAZEM HYDROCHLORIDE 120 MG: 120 CAPSULE, COATED, EXTENDED RELEASE ORAL at 14:24

## 2021-09-19 RX ADMIN — LISINOPRIL 20 MG: 20 TABLET ORAL at 08:20

## 2021-09-19 RX ADMIN — APIXABAN 5 MG: 5 TABLET, FILM COATED ORAL at 17:30

## 2021-09-19 RX ADMIN — LURASIDONE HYDROCHLORIDE 40 MG: 40 TABLET, FILM COATED ORAL at 17:30

## 2021-09-19 RX ADMIN — TRAMADOL HYDROCHLORIDE 50 MG: 50 TABLET, FILM COATED ORAL at 14:25

## 2021-09-19 RX ADMIN — ACETAMINOPHEN 650 MG: 325 TABLET ORAL at 12:04

## 2021-09-19 RX ADMIN — INSULIN LISPRO 4 UNITS: 100 INJECTION, SOLUTION INTRAVENOUS; SUBCUTANEOUS at 17:30

## 2021-09-19 RX ADMIN — GABAPENTIN 300 MG: 300 CAPSULE ORAL at 17:30

## 2021-09-19 RX ADMIN — GABAPENTIN 300 MG: 300 CAPSULE ORAL at 08:20

## 2021-09-19 RX ADMIN — INSULIN LISPRO 4 UNITS: 100 INJECTION, SOLUTION INTRAVENOUS; SUBCUTANEOUS at 08:21

## 2021-09-19 RX ADMIN — LIDOCAINE 1 PATCH: 50 PATCH CUTANEOUS at 08:20

## 2021-09-19 NOTE — PLAN OF CARE
Problem: Ineffective Coping  Goal: Cooperates with admission process  Description: Interventions:   - Complete admission process  Outcome: Progressing  Goal: Identifies ineffective coping skills  Outcome: Progressing  Goal: Identifies healthy coping skills  Outcome: Progressing  Goal: Demonstrates healthy coping skills  Outcome: Progressing  Goal: Participates in unit activities  Description: Interventions:  - Provide therapeutic environment   - Provide required programming   - Redirect inappropriate behaviors   Outcome: Progressing  Goal: Patient/Family participate in treatment and DC plans  Description: Interventions:  - Provide therapeutic environment  Outcome: Progressing  Goal: Patient/Family verbalizes awareness of resources  Outcome: Progressing  Goal: Understands least restrictive measures  Description: Interventions:  - Utilize least restrictive behavior  Outcome: Progressing  Goal: Free from restraint events  Description: - Utilize least restrictive measures   - Provide behavioral interventions   - Redirect inappropriate behaviors   Outcome: Progressing     Problem: Risk for Self Injury/Neglect  Goal: Treatment Goal: Remain safe during length of stay, learn and adopt new coping skills, and be free of self-injurious ideation, impulses and acts at the time of discharge  Outcome: Progressing  Goal: Verbalize thoughts and feelings  Description: Interventions:  - Assess and re-assess patient's lethality and potential for self-injury  - Engage patient in 1:1 interactions, daily, for a minimum of 15 minutes  - Encourage patient to express feelings, fears, frustrations, hopes  - Establish rapport/trust with patient   Outcome: Progressing  Goal: Refrain from harming self  Description: Interventions:  - Monitor patient closely, per order  - Develop a trusting relationship  - Supervise medication ingestion, monitor effects and side effects   Outcome: Progressing  Goal: Attend and participate in unit activities, including therapeutic, recreational, and educational groups  Description: Interventions:  - Provide therapeutic and educational activities daily, encourage attendance and participation, and document same in the medical record  - Obtain collateral information, encourage visitation and family involvement in care   Outcome: Progressing  Goal: Recognize maladaptive responses and adopt new coping mechanisms  Outcome: Progressing  Goal: Complete daily ADLs, including personal hygiene independently, as able  Description: Interventions:  - Observe, teach, and assist patient with ADLS  - Monitor and promote a balance of rest/activity, with adequate nutrition and elimination  Outcome: Progressing     Problem: Depression  Goal: Treatment Goal: Demonstrate behavioral control of depressive symptoms, verbalize feelings of improved mood/affect, and adopt new coping skills prior to discharge  Outcome: Progressing  Goal: Refrain from isolation  Description: Interventions:  - Develop a trusting relationship   - Encourage socialization   Outcome: Progressing  Goal: Refrain from self-neglect  Outcome: Progressing  Goal: Complete daily ADLs, including personal hygiene independently, as able  Description: Interventions:  - Observe, teach, and assist patient with ADLS  -  Monitor and promote a balance of rest/activity, with adequate nutrition and elimination   Outcome: Progressing     Problem: Anxiety  Goal: Anxiety is at manageable level  Description: Interventions:  - Assess and monitor patient's anxiety level  - Monitor for signs and symptoms (heart palpitations, chest pain, shortness of breath, headaches, nausea, feeling jumpy, restlessness, irritable, apprehensive)  - Collaborate with interdisciplinary team and initiate plan and interventions as ordered    - [place] patient to unit/surroundings  - Explain treatment plan  - Encourage participation in care  - Encourage verbalization of concerns/fears  - Identify coping mechanisms  - Assist in developing anxiety-reducing skills  - Administer/offer alternative therapies  - Limit or eliminate stimulants  Outcome: Progressing     Problem: DISCHARGE PLANNING - CARE MANAGEMENT  Goal: Discharge to post-acute care or home with appropriate resources  Description: INTERVENTIONS:  - Conduct assessment to determine patient/family and health care team treatment goals, and need for post-acute services based on payer coverage, community resources, and patient preferences, and barriers to discharge  - Address psychosocial, clinical, and financial barriers to discharge as identified in assessment in conjunction with the patient/family and health care team  - Arrange appropriate level of post-acute services according to patients   needs and preference and payer coverage in collaboration with the physician and health care team  - Communicate with and update the patient/family, physician, and health care team regarding progress on the discharge plan  - Arrange appropriate transportation to post-acute venues  Outcome: Progressing

## 2021-09-19 NOTE — NURSING NOTE
Patient was visible on the milieu today  He was seen watching TV in the dayroom with his peers  During morning med pass, patient verbalized frustration with having his cardiac medication regimen changed  He refused the Eliquis and Cardizem, stating, "I want my Norvasc  I'm not taking that  I have been on the same meds for 20 yrs now  I don't trust your doctors  Your equipment doesn't even work right"  Rachael Burns PA-C was made aware and to the patient's bedside  Patient was accepting of explanation from doctor and then requested his morning Cardizem dose (given- SLIM aware)  Patient was calm and cooperative with his scheduled afternoon medications  He requested a 650 mg PRN Tylenol at 1204 and 50 mg PRN Tramadol at 1425 for lower back pain- with positive effect  He denied having any depression, anxiety, hallucinations or suicidal/homicidal thoughts  Safety plan was reviewed with the patient and staff availability was reinforced

## 2021-09-19 NOTE — NURSING NOTE
Patient was visible in a small tv room, calm, compliant with care  He requested and was given Tramadol PRN for low back pain at 2205  Med was effective  Trazodone PRN  given at 2258 per pt's request for sleep was partially effective as pt reported sleeping 4 hr last night

## 2021-09-19 NOTE — PROGRESS NOTES
Progress Note - Behavioral Health   Marge Fernandez 74 y o  male MRN: [id number]  Unit/Bed#: [facility] 851-44 Encounter: [id number]    Lorenzo Segovia was seen for follow-up, chart reviewed and discussed with nursing staff  Nursing staff notes he has been calm and visible in the milieu  Staff notes he refused Cardizem and Eliquis this morning but has been compliant with all his other medications  States that his 'heart medications" are being managed by his outpatient cardiologist through [employer] and he did not want to change these medications  He is calm and cooperative on exam this morning  Reports that his moods have been improving  Reports that his depression and anxiety are less  Denies suicidal or homicidal ideation  No auditory or visual hallucinations  Reports that he slept for about 4-1/2 hours last night which is a slight improvement for him  No adverse effects noted with Latuda  Appetite is good  Visible in the milieu  Physically reports that his low back pain is improved with Lidoderm patch  No other reports of pain or discomfort      Behavior over the last 24 hours:  improved  Sleep: insomnia  Appetite: normal  Medication side effects: No  ROS: no complaints  /88 (BP Location: Left arm)   Pulse 98   Temp 97 8 °F (36 6 °C)   Resp 17   Ht 6' (1 829 m)   Wt 114 kg (251 lb 1 7 oz)   SpO2 90%   BMI 34 06 kg/m²     Medications:   Current Facility-Administered Medications   Medication Dose Route Frequency    acetaminophen (TYLENOL) tablet 650 mg  650 mg Oral Q4H PRN    acetaminophen (TYLENOL) tablet 650 mg  650 mg Oral Q4H PRN    apixaban (ELIQUIS) tablet 5 mg  5 mg Oral BID    diltiazem (CARDIZEM CD) 24 hr capsule 120 mg  120 mg Oral Daily    gabapentin (NEURONTIN) capsule 300 mg  300 mg Oral TID    insulin lispro (HumaLOG) 100 units/mL subcutaneous injection 2-12 Units  2-12 Units Subcutaneous HS    insulin lispro (HumaLOG) 100 units/mL subcutaneous injection 4-20 Units  4-20 Units Subcutaneous TID AC    lidocaine (LIDODERM) 5 % patch 1 patch  1 patch Topical Daily    lisinopril (ZESTRIL) tablet 20 mg  20 mg Oral Daily    LORazepam (ATIVAN) injection 1 mg  1 mg Intramuscular Q6H PRN Max 3/day    LORazepam (ATIVAN) tablet 0 5 mg  0 5 mg Oral Q6H PRN Max 4/day    LORazepam (ATIVAN) tablet 0 5 mg  0 5 mg Oral Q6H PRN    LORazepam (ATIVAN) tablet 1 mg  1 mg Oral Q6H PRN Max 3/day    lurasidone (LATUDA) tablet 40 mg  40 mg Oral Daily With Dinner    OLANZapine (ZyPREXA) IM injection 5 mg  5 mg Intramuscular Q3H PRN Max 3/day    OLANZapine (ZyPREXA) tablet 2 5 mg  2 5 mg Oral Q4H PRN Max 6/day    OLANZapine (ZyPREXA) tablet 5 mg  5 mg Oral Q4H PRN Max 3/day    OLANZapine (ZyPREXA) tablet 5 mg  5 mg Oral Q3H PRN Max 3/day    traMADol (ULTRAM) tablet 50 mg  50 mg Oral Q8H PRN    traZODone (DESYREL) tablet 50 mg  50 mg Oral HS PRN       Labs:   Admission on 09/15/2021   Component Date Value Ref Range Status    Hemoglobin A1C 09/15/2021 6 6* Normal 3 8-5 6%; PreDiabetic 5 7-6 4%;  Diabetic >=6 5%; Glycemic control for adults with diabetes <7 0% % Final    EAG 09/15/2021 143  mg/dl Final    POC Glucose 09/16/2021 231* 65 - 140 mg/dl Final    POC Glucose 09/16/2021 198* 65 - 140 mg/dl Final    Ventricular Rate 09/16/2021 93  BPM Final    QRSD Interval 09/16/2021 140  ms Final    QT Interval 09/16/2021 400  ms Final    QTC Interval 09/16/2021 497  ms Final    QRS Axis 09/16/2021 88  degrees Final    T Wave Beattie 09/16/2021 65  degrees Final    POC Glucose 09/16/2021 245* 65 - 140 mg/dl Final    POC Glucose 09/17/2021 163* 65 - 140 mg/dl Final    POC Glucose 09/17/2021 165* 65 - 140 mg/dl Final    POC Glucose 09/17/2021 198* 65 - 140 mg/dl Final    POC Glucose 09/17/2021 220* 65 - 140 mg/dl Final    POC Glucose 09/18/2021 171* 65 - 140 mg/dl Final    POC Glucose 09/18/2021 153* 65 - 140 mg/dl Final    POC Glucose 09/18/2021 163* 65 - 140 mg/dl Final    POC Glucose 09/18/2021 187* 65 - 140 mg/dl Final    POC Glucose 09/19/2021 157* 65 - 140 mg/dl Final    POC Glucose 09/19/2021 150* 65 - 140 mg/dl Final       Mental Status Evaluation:  Appearance:  age appropriate and casually dressed   Behavior:  Currently calm, cooperative   Speech:  Normal rate and volume   Mood:  Less anxious, less depressed   Affect:  mood-congruent   Thought Process:  goal directed   Thought Content:  No delusions voiced   Perceptual Disturbances: Denies auditory or visual hallucinations   Risk Potential: Denies suicidal or homicidal ideation no aggression   Sensorium:  person, place, time/date and situation   Cognition:  recent and remote memory grossly intact   Consciousness:  alert and awake    Attention: attention span appeared shorter than expected for age   Insight:  fair   Judgment: fair   Gait/Station: normal gait/station   Motor Activity: no abnormal movements     Progress Toward Goals:  Gradually improving    Assessment/Plan   Principal Problem:    Bipolar 1 disorder, depressed, severe ([facility] )  Active Problems:    Hypertension    Diabetes mellitus ([facility] )    Chronic low back pain    Medical clearance for psychiatric admission    Atrial fibrillation ([facility] )      Recommended Treatment:   Continue with Latuda 40 mg daily with dinner and monitor  Continue Lidoderm patch which has been helpful for his lower back pain  Spoke with nursing staff this morning and medical team will be notified regarding refusal of cardiac medications and discuss further with patient    Continue with group therapy, milieu therapy and occupational therapy  Risks, benefits and possible side effects of Medications:   Risks, benefits, and possible side effects of medications explained to patient and patient verbalizes understanding  Counseling / Coordination of Care  Total floor / unit time spent today 30 minutes  Greater than 50% of total time was spent with the patient and / or family counseling and / or coordination of care  A description of the counseling / coordination of care:  Medication management, chart review, patient interview

## 2021-09-20 LAB
GLUCOSE SERPL-MCNC: 141 MG/DL (ref 65–140)
GLUCOSE SERPL-MCNC: 158 MG/DL (ref 65–140)
GLUCOSE SERPL-MCNC: 173 MG/DL (ref 65–140)
GLUCOSE SERPL-MCNC: 203 MG/DL (ref 65–140)

## 2021-09-20 PROCEDURE — 99232 SBSQ HOSP IP/OBS MODERATE 35: CPT | Performed by: STUDENT IN AN ORGANIZED HEALTH CARE EDUCATION/TRAINING PROGRAM

## 2021-09-20 PROCEDURE — 99231 SBSQ HOSP IP/OBS SF/LOW 25: CPT | Performed by: INTERNAL MEDICINE

## 2021-09-20 PROCEDURE — 82948 REAGENT STRIP/BLOOD GLUCOSE: CPT

## 2021-09-20 RX ORDER — AMLODIPINE BESYLATE 10 MG/1
10 TABLET ORAL DAILY
Status: DISCONTINUED | OUTPATIENT
Start: 2021-09-20 | End: 2021-09-21 | Stop reason: HOSPADM

## 2021-09-20 RX ADMIN — AMLODIPINE BESYLATE 10 MG: 10 TABLET ORAL at 12:05

## 2021-09-20 RX ADMIN — INSULIN LISPRO 4 UNITS: 100 INJECTION, SOLUTION INTRAVENOUS; SUBCUTANEOUS at 17:14

## 2021-09-20 RX ADMIN — GABAPENTIN 300 MG: 300 CAPSULE ORAL at 15:54

## 2021-09-20 RX ADMIN — GABAPENTIN 300 MG: 300 CAPSULE ORAL at 21:04

## 2021-09-20 RX ADMIN — TRAMADOL HYDROCHLORIDE 50 MG: 50 TABLET, FILM COATED ORAL at 21:05

## 2021-09-20 RX ADMIN — INSULIN LISPRO 2 UNITS: 100 INJECTION, SOLUTION INTRAVENOUS; SUBCUTANEOUS at 21:13

## 2021-09-20 RX ADMIN — GABAPENTIN 300 MG: 300 CAPSULE ORAL at 08:50

## 2021-09-20 RX ADMIN — ACETAMINOPHEN 650 MG: 325 TABLET ORAL at 08:50

## 2021-09-20 RX ADMIN — INSULIN LISPRO 4 UNITS: 100 INJECTION, SOLUTION INTRAVENOUS; SUBCUTANEOUS at 12:23

## 2021-09-20 RX ADMIN — LURASIDONE HYDROCHLORIDE 60 MG: 40 TABLET, FILM COATED ORAL at 15:54

## 2021-09-20 RX ADMIN — TRAZODONE HYDROCHLORIDE 50 MG: 50 TABLET ORAL at 21:04

## 2021-09-20 RX ADMIN — LIDOCAINE 1 PATCH: 50 PATCH CUTANEOUS at 08:55

## 2021-09-20 RX ADMIN — LISINOPRIL 20 MG: 20 TABLET ORAL at 08:50

## 2021-09-20 RX ADMIN — TRAMADOL HYDROCHLORIDE 50 MG: 50 TABLET, FILM COATED ORAL at 12:05

## 2021-09-20 RX ADMIN — ACETAMINOPHEN 650 MG: 325 TABLET ORAL at 15:54

## 2021-09-20 RX ADMIN — TRAMADOL HYDROCHLORIDE 50 MG: 50 TABLET, FILM COATED ORAL at 03:51

## 2021-09-20 NOTE — CASE MANAGEMENT
Rec'd call from [facility] at [facility]; pt scheduled for outpatient appt with Dr Maria T Hinojosa at [facility] on Wed 10/6 at 330 pm  CM requested therapist for pt, informed pt must request a therapist when he meets with Dr Maria T Hinojosa  This is an in office appt

## 2021-09-20 NOTE — NURSING NOTE
Patient was visible in the milieu watching tv with no peer interaction  VSS  Rate depression 2/4, back pain 5/10, denies anxiety, SI/HI, A/V/H  Had snack  Calm and cooperative with care  Compliant with HS medications  Patient signed a 72 hours notice to withdraw from tx 9/19/21 at 20:36  Safety checks ongoing

## 2021-09-20 NOTE — CASE MANAGEMENT
Current med list faxed to Yak[facility] along with request to schedule pt's follow up appts with Dr Anderson Watt  CM notified [facility] of pt for poss d/c tomorrow

## 2021-09-20 NOTE — PLAN OF CARE
Problem: Ineffective Coping  Goal: Cooperates with admission process  Description: Interventions:   - Complete admission process  Outcome: Progressing  Goal: Demonstrates healthy coping skills  Outcome: Progressing  Goal: Patient/Family verbalizes awareness of resources  Outcome: Progressing  Goal: Understands least restrictive measures  Description: Interventions:  - Utilize least restrictive behavior  Outcome: Progressing  Goal: Free from restraint events  Description: - Utilize least restrictive measures   - Provide behavioral interventions   - Redirect inappropriate behaviors   Outcome: Progressing     Problem: Risk for Self Injury/Neglect  Goal: Treatment Goal: Remain safe during length of stay, learn and adopt new coping skills, and be free of self-injurious ideation, impulses and acts at the time of discharge  Outcome: Progressing  Goal: Verbalize thoughts and feelings  Description: Interventions:  - Assess and re-assess patient's lethality and potential for self-injury  - Engage patient in 1:1 interactions, daily, for a minimum of 15 minutes  - Encourage patient to express feelings, fears, frustrations, hopes  - Establish rapport/trust with patient   Outcome: Progressing  Goal: Refrain from harming self  Description: Interventions:  - Monitor patient closely, per order  - Develop a trusting relationship  - Supervise medication ingestion, monitor effects and side effects   Outcome: Progressing

## 2021-09-20 NOTE — TREATMENT TEAM
09/20/21 0836   Team Meeting   Meeting Type Daily Rounds   Initial Conference Date 09/20/21   Team Members Present   Team Members Present ;Nurse;;Physician;Occupational Therapist   Physician Team Member Dr Norris Team Member Lisa Alexandra RN   Care Management Team Member Florentin Vincent Work Team Member Oli Marquez   OT Team Member Jessica LUTHER   Patient/Family Present   Patient Present No   Patient's Family Present No     Upset about medication changes, denies SI, depression 2/10, signed 72hour notice 9/19 at 2026

## 2021-09-20 NOTE — NURSING NOTE
Pt very pleasant with constricted affect  Pt refused holter monitor, eliquis and cardiezem stating he does not have a-fib, Dr Stephen Zhou notified  Good appetite and steady gait  VSS   at 1100  Denied SI  Monitored for safety and support

## 2021-09-20 NOTE — PROGRESS NOTES
Progress Note - Behavioral Health   Dk Acosta 74 y o  male MRN: [id number]  Unit/Bed#: Nancy Garcia 730-07 Encounter: [id number]    The patient was seen for continuing care and reviewed with treatment team   Patient treatment team, pt is upset about his BP medication change  He doesn't believe he has Afib   Patient seen by Barbra Seals this morning,  He reports not being happy about his BP management  He has placed a 72 hour Notice and would like his PCP to manage his cardiac meds  Sleep is improving, but states had been sleeping poorly because he worries about his BP meds, mood is better on the Latuda   Appetite is improving   Energy is better   He denies suicidal or homicidal ideations  No EPS, denies any side effects of medication  Current Mental Status Evaluation:  Appearance:  Adequate hygiene and grooming   Behavior:  calm and cooperative   Mood:  irritable and anxious about med change    Affect: constricted   Speech: Soft and slow   Thought Process:  Goal directed and coherent   Thought Content:  Does not verbalize delusional material   Perceptual Disturbances: Denies hallucinations and does not appear to be responding to internal stimuli   Risk Potential: No suicidal or homicidal ideation   Orientation:   Patient is alert, awake and oriented x 3         Progress Toward Goals: progressing    Assessment    Patient with a history of bipolar disorder, admitted with severe depression  Doing well on Luly Spinner currently up titrating medication  New discovery of paroxysmal atrial fibrillation  Currently managed by Cardiology and Medicine    The patient needs continued inpatient stabilization for medication adjustments  and to ensure a safe disposition    Principal Problem:    Bipolar 1 disorder, depressed, severe ([facility] )  Active Problems:    Hypertension    Diabetes mellitus ([facility] )    Chronic low back pain    Medical clearance for psychiatric admission    Atrial fibrillation ([facility] )        Plan :    - Medications; Psychiatric:Increase Latuda to 60mg daily   Medical : seen by Cardiology , restarted on Amlodipine    -Therapy: occupational therapy, milieu and group therapy  - Legal: 201, 72 hour notice submitted yesterday    -Disposition:Home, Tuesday or Wednesday

## 2021-09-20 NOTE — PROGRESS NOTES
Progress Note - Dk Acosta 74 y o  male MRN: [id number]    Unit/Bed#: [facility] 642-16 Encounter: [id number]  Subjective:   Patient refusing diltiazem and Eliquis  Wants to be back on his amlodipine  He denies chest pain, shortness of breath, palpitations, edema or lightheadedness    Objective:   Vitals: Blood pressure 138/88, pulse 80, temperature 97 7 °F (36 5 °C), temperature source Temporal, resp  rate 16, height 6' (1 829 m), weight 114 kg (251 lb 1 7 oz), SpO2 94 %  ,Body mass index is 34 06 kg/m²  Physical exam:  Lungs-clear without wheezing rhonchi rales  Heart-regular without murmur rub or gallop  Abdomen-obese  Nontender without mass organomegaly  Extremities-no edema    Assessment:  1  Paroxysmal atrial fibrillation  New discovery  Apparently echocardiogram was never done  He has refused Holter monitor  Fortunately rate in atrial fibrillation high normal but not tachycardic  Patient on no rate control meds and has refused diltiazem and Eliquis at this point-his heart rate is quite regular today and I suspects he goes in and out of atrial fibrillation  His chads Vasc 2 score is 3 due to diabetes, hypertension and age greater than 72  2  Hypertension  Outpatient regimen includes lisinopril 20 mg daily and amlodipine 10 mg daily  He wants to go back on amlodipine and refuses to take diltiazem at this time  Plan: At this time will restart amlodipine since his blood pressure readings have been high off of a calcium channel blocker  Patient appears to understand the risks of not taking anticoagulation  He wants to see his family physician as an outpatient to discuss a change in meds we have advised  We will set up follow-up in our office upon discharge for further discussion  He would need an echocardiogram   Note I did find that he did have a negative nuclear stress test last year  Will see p r n   Here      Shari Morocho MD

## 2021-09-20 NOTE — TREATMENT TEAM
Notified of refusal os holter monitor, cardiezem and eliquis         09/20/21 1300   Service   Service Cardiology   Provider Name Dr Brain Avendaño   Pending Pathology and Pertinent Tests Comment section  (Notified of refusal of Holter Monitor, eliquis and cardiezem)

## 2021-09-20 NOTE — NURSING NOTE
Patient is alert,oriented and pleasant per his baseline  He is visible and social with peers and staff  Prn Tylenol 650 mg tab administered for H/A 6/10  No s/s of acute respiratory distress noted  Patient is able to make his needs known  He denies all signs and symptoms

## 2021-09-20 NOTE — PLAN OF CARE
Pt  Spontaneously engaged in two groups and completed both a self assessment interview and a relapse prevention plan  Pt  able to state loneliness that his lady is far away and desire to cut back on drinking    Problem: Ineffective Coping  Goal: Participates in unit activities  Description: Interventions:  - Provide therapeutic environment   - Provide required programming   - Redirect inappropriate behaviors   Outcome: Progressing

## 2021-09-21 VITALS
TEMPERATURE: 98 F | HEIGHT: 72 IN | DIASTOLIC BLOOD PRESSURE: 85 MMHG | RESPIRATION RATE: 18 BRPM | SYSTOLIC BLOOD PRESSURE: 172 MMHG | WEIGHT: 253.2 LBS | HEART RATE: 93 BPM | OXYGEN SATURATION: 90 % | BODY MASS INDEX: 34.29 KG/M2

## 2021-09-21 LAB
GLUCOSE SERPL-MCNC: 132 MG/DL (ref 65–140)
GLUCOSE SERPL-MCNC: 141 MG/DL (ref 65–140)

## 2021-09-21 PROCEDURE — 82948 REAGENT STRIP/BLOOD GLUCOSE: CPT

## 2021-09-21 PROCEDURE — 99238 HOSP IP/OBS DSCHRG MGMT 30/<: CPT | Performed by: STUDENT IN AN ORGANIZED HEALTH CARE EDUCATION/TRAINING PROGRAM

## 2021-09-21 RX ORDER — TRAZODONE HYDROCHLORIDE 50 MG/1
50 TABLET ORAL
Qty: 30 TABLET | Refills: 0 | Status: SHIPPED | OUTPATIENT
Start: 2021-09-21 | End: 2021-10-21

## 2021-09-21 RX ORDER — GABAPENTIN 300 MG/1
300 CAPSULE ORAL 3 TIMES DAILY
Qty: 30 CAPSULE | Refills: 0 | Status: SHIPPED | OUTPATIENT
Start: 2021-09-21 | End: 2021-10-01

## 2021-09-21 RX ORDER — AMLODIPINE BESYLATE 10 MG/1
10 TABLET ORAL DAILY
Qty: 30 TABLET | Refills: 0 | Status: SHIPPED | OUTPATIENT
Start: 2021-09-22 | End: 2021-10-22

## 2021-09-21 RX ADMIN — TRAMADOL HYDROCHLORIDE 50 MG: 50 TABLET, FILM COATED ORAL at 13:26

## 2021-09-21 RX ADMIN — GABAPENTIN 300 MG: 300 CAPSULE ORAL at 08:24

## 2021-09-21 RX ADMIN — LISINOPRIL 20 MG: 20 TABLET ORAL at 08:24

## 2021-09-21 RX ADMIN — TRAMADOL HYDROCHLORIDE 50 MG: 50 TABLET, FILM COATED ORAL at 05:35

## 2021-09-21 RX ADMIN — AMLODIPINE BESYLATE 10 MG: 10 TABLET ORAL at 08:24

## 2021-09-21 RX ADMIN — LIDOCAINE 1 PATCH: 50 PATCH CUTANEOUS at 08:23

## 2021-09-21 NOTE — NURSING NOTE
Patient reported to the nursing team that, he will be unable to sleep tonight due to his roommate constantly moving around  While staff was trying to talk to Mr Fontanez, he got agitated and aggressive towards this writer  This writer was able to diffuse the situation  Patient  moved to room 604-1

## 2021-09-21 NOTE — CASE MANAGEMENT
Pt scheduled for d/c today  CM met with pt, reviewed d/c today and follow up appt with Dr Simba Lopez at [facility]  CM informed pt of appt is now to be held in the office  CM also informed pt of need for him to request therapist when he sees Dr Simba Lopez  CM reviewed IMM notice; pt signed and given copy in d/c folder  Pt reports his fiance is aware of d/c today  Pt reports not wanting to sign FLORINA for her  Pt reports plans to take bus home  Pt requesting d/c scripts sent to [facility]  Pt denies any questions or concerns for d/c today

## 2021-09-21 NOTE — PLAN OF CARE
Problem: Ineffective Coping  Goal: Cooperates with admission process  Description: Interventions:   - Complete admission process  Outcome: Completed  Goal: Identifies ineffective coping skills  Outcome: Completed  Goal: Identifies healthy coping skills  Outcome: Completed  Goal: Demonstrates healthy coping skills  Outcome: Completed  Goal: Participates in unit activities  Description: Interventions:  - Provide therapeutic environment   - Provide required programming   - Redirect inappropriate behaviors   Outcome: Completed  Goal: Patient/Family participate in treatment and DC plans  Description: Interventions:  - Provide therapeutic environment  Outcome: Completed  Goal: Patient/Family verbalizes awareness of resources  Outcome: Completed  Goal: Understands least restrictive measures  Description: Interventions:  - Utilize least restrictive behavior  Outcome: Completed  Goal: Free from restraint events  Description: - Utilize least restrictive measures   - Provide behavioral interventions   - Redirect inappropriate behaviors   Outcome: Completed     Problem: Risk for Self Injury/Neglect  Goal: Treatment Goal: Remain safe during length of stay, learn and adopt new coping skills, and be free of self-injurious ideation, impulses and acts at the time of discharge  Outcome: Completed  Goal: Verbalize thoughts and feelings  Description: Interventions:  - Assess and re-assess patient's lethality and potential for self-injury  - Engage patient in 1:1 interactions, daily, for a minimum of 15 minutes  - Encourage patient to express feelings, fears, frustrations, hopes  - Establish rapport/trust with patient   Outcome: Completed  Goal: Refrain from harming self  Description: Interventions:  - Monitor patient closely, per order  - Develop a trusting relationship  - Supervise medication ingestion, monitor effects and side effects   Outcome: Completed  Goal: Attend and participate in unit activities, including therapeutic, recreational, and educational groups  Description: Interventions:  - Provide therapeutic and educational activities daily, encourage attendance and participation, and document same in the medical record  - Obtain collateral information, encourage visitation and family involvement in care   Outcome: Completed  Goal: Recognize maladaptive responses and adopt new coping mechanisms  Outcome: Completed  Goal: Complete daily ADLs, including personal hygiene independently, as able  Description: Interventions:  - Observe, teach, and assist patient with ADLS  - Monitor and promote a balance of rest/activity, with adequate nutrition and elimination  Outcome: Completed     Problem: Depression  Goal: Treatment Goal: Demonstrate behavioral control of depressive symptoms, verbalize feelings of improved mood/affect, and adopt new coping skills prior to discharge  Outcome: Completed  Goal: Refrain from isolation  Description: Interventions:  - Develop a trusting relationship   - Encourage socialization   Outcome: Completed  Goal: Refrain from self-neglect  Outcome: Completed  Goal: Complete daily ADLs, including personal hygiene independently, as able  Description: Interventions:  - Observe, teach, and assist patient with ADLS  -  Monitor and promote a balance of rest/activity, with adequate nutrition and elimination   Outcome: Completed     Problem: Anxiety  Goal: Anxiety is at manageable level  Description: Interventions:  - Assess and monitor patient's anxiety level  - Monitor for signs and symptoms (heart palpitations, chest pain, shortness of breath, headaches, nausea, feeling jumpy, restlessness, irritable, apprehensive)  - Collaborate with interdisciplinary team and initiate plan and interventions as ordered    - [place] patient to unit/surroundings  - Explain treatment plan  - Encourage participation in care  - Encourage verbalization of concerns/fears  - Identify coping mechanisms  - Assist in developing anxiety-reducing skills  - Administer/offer alternative therapies  - Limit or eliminate stimulants  Outcome: Completed     Problem: DISCHARGE PLANNING - CARE MANAGEMENT  Goal: Discharge to post-acute care or home with appropriate resources  Description: INTERVENTIONS:  - Conduct assessment to determine patient/family and health care team treatment goals, and need for post-acute services based on payer coverage, community resources, and patient preferences, and barriers to discharge  - Address psychosocial, clinical, and financial barriers to discharge as identified in assessment in conjunction with the patient/family and health care team  - Arrange appropriate level of post-acute services according to patients   needs and preference and payer coverage in collaboration with the physician and health care team  - Communicate with and update the patient/family, physician, and health care team regarding progress on the discharge plan  - Arrange appropriate transportation to post-acute venues  Outcome: Completed

## 2021-09-21 NOTE — PLAN OF CARE
Problem: DISCHARGE PLANNING - CARE MANAGEMENT  Goal: Discharge to post-acute care or home with appropriate resources  Description: INTERVENTIONS:  - Conduct assessment to determine patient/family and health care team treatment goals, and need for post-acute services based on payer coverage, community resources, and patient preferences, and barriers to discharge  - Address psychosocial, clinical, and financial barriers to discharge as identified in assessment in conjunction with the patient/family and health care team  - Arrange appropriate level of post-acute services according to patients   needs and preference and payer coverage in collaboration with the physician and health care team  - Communicate with and update the patient/family, physician, and health care team regarding progress on the discharge plan  - Arrange appropriate transportation to post-acute venues  Outcome: Adequate for Discharge     Discharge to home, pt resides alone  Pt requesting to take public bus home  Outpatient follow up with Dr Jag Jones at [facility]

## 2021-09-21 NOTE — BH TRANSITION RECORD
Contact Information: If you have any questions, concerns, pended studies, tests and/or procedures, or emergencies regarding your inpatient behavioral health visit  Please contact [facility] older adult behavioral health unit 6T [phone] and ask to speak to a , nurse or physician  A contact is available 24 hours/ 7 days a week at this number  Summary of Procedures Performed During your Stay:  Below is a list of major procedures performed during your hospital stay and a summary of results:  - No major procedures performed  Pending Studies (From admission, onward)    None        If studies are pending at discharge, follow up with your PCP and/or referring provider

## 2021-09-21 NOTE — TREATMENT TEAM
09/21/21 1055   Team Meeting   Meeting Type Daily Rounds   Initial Conference Date 09/21/21   Team Members Present   Team Members Present ;Physician;Nurse;;Occupational Therapist   Physician Team Member Dr Kamla Mcghee Team Member Clifford Ann RN   Care Management Team Member Florentin Vincent Work Team Member Ilya   OT Team Member Ok Keller   Patient/Family Present   Patient Present No   Patient's Family Present No     Pt calm, pleasant, cooperative, visible on unit  Poor sleep due to roommate, became agitated with RN but redirected, room changed  Med compliant  Plan d/c today

## 2021-09-21 NOTE — NURSING NOTE
Patient is in agreement with discharge  AVS/Discharge instructions reviewed and given to patient  Pt reports no S/S, no other concerns verbalized

## 2021-09-21 NOTE — PROGRESS NOTES
Discharge Summary - [facility] [place] 74 y o  male MRN: [id number]  Unit/Bed#: [facility] Encounter: [id number]     Admission Date: 9/15/2021         Discharge Date: 9/21/21    Attending Psychiatrist: Frank Guy MD    Reason for Admission/HPI:   Patient is a 74year old male, he has been  x 3, lives alone in apartment,  has a fiance who lives in [place], he has  5 adult children  He has  a history of bipolar disorder, keily reported in the 1970s, has had at least 5 prior psych admissions, last admission was about 9 years ago, he binge drinks alcohol  He follows with Dr Angelito Abernathy at [facility] x 8 years  PMH include HTN, HLD, chronic back pain, DM  Patient presented to the   B ED due to increased anxiety and SI   He denied specific plan in the context of medication non- compliance x 1month  Pt was admitted to the psych unit for stabilization       Patient is a fair historian  On 6T,  He reports  he has been felling down and depressed for the past month and a half  He stopped taking his Latuda  and wanted to find out if he could be ok without it as he believes he has been taking too much medication  He reports current stressors are  back pain which has been causing disruption in his sleep  He has plans( like visiting his fiance in [place]) but he is unable to accomplish them due to his financial limitations  He denies any symptoms of keily but reports a Hx of manic symptoms in the 76s  He endorses depressed mood, anhedonia, hopelessness, poor sleep with difficulties staying asleep  He denies JIAN today  He denies feeling of guilt and denies AVH  No delusions elicited        He reports he drinks alcohol about  twice a week, but binge drinks and ingest about a 1/5 of whiskey or half a gallon of wine at a time   He denies any cigarette use or Illicit drug use       Called pt's family [facility] on [address] , the reported pt picked his medication Latuda 60mg only in January and February 2021         Meds/Allergies     all current active meds have been reviewed    No Known Allergies    Objective     Vital signs in last 24 hours:  Temp:  [97 2 °F (36 2 °C)-98 °F (36 7 °C)] 98 °F (36 7 °C)  HR:  [82-95] 93  Resp:  [16-18] 18  BP: (129-172)/(80-96) 172/85      Intake/Output Summary (Last 24 hours) at 9/21/2021 1133  Last data filed at 9/21/2021 0924  Gross per 24 hour   Intake 1240 ml   Output --   Net 1240 ml       Hospital Course: The patient was admitted to the inpatient psychiatric unit and started on every 15 minutes precautions  A treatment plan was formed with focus on pharmacotherapy and milieu therapy, group therapy and individual psychotherapy when indicated  Working  diagnosis of  Bipolar depression  To address the patient's symptoms, the patient was  restarted on Latuda and was titrated up to target dose of 60mg daily with dinner 2) Pt was also given Trazodone 50mg PRN for sleep  The patient did not display self destructive or aggressive behaviors and did not require restraints  Throughout the hospitalization, the patient did not have falls  Patient's symptoms improved gradually over the hospital course, he was visible in milieu, attended groups  Patient put in 72 hour letter requesting for discharge stating he was upset about in change in his BP medication  At the end of treatment the patient was doing well  Mood was stable at the time of discharge  The patient denied suicidal ideation, intent or plan at the time of discharge and denied homicidal ideation, intent or plan at the time of discharge  There was no overt psychosis at the time of discharge  Sleep and appetite were improved  The patient was tolerating medications and was not reporting any significant side effects at the time of discharge  The patient has maximally benefitted from inpatient treatment and can be safely discharged to outpatient care   Patient is not at acute risk of harm to self or others, risk has been mitigated by medication, inpatient admission  But remains at chronic low risk due to his long history of mental illness and medication non- compliance  Patient was educated about the importance of medication compliance and follow-up with his  scheduled appointments, and to avoid binge drinking  The outpatient follow up with a psychiatrist was arranged by the unit  upon discharge  Medical Issues addressed during hospitalization : Seen for management of new onset A fib on EKG  His medications were adjusted but pt declined to follow the new recommendations  He was examined by cardiology for paroxysmal A fib, pt insisted on being maintained on his outpatient regimen and reported he prefers to follow-up with his PCP         Mental Status at Time of Discharge:   Appearance:  Adequate hygiene and grooming   Behavior:  calm and cooperative   Speech:   Language: Normal rate and Normal volume  No overt abnormality   Mood:  Euthymic   Affect:   Associations: appropriate  Tightly connected   Thought Process:  Goal directed and coherent   Thought Content:  Does not verbalize delusional material   Perceptual Disturbances: Denies hallucinations and does not appear to be responding to internal stimuli     Risk Potential: No suicidal or homicidal ideation   Attention/Concentration attention span and concentration were age appropriate   Insight:  Good insight   Judgment: Good judgment   Gait/Station: normal gait/station   Motor Activity: No abnormal movement noted       Admission Diagnosis:  Principal Problem:    Bipolar 1 disorder, depressed, severe ([facility] )  Active Problems:    Hypertension    Diabetes mellitus ([place] [address] )    Chronic low back pain    Medical clearance for psychiatric admission    Atrial fibrillation [facility])      Discharge Diagnosis:     Principal Problem:    Bipolar 1 disorder, depressed, severe ([facility] )  Active Problems:    Hypertension    Diabetes mellitus ([facility] )    Chronic low back pain    Medical clearance for psychiatric admission    Atrial fibrillation [facility])  Resolved Problems:    * No resolved hospital problems   *      Lab results:    Recent Results (from the past 336 hour(s))   Rapid drug screen, urine    Collection Time: 09/15/21  9:35 AM   Result Value Ref Range    Amph/Meth UR Negative Negative    Barbiturate Ur Negative Negative    Benzodiazepine Urine Negative Negative    Cocaine Urine Negative Negative    Methadone Urine Negative Negative    Opiate Urine Negative Negative    PCP Ur Negative Negative    THC Urine Negative Negative    Oxycodone Urine Negative Negative   UA w Reflex to Microscopic w Reflex to Culture    Collection Time: 09/15/21  9:36 AM    Specimen: Urine, Clean Catch   Result Value Ref Range    Color, UA Yellow     Clarity, UA Clear     Specific [place], UA 1 018 1 003 - 1 030    pH, UA 6 0 4 5, 5 0, 5 5, 6 0, 6 5, 7 0, 7 5, 8 0    Leukocytes, UA Negative Negative    Nitrite, UA Negative Negative    Protein,  (2+) (A) Negative mg/dl    Glucose, UA Negative Negative mg/dl    Ketones, UA Negative Negative mg/dl    Urobilinogen, UA 0 2 0 2, 1 0 E U /dl E U /dl    Bilirubin, UA Negative Negative    Blood, UA Negative Negative   Urine Microscopic    Collection Time: 09/15/21  9:36 AM   Result Value Ref Range    RBC, UA None Seen None Seen, 2-4 /hpf    WBC, UA 2-4 None Seen, 2-4, 5-60 /hpf    Epithelial Cells None Seen None Seen, Occasional /hpf    Bacteria, UA None Seen None Seen, Occasional /hpf    Hyaline Casts, UA 10-25 (A) None Seen /lpf   Novel Coron[facility]Covid-19),[facility]    Collection Time: 09/15/21  9:37 AM    Specimen: Nasopharyngeal Swab; Nares   Result Value Ref Range    SARS-CoV-2 Negative Negative   CBC and differential    Collection Time: 09/15/21  9:57 AM   Result Value Ref Range    WBC 7 14 4 31 - 10 16 Thousand/uL    RBC 5 10 3 88 - 5 62 Million/uL    Hemoglobin 14 8 12 0 - 17 0 g/dL    Hematocrit 43 5 36 5 - 49 3 %    MCV 85 82 - 98 fL    MCH 29 0 26 8 - 34 3 pg    MCHC 34 0 31 4 - 37 4 g/dL    RDW 12 9 11 6 - 15 1 %    MPV 9 5 8 9 - 12 7 fL    Platelets 154 863 - 238 Thousands/uL    nRBC 0 /100 WBCs    Neutrophils Relative 65 43 - 75 %    Immat GRANS % 0 0 - 2 %    Lymphocytes Relative 23 14 - 44 %    Monocytes Relative 10 4 - 12 %    Eosinophils Relative 2 0 - 6 %    Basophils Relative 0 0 - 1 %    Neutrophils Absolute 4 57 1 85 - 7 62 Thousands/µL    Immature Grans Absolute 0 02 0 00 - 0 20 Thousand/uL    Lymphocytes Absolute 1 67 0 60 - 4 47 Thousands/µL    Monocytes Absolute 0 73 0 17 - 1 22 Thousand/µL    Eosinophils Absolute 0 12 0 00 - 0 61 Thousand/µL    Basophils Absolute 0 03 0 00 - 0 10 Thousands/µL   Comprehensive metabolic panel    Collection Time: 09/15/21  9:57 AM   Result Value Ref Range    Sodium 139 136 - 145 mmol/L    Potassium 3 7 3 5 - 5 3 mmol/L    Chloride 109 (H) 100 - 108 mmol/L    CO2 25 21 - 32 mmol/L    ANION GAP 5 4 - 13 mmol/L    BUN 9 5 - 25 mg/dL    Creatinine 0 72 0 60 - 1 30 mg/dL    Glucose 152 (H) 65 - 140 mg/dL    Calcium 8 5 8 3 - 10 1 mg/dL    AST 19 5 - 45 U/L    ALT 30 12 - 78 U/L    Alkaline Phosphatase 58 46 - 116 U/L    Total Protein 7 2 6 4 - 8 2 g/dL    Albumin 3 9 3 5 - 5 0 g/dL    Total Bilirubin 0 68 0 20 - 1 00 mg/dL    eGFR 111 ml/min/1 73sq m   TSH, 3rd generation with Free T4 reflex    Collection Time: 09/15/21  9:57 AM   Result Value Ref Range    TSH 3RD GENERATON 0 311 (L) 0 358 - 3 740 uIU/mL   Ethanol    Collection Time: 09/15/21  9:57 AM   Result Value Ref Range    Ethanol Lvl <3 0 - 3 mg/dL   Salicylate level    Collection Time: 09/15/21  9:57 AM   Result Value Ref Range    Salicylate Lvl <3 (L) 3 - 20 mg/dL   Acetaminophen level-If concentration is detectable, please discuss with medical  on call      Collection Time: 09/15/21  9:57 AM   Result Value Ref Range    Acetaminophen Level <2 (L) 10 - 20 ug/mL   T4, free    Collection Time: 09/15/21  9:57 AM   Result Value Ref Range    Free T4 0 92 0 76 - 1 46 ng/dL   Hemoglobin A1C w/ EAG Estimation    Collection Time: 09/15/21  9:57 AM   Result Value Ref Range    Hemoglobin A1C 6 6 (H) Normal 3 8-5 6%; PreDiabetic 5 7-6 4%;  Diabetic >=6 5%; Glycemic control for adults with diabetes <7 0% %     mg/dl   POCT alcohol breath test    Collection Time: 09/15/21  9:59 AM   Result Value Ref Range    EXTBreath Alcohol 0 000    ECG 12 lead    Collection Time: 09/15/21 12:02 PM   Result Value Ref Range    Ventricular Rate 89 BPM    Atrial Rate 208 BPM    AR Interval  ms    QRSD Interval 146 ms    QT Interval 394 ms    QTC Interval 479 ms    P Axis  degrees    QRS Axis -31 degrees    T Wave Axis 29 degrees   Fingerstick Glucose (POCT)    Collection Time: 09/16/21  1:34 PM   Result Value Ref Range    POC Glucose 231 (H) 65 - 140 mg/dl   ECG 12 lead    Collection Time: 09/16/21  1:48 PM   Result Value Ref Range    Ventricular Rate 93 BPM    Atrial Rate  BPM    AR Interval  ms    QRSD Interval 140 ms    QT Interval 400 ms    QTC Interval 497 ms    P Axis  degrees    QRS Axis 88 degrees    T Wave Axis 65 degrees   Fingerstick Glucose (POCT)    Collection Time: 09/16/21  4:10 PM   Result Value Ref Range    POC Glucose 198 (H) 65 - 140 mg/dl   Fingerstick Glucose (POCT)    Collection Time: 09/16/21  9:33 PM   Result Value Ref Range    POC Glucose 245 (H) 65 - 140 mg/dl   Fingerstick Glucose (POCT)    Collection Time: 09/17/21  7:17 AM   Result Value Ref Range    POC Glucose 163 (H) 65 - 140 mg/dl   Fingerstick Glucose (POCT)    Collection Time: 09/17/21 11:47 AM   Result Value Ref Range    POC Glucose 165 (H) 65 - 140 mg/dl   Fingerstick Glucose (POCT)    Collection Time: 09/17/21  4:09 PM   Result Value Ref Range    POC Glucose 198 (H) 65 - 140 mg/dl   Fingerstick Glucose (POCT)    Collection Time: 09/17/21  9:11 PM   Result Value Ref Range    POC Glucose 220 (H) 65 - 140 mg/dl   Fingerstick Glucose (POCT)    Collection Time: 09/18/21  7:15 AM   Result Value Ref Range    POC Glucose 171 (H) 65 - 140 mg/dl   Fingerstick Glucose (POCT)    Collection Time: 09/18/21 11:18 AM   Result Value Ref Range    POC Glucose 153 (H) 65 - 140 mg/dl   Fingerstick Glucose (POCT)    Collection Time: 09/18/21  4:35 PM   Result Value Ref Range    POC Glucose 163 (H) 65 - 140 mg/dl   Fingerstick Glucose (POCT)    Collection Time: 09/18/21  9:00 PM   Result Value Ref Range    POC Glucose 187 (H) 65 - 140 mg/dl   Fingerstick Glucose (POCT)    Collection Time: 09/19/21  7:03 AM   Result Value Ref Range    POC Glucose 157 (H) 65 - 140 mg/dl   Fingerstick Glucose (POCT)    Collection Time: 09/19/21 11:33 AM   Result Value Ref Range    POC Glucose 150 (H) 65 - 140 mg/dl   Fingerstick Glucose (POCT)    Collection Time: 09/19/21  4:03 PM   Result Value Ref Range    POC Glucose 177 (H) 65 - 140 mg/dl   Fingerstick Glucose (POCT)    Collection Time: 09/19/21  9:13 PM   Result Value Ref Range    POC Glucose 169 (H) 65 - 140 mg/dl   Fingerstick Glucose (POCT)    Collection Time: 09/20/21  7:44 AM   Result Value Ref Range    POC Glucose 141 (H) 65 - 140 mg/dl   Fingerstick Glucose (POCT)    Collection Time: 09/20/21 11:51 AM   Result Value Ref Range    POC Glucose 173 (H) 65 - 140 mg/dl   Fingerstick Glucose (POCT)    Collection Time: 09/20/21  4:16 PM   Result Value Ref Range    POC Glucose 203 (H) 65 - 140 mg/dl   Fingerstick Glucose (POCT)    Collection Time: 09/20/21  9:10 PM   Result Value Ref Range    POC Glucose 158 (H) 65 - 140 mg/dl   Fingerstick Glucose (POCT)    Collection Time: 09/21/21  7:40 AM   Result Value Ref Range    POC Glucose 141 (H) 65 - 140 mg/dl     Discharge Medications:  - Latuda 60mg daily with dinner > 350 calories  - Trazodone 50mg HS  PRN sleep  See after visit summary for reconciled discharge medications provided to patient and family  Discharge instructions/Information to patient and family:     See after visit summary for information provided to patient and family  Provisions for Follow-Up Care:    See after visit summary for information related to follow-up care and any pertinent home health orders  Discharge Statement     I spent 25 minutes discharging the patient  This time was spent on the day of discharge  I had direct contact with the patient on the day of discharge  Additional documentation is required if more than 30 minutes were spent on discharge:    I reviewed with Anna Lisa importance of compliance with medications and outpatient treatment after discharge  I discussed the medication regimen and possible side effects of the medications with Anna Lisa prior to discharge  At the time of discharge he was tolerating psychiatric medications  I discussed outpatient follow up with Anna Lisa  I reviewed with Anna Lisa crisis plan and safety plan upon discharge

## 2021-09-22 NOTE — DISCHARGE SUMMARY
Discharge Summary - [address] [facility] 74 y o  male MRN: [id number]  Unit/Bed#: Agustina Sharma 795-01 Encounter: [id number]     Admission Date: 9/15/2021         Discharge Date: 9/21/21    Attending Psychiatrist: Alvis Eisenmenger, MD    Reason for Admission/HPI:copied from my initial HPI note   Patient is a 74year old male, he has been  x 3, lives alone in apartment,  has a fiance who lives in [place], he has  5 adult children  He has  a history of bipolar disorder, keily reported in the 1970s, has had at least 5 prior psych admissions, last admission was about 9 years ago, he binge drinks alcohol  He follows with Dr Matthew Adams at [facility] x 8 years  PMH include HTN, HLD, chronic back pain, DM  Patient presented to the   B ED due to increased anxiety and SI   He denied specific plan in the context of medication non- compliance x 1month  Pt was admitted to the psych unit for stabilization       Patient is a fair historian  On 6T,  He reports  he has been felling down and depressed for the past month and a half  He stopped taking his Latuda  and wanted to find out if he could be ok without it as he believes he has been taking too much medication  He reports current stressors are  back pain which has been causing disruption in his sleep  He has plans( like visiting his fiance in [place]) but he is unable to accomplish them due to his financial limitations  He denies any symptoms of keily but reports a Hx of manic symptoms in the 76s  He endorses depressed mood, anhedonia, hopelessness, poor sleep with difficulties staying asleep  He denies JIAN today  He denies feeling of guilt and denies AVH  No delusions elicited        He reports he drinks alcohol about  twice a week, but binge drinks and ingest about a 1/5 of whiskey or half a gallon of wine at a time   He denies any cigarette use or Illicit drug use       Called pt's family [facility] on [address] , the reported pt picked his medication Latuda 60mg only in January and February 2021  Meds/Allergies     all current active meds have been reviewed    No Known Allergies    Objective     Vital signs in last 24 hours:  Temp:  [97 2 °F (36 2 °C)-98 °F (36 7 °C)] 98 °F (36 7 °C)  HR:  [82-95] 93  Resp:  [16-18] 18  BP: (129-172)/(80-96) 172/85      Intake/Output Summary (Last 24 hours) at 9/21/2021 1133  Last data filed at 9/21/2021 0924  Gross per 24 hour   Intake 1240 ml   Output --   Net 1240 ml       Hospital Course: The patient was admitted to the inpatient psychiatric unit and started on every 15 minutes precautions  A treatment plan was formed with focus on pharmacotherapy and milieu therapy, group therapy and individual psychotherapy when indicated  Working  diagnosis of  Bipolar depression  To address the patient's symptoms, the patient was  restarted on Latuda and was titrated up to target dose of 60mg daily with dinner 2) Pt was also given Trazodone 50mg PRN for sleep  The patient did not display self destructive or aggressive behaviors and did not require restraints  Throughout the hospitalization, the patient did not have falls  Patient's symptoms improved gradually over the hospital course, he was visible in milieu, attended groups  Patient put in 72 hour letter requesting for discharge stating he was upset about in change in his BP medication  At the end of treatment the patient was doing well  Mood was stable at the time of discharge  The patient denied suicidal ideation, intent or plan at the time of discharge and denied homicidal ideation, intent or plan at the time of discharge  There was no overt psychosis at the time of discharge  Sleep and appetite were improved  The patient was tolerating medications and was not reporting any significant side effects at the time of discharge  The patient has maximally benefitted from inpatient treatment and can be safely discharged to outpatient care   Patient is not at acute risk of harm to self or others, risk has been mitigated by medication, inpatient admission  But remains at chronic low risk due to his long history of mental illness and medication non- compliance  Patient was educated about the importance of medication compliance and follow-up with his  scheduled appointments, and to avoid binge drinking  The outpatient follow up with a psychiatrist was arranged by the unit  upon discharge  Medical Issues addressed during hospitalization : Seen for management of new onset A fib on EKG  His medications were adjusted but pt declined to follow the new recommendations  He was examined by cardiology for paroxysmal A fib, pt insisted on being maintained on his outpatient regimen and reported he prefers to follow-up with his PCP         Mental Status at Time of Discharge:   Appearance:  Adequate hygiene and grooming   Behavior:  calm and cooperative   Speech:   Language: Normal rate and Normal volume  No overt abnormality   Mood:  Euthymic   Affect:   Associations: appropriate  Tightly connected   Thought Process:  Goal directed and coherent   Thought Content:  Does not verbalize delusional material   Perceptual Disturbances: Denies hallucinations and does not appear to be responding to internal stimuli     Risk Potential: No suicidal or homicidal ideation   Attention/Concentration attention span and concentration were age appropriate   Insight:  Good insight   Judgment: Good judgment   Gait/Station: normal gait/station   Motor Activity: No abnormal movement noted       Admission Diagnosis:  Principal Problem:    Bipolar 1 disorder, depressed, severe ([facility] )  Active Problems:    Hypertension    Diabetes mellitus ([facility] )    Chronic low back pain    Medical clearance for psychiatric admission    Atrial fibrillation [facility])      Discharge Diagnosis:     Principal Problem:    Bipolar 1 disorder, depressed, severe ([facility] )  Active Problems:    Hypertension    Diabetes mellitus ([facility] ) Chronic low back pain    Medical clearance for psychiatric admission    Atrial fibrillation [facility])  Resolved Problems:    * No resolved hospital problems   *      Lab results:    Recent Results (from the past 336 hour(s))   Rapid drug screen, urine    Collection Time: 09/15/21  9:35 AM   Result Value Ref Range    Amph/Meth UR Negative Negative    Barbiturate Ur Negative Negative    Benzodiazepine Urine Negative Negative    Cocaine Urine Negative Negative    Methadone Urine Negative Negative    Opiate Urine Negative Negative    PCP Ur Negative Negative    THC Urine Negative Negative    Oxycodone Urine Negative Negative   UA w Reflex to Microscopic w Reflex to Culture    Collection Time: 09/15/21  9:36 AM    Specimen: Urine, Clean Catch   Result Value Ref Range    Color, UA Yellow     Clarity, UA Clear     Specific [place], UA 1 018 1 003 - 1 030    pH, UA 6 0 4 5, 5 0, 5 5, 6 0, 6 5, 7 0, 7 5, 8 0    Leukocytes, UA Negative Negative    Nitrite, UA Negative Negative    Protein,  (2+) (A) Negative mg/dl    Glucose, UA Negative Negative mg/dl    Ketones, UA Negative Negative mg/dl    Urobilinogen, UA 0 2 0 2, 1 0 E U /dl E U /dl    Bilirubin, UA Negative Negative    Blood, UA Negative Negative   Urine Microscopic    Collection Time: 09/15/21  9:36 AM   Result Value Ref Range    RBC, UA None Seen None Seen, 2-4 /hpf    WBC, UA 2-4 None Seen, 2-4, 5-60 /hpf    Epithelial Cells None Seen None Seen, Occasional /hpf    Bacteria, UA None Seen None Seen, Occasional /hpf    Hyaline Casts, UA 10-25 (A) None Seen /lpf   Novel Coronavirus (Covid-19),PCR [facility]    Collection Time: 09/15/21  9:37 AM    Specimen: Nasopharyngeal Swab; Nares   Result Value Ref Range    SARS-CoV-2 Negative Negative   CBC and differential    Collection Time: 09/15/21  9:57 AM   Result Value Ref Range    WBC 7 14 4 31 - 10 16 Thousand/uL    RBC 5 10 3 88 - 5 62 Million/uL    Hemoglobin 14 8 12 0 - 17 0 g/dL    Hematocrit 43 5 36 5 - 49 3 %    MCV 85 82 - 98 fL    MCH 29 0 26 8 - 34 3 pg    MCHC 34 0 31 4 - 37 4 g/dL    RDW 12 9 11 6 - 15 1 %    MPV 9 5 8 9 - 12 7 fL    Platelets 331 181 - 654 Thousands/uL    nRBC 0 /100 WBCs    Neutrophils Relative 65 43 - 75 %    Immat GRANS % 0 0 - 2 %    Lymphocytes Relative 23 14 - 44 %    Monocytes Relative 10 4 - 12 %    Eosinophils Relative 2 0 - 6 %    Basophils Relative 0 0 - 1 %    Neutrophils Absolute 4 57 1 85 - 7 62 Thousands/µL    Immature Grans Absolute 0 02 0 00 - 0 20 Thousand/uL    Lymphocytes Absolute 1 67 0 60 - 4 47 Thousands/µL    Monocytes Absolute 0 73 0 17 - 1 22 Thousand/µL    Eosinophils Absolute 0 12 0 00 - 0 61 Thousand/µL    Basophils Absolute 0 03 0 00 - 0 10 Thousands/µL   Comprehensive metabolic panel    Collection Time: 09/15/21  9:57 AM   Result Value Ref Range    Sodium 139 136 - 145 mmol/L    Potassium 3 7 3 5 - 5 3 mmol/L    Chloride 109 (H) 100 - 108 mmol/L    CO2 25 21 - 32 mmol/L    ANION GAP 5 4 - 13 mmol/L    BUN 9 5 - 25 mg/dL    Creatinine 0 72 0 60 - 1 30 mg/dL    Glucose 152 (H) 65 - 140 mg/dL    Calcium 8 5 8 3 - 10 1 mg/dL    AST 19 5 - 45 U/L    ALT 30 12 - 78 U/L    Alkaline Phosphatase 58 46 - 116 U/L    Total Protein 7 2 6 4 - 8 2 g/dL    Albumin 3 9 3 5 - 5 0 g/dL    Total Bilirubin 0 68 0 20 - 1 00 mg/dL    eGFR 111 ml/min/1 73sq m   TSH, 3rd generation with Free T4 reflex    Collection Time: 09/15/21  9:57 AM   Result Value Ref Range    TSH 3RD GENERATON 0 311 (L) 0 358 - 3 740 uIU/mL   Ethanol    Collection Time: 09/15/21  9:57 AM   Result Value Ref Range    Ethanol Lvl <3 0 - 3 mg/dL   Salicylate level    Collection Time: 09/15/21  9:57 AM   Result Value Ref Range    Salicylate Lvl <3 (L) 3 - 20 mg/dL   Acetaminophen level-If concentration is detectable, please discuss with medical  on call      Collection Time: 09/15/21  9:57 AM   Result Value Ref Range    Acetaminophen Level <2 (L) 10 - 20 ug/mL   T4, free    Collection Time: 09/15/21  9:57 AM   Result Value Ref Range    Free T4 0 92 0 76 - 1 46 ng/dL   Hemoglobin A1C w/ EAG Estimation    Collection Time: 09/15/21  9:57 AM   Result Value Ref Range    Hemoglobin A1C 6 6 (H) Normal 3 8-5 6%; PreDiabetic 5 7-6 4%;  Diabetic >=6 5%; Glycemic control for adults with diabetes <7 0% %     mg/dl   POCT alcohol breath test    Collection Time: 09/15/21  9:59 AM   Result Value Ref Range    EXTBreath Alcohol 0 000    ECG 12 lead    Collection Time: 09/15/21 12:02 PM   Result Value Ref Range    Ventricular Rate 89 BPM    Atrial Rate 208 BPM    MD Interval  ms    QRSD Interval 146 ms    QT Interval 394 ms    QTC Interval 479 ms    P Axis  degrees    QRS Axis -31 degrees    T Wave Axis 29 degrees   Fingerstick Glucose (POCT)    Collection Time: 09/16/21  1:34 PM   Result Value Ref Range    POC Glucose 231 (H) 65 - 140 mg/dl   ECG 12 lead    Collection Time: 09/16/21  1:48 PM   Result Value Ref Range    Ventricular Rate 93 BPM    Atrial Rate  BPM    MD Interval  ms    QRSD Interval 140 ms    QT Interval 400 ms    QTC Interval 497 ms    P Axis  degrees    QRS Axis 88 degrees    T Wave Axis 65 degrees   Fingerstick Glucose (POCT)    Collection Time: 09/16/21  4:10 PM   Result Value Ref Range    POC Glucose 198 (H) 65 - 140 mg/dl   Fingerstick Glucose (POCT)    Collection Time: 09/16/21  9:33 PM   Result Value Ref Range    POC Glucose 245 (H) 65 - 140 mg/dl   Fingerstick Glucose (POCT)    Collection Time: 09/17/21  7:17 AM   Result Value Ref Range    POC Glucose 163 (H) 65 - 140 mg/dl   Fingerstick Glucose (POCT)    Collection Time: 09/17/21 11:47 AM   Result Value Ref Range    POC Glucose 165 (H) 65 - 140 mg/dl   Fingerstick Glucose (POCT)    Collection Time: 09/17/21  4:09 PM   Result Value Ref Range    POC Glucose 198 (H) 65 - 140 mg/dl   Fingerstick Glucose (POCT)    Collection Time: 09/17/21  9:11 PM   Result Value Ref Range    POC Glucose 220 (H) 65 - 140 mg/dl   Fingerstick Glucose (POCT)    Collection Time: 09/18/21  7:15 AM Result Value Ref Range    POC Glucose 171 (H) 65 - 140 mg/dl   Fingerstick Glucose (POCT)    Collection Time: 09/18/21 11:18 AM   Result Value Ref Range    POC Glucose 153 (H) 65 - 140 mg/dl   Fingerstick Glucose (POCT)    Collection Time: 09/18/21  4:35 PM   Result Value Ref Range    POC Glucose 163 (H) 65 - 140 mg/dl   Fingerstick Glucose (POCT)    Collection Time: 09/18/21  9:00 PM   Result Value Ref Range    POC Glucose 187 (H) 65 - 140 mg/dl   Fingerstick Glucose (POCT)    Collection Time: 09/19/21  7:03 AM   Result Value Ref Range    POC Glucose 157 (H) 65 - 140 mg/dl   Fingerstick Glucose (POCT)    Collection Time: 09/19/21 11:33 AM   Result Value Ref Range    POC Glucose 150 (H) 65 - 140 mg/dl   Fingerstick Glucose (POCT)    Collection Time: 09/19/21  4:03 PM   Result Value Ref Range    POC Glucose 177 (H) 65 - 140 mg/dl   Fingerstick Glucose (POCT)    Collection Time: 09/19/21  9:13 PM   Result Value Ref Range    POC Glucose 169 (H) 65 - 140 mg/dl   Fingerstick Glucose (POCT)    Collection Time: 09/20/21  7:44 AM   Result Value Ref Range    POC Glucose 141 (H) 65 - 140 mg/dl   Fingerstick Glucose (POCT)    Collection Time: 09/20/21 11:51 AM   Result Value Ref Range    POC Glucose 173 (H) 65 - 140 mg/dl   Fingerstick Glucose (POCT)    Collection Time: 09/20/21  4:16 PM   Result Value Ref Range    POC Glucose 203 (H) 65 - 140 mg/dl   Fingerstick Glucose (POCT)    Collection Time: 09/20/21  9:10 PM   Result Value Ref Range    POC Glucose 158 (H) 65 - 140 mg/dl   Fingerstick Glucose (POCT)    Collection Time: 09/21/21  7:40 AM   Result Value Ref Range    POC Glucose 141 (H) 65 - 140 mg/dl     Discharge Medications:  - Latuda 60mg daily with dinner > 350 calories  - Trazodone 50mg HS  PRN sleep  See after visit summary for reconciled discharge medications provided to patient and family        Discharge instructions/Information to patient and family:     See after visit summary for information provided to patient and family  Provisions for Follow-Up Care:    See after visit summary for information related to follow-up care and any pertinent home health orders  Discharge Statement     I spent 25 minutes discharging the patient  This time was spent on the day of discharge  I had direct contact with the patient on the day of discharge  Additional documentation is required if more than 30 minutes were spent on discharge:    I reviewed with Alberto Barraza importance of compliance with medications and outpatient treatment after discharge  I discussed the medication regimen and possible side effects of the medications with Alberto Barraza prior to discharge  At the time of discharge he was tolerating psychiatric medications  I discussed outpatient follow up with Alberto Barraza  I reviewed with Alberto Barraza crisis plan and safety plan upon discharge

## 2021-10-06 ENCOUNTER — TELEPHONE (OUTPATIENT)
Dept: CARDIOLOGY CLINIC | Facility: CLINIC | Age: 66
End: 2021-10-06

## 2021-10-08 ENCOUNTER — TELEPHONE (OUTPATIENT)
Dept: CARDIOLOGY CLINIC | Facility: CLINIC | Age: 66
End: 2021-10-08

## 2021-10-20 ENCOUNTER — OFFICE VISIT (OUTPATIENT)
Dept: DENTISTRY | Facility: CLINIC | Age: 66
End: 2021-10-20

## 2021-10-20 VITALS — DIASTOLIC BLOOD PRESSURE: 99 MMHG | TEMPERATURE: 96.9 F | SYSTOLIC BLOOD PRESSURE: 151 MMHG | HEART RATE: 88 BPM

## 2021-10-20 DIAGNOSIS — K08.401 PARTIAL EDENTULISM, CLASS I: Primary | ICD-10-CM

## 2021-10-20 PROCEDURE — WIS5003 WAX TRY-IN: Performed by: DENTIST

## 2022-01-10 ENCOUNTER — CLINICAL SUPPORT (OUTPATIENT)
Dept: DENTISTRY | Facility: CLINIC | Age: 67
End: 2022-01-10

## 2022-01-10 VITALS — HEART RATE: 92 BPM | TEMPERATURE: 98.4 F | SYSTOLIC BLOOD PRESSURE: 128 MMHG | DIASTOLIC BLOOD PRESSURE: 91 MMHG

## 2022-01-10 DIAGNOSIS — K08.409 PARTIAL EDENTULISM, UNSPECIFIED EDENTULISM CLASS: ICD-10-CM

## 2022-01-10 DIAGNOSIS — Z01.20 ENCOUNTER FOR DENTAL EXAMINATION: Primary | ICD-10-CM

## 2022-01-10 PROCEDURE — D5212 MANDIBULAR PARTIAL DENTURE - RESIN BASE (INCLUDING, RETENTIVE/CLASPING MATERIALS, RESTS, AND TEETH): HCPCS | Performed by: DENTAL HYGIENIST

## 2022-01-10 PROCEDURE — D5211 MAXILLARY PARTIAL DENTURE - RESIN BASE (INCLUDING, RETENTIVE/CLASPING MATERIALS, RESTS, AND TEETH): HCPCS | Performed by: DENTAL HYGIENIST

## 2022-01-10 NOTE — PROGRESS NOTES
Pt presents to dental clinic for delivery of maxillary and mandibular resin based partials  No changes in medical history  Delivered Maxillary and mandibular resin based partials  Adjusted flanges on lower partial  Checked occlusion  Tightened clasp on lower left premolar  Pt was instructed on how to insert and remove denture and home care and adjustments if needed       NV- denture adjustment if needed   NNV- recall

## 2022-02-17 ENCOUNTER — OFFICE VISIT (OUTPATIENT)
Dept: DENTISTRY | Facility: CLINIC | Age: 67
End: 2022-02-17

## 2022-02-17 VITALS — DIASTOLIC BLOOD PRESSURE: 93 MMHG | SYSTOLIC BLOOD PRESSURE: 151 MMHG | HEART RATE: 92 BPM | TEMPERATURE: 97.5 F

## 2022-02-17 DIAGNOSIS — Z01.20 ENCOUNTER FOR DENTAL EXAMINATION: Primary | ICD-10-CM

## 2022-02-17 PROCEDURE — D0274 BITEWINGS - 4 RADIOGRAPHIC IMAGES: HCPCS

## 2022-02-17 PROCEDURE — D1330 ORAL HYGIENE INSTRUCTIONS: HCPCS

## 2022-02-17 PROCEDURE — D1110 PROPHYLAXIS - ADULT: HCPCS

## 2022-02-17 PROCEDURE — D0120 PERIODIC ORAL EVALUATION - ESTABLISHED PATIENT: HCPCS | Performed by: DENTIST

## 2022-02-17 NOTE — PROGRESS NOTES
[place]    Dental procedures in this visit     - PROPHYLAXIS - ADULT (Completed)     Service provider: Bonnie Austin     Billing provider: Celeste Bedoya, DMD     - ORAL HYGIENE INSTRUCTIONS (Completed)     Service provider: Bonnie Austin     Billing provider: Celeste Bedoya DMD     - PERIODIC ORAL EVALUATION - ESTABLISHED PATIENT (Completed)     Service provider: Jenni Wise, [address] [place] [address]     Billing provider: Celeste Bedoya DMD     - BITEWINGS - 4 RADIOGRAPHIC IMAGES (Completed)     Service provider: Bonnie Austin     Billing provider: Celeste Bedoya DMD       Method Used:  · Prophy Method Used: Ultrasonic Scaling  · Hand Scaling  · Polished  · Flossed    Radiographs Taken:  · Bitewings x4    Intra/Extra Oral Cancer Screening:  · Within normal limits    Oral Hygiene:  · Good    Plaque:  · Light    Calculus:  · Light    Bleeding:  · Bleeding on probing: No periodontal exam for this visit  · Localized    Stain:  · None    Patient BP slightly elevated 151/93 per Dr Maryland Simmonds as long as no heavy bleeding he's fine and recc have patient follow up w/PCP   Reviewed OHI, patient is happy w/upper   Part & Lower par, also Dr CASSIDY said they fit christophe recc 6m     Exam by Dr Ashley Cortes

## 2022-09-07 ENCOUNTER — OFFICE VISIT (OUTPATIENT)
Dept: DENTISTRY | Facility: CLINIC | Age: 67
End: 2022-09-07

## 2022-09-07 VITALS — TEMPERATURE: 98.7 F | SYSTOLIC BLOOD PRESSURE: 132 MMHG | HEART RATE: 99 BPM | DIASTOLIC BLOOD PRESSURE: 88 MMHG

## 2022-09-07 DIAGNOSIS — Z01.20 ENCOUNTER FOR DENTAL EXAMINATION: Primary | ICD-10-CM

## 2022-09-07 PROCEDURE — D0120 PERIODIC ORAL EVALUATION - ESTABLISHED PATIENT: HCPCS | Performed by: DENTIST

## 2022-09-07 PROCEDURE — D1110 PROPHYLAXIS - ADULT: HCPCS

## 2022-09-07 NOTE — PROGRESS NOTES
PERIODIC EXAM, ADULT PROPHY,    REVIEWED MED HX: meds, allergies, health changes reviewed in EPIC  CHIEF CONCERN:  none   PAIN SCALE:  0  ASA CLASS:  I  PLAQUE:  Mild  CALCULUS:   light calculus  BLEEDING:   none  STAIN :   none   ORAL HYGIENE:  good   PERIO:     Hand scaled, polished and flossed  Oral Hygiene Instruction:  recommended brushing 2 x daily for 2 minutes MIN, recommended flossing daily, reviewed dietary precautions  Visual and Tactile Intraoral/ Extraoral evaluation: Oral and Oropharyngeal cancer evaluation  No findings     Dr Guilherme Denise exam=   Reviewed with patient clinical and radiographic findings and patient verbalized understanding  All questions and concerns addressed     Maxillary and mandibular partials fit fine     REFERRALS: no referrals needed    CARIES FINDINGS: caries noted #21 DO (old restoration) Dr Guilherme Denise told patient to bring his partial at restorative appt     Next Visit: Restorative #21 DO     Next Recall: 6 month recall     Last BWX: 2/17/2022  Last  FMX : 1/18/2021

## 2025-03-14 ENCOUNTER — HOSPITAL ENCOUNTER (EMERGENCY)
Facility: HOSPITAL | Age: 70
Discharge: ANOTHER HOSPITAL | DRG: 897 | End: 2025-03-14
Attending: EMERGENCY MEDICINE
Payer: MEDICARE

## 2025-03-14 ENCOUNTER — HOSPITAL ENCOUNTER (INPATIENT)
Facility: HOSPITAL | Age: 70
LOS: 2 days | Discharge: HOME | DRG: 897 | End: 2025-03-16
Attending: STUDENT IN AN ORGANIZED HEALTH CARE EDUCATION/TRAINING PROGRAM | Admitting: STUDENT IN AN ORGANIZED HEALTH CARE EDUCATION/TRAINING PROGRAM
Payer: MEDICARE

## 2025-03-14 VITALS
HEART RATE: 84 BPM | SYSTOLIC BLOOD PRESSURE: 135 MMHG | DIASTOLIC BLOOD PRESSURE: 84 MMHG | TEMPERATURE: 98 F | OXYGEN SATURATION: 98 % | RESPIRATION RATE: 18 BRPM

## 2025-03-14 DIAGNOSIS — F10.10 ALCOHOL ABUSE: Primary | ICD-10-CM

## 2025-03-14 DIAGNOSIS — F11.20 METHADONE DEPENDENCE (HCC): ICD-10-CM

## 2025-03-14 DIAGNOSIS — F11.11 HX OF OPIOID ABUSE (HCC): ICD-10-CM

## 2025-03-14 DIAGNOSIS — R45.851 SUICIDAL IDEATION: ICD-10-CM

## 2025-03-14 PROBLEM — E11.39 TYPE 2 DIABETES MELLITUS WITH OPHTHALMIC COMPLICATION, WITHOUT LONG-TERM CURRENT USE OF INSULIN (HCC): Status: ACTIVE | Noted: 2019-01-28

## 2025-03-14 LAB
ALBUMIN SERPL BCG-MCNC: 4 G/DL (ref 3.5–5)
ALP SERPL-CCNC: 52 U/L (ref 34–104)
ALT SERPL W P-5'-P-CCNC: 21 U/L (ref 7–52)
ANION GAP SERPL CALCULATED.3IONS-SCNC: 10 MMOL/L (ref 4–13)
AST SERPL W P-5'-P-CCNC: 24 U/L (ref 13–39)
ATRIAL RATE: 96 BPM
BASOPHILS # BLD AUTO: 0.02 THOUSANDS/ÂΜL (ref 0–0.1)
BASOPHILS NFR BLD AUTO: 0 % (ref 0–1)
BILIRUB SERPL-MCNC: 0.42 MG/DL (ref 0.2–1)
BUN SERPL-MCNC: 15 MG/DL (ref 5–25)
CALCIUM SERPL-MCNC: 8.6 MG/DL (ref 8.4–10.2)
CHLORIDE SERPL-SCNC: 101 MMOL/L (ref 96–108)
CO2 SERPL-SCNC: 28 MMOL/L (ref 21–32)
CREAT SERPL-MCNC: 0.67 MG/DL (ref 0.6–1.3)
EOSINOPHIL # BLD AUTO: 0.17 THOUSAND/ÂΜL (ref 0–0.61)
EOSINOPHIL NFR BLD AUTO: 3 % (ref 0–6)
ERYTHROCYTE [DISTWIDTH] IN BLOOD BY AUTOMATED COUNT: 14.6 % (ref 11.6–15.1)
ETHANOL SERPL-MCNC: 189 MG/DL
GFR SERPL CREATININE-BSD FRML MDRD: 95 ML/MIN/1.73SQ M
GLUCOSE SERPL-MCNC: 89 MG/DL (ref 65–140)
GLUCOSE SERPL-MCNC: 92 MG/DL (ref 65–140)
HCT VFR BLD AUTO: 34.3 % (ref 36.5–49.3)
HGB BLD-MCNC: 11.3 G/DL (ref 12–17)
IMM GRANULOCYTES # BLD AUTO: 0.02 THOUSAND/UL (ref 0–0.2)
IMM GRANULOCYTES NFR BLD AUTO: 0 % (ref 0–2)
LYMPHOCYTES # BLD AUTO: 1.5 THOUSANDS/ÂΜL (ref 0.6–4.47)
LYMPHOCYTES NFR BLD AUTO: 30 % (ref 14–44)
MAGNESIUM SERPL-MCNC: 2.1 MG/DL (ref 1.9–2.7)
MCH RBC QN AUTO: 29 PG (ref 26.8–34.3)
MCHC RBC AUTO-ENTMCNC: 32.9 G/DL (ref 31.4–37.4)
MCV RBC AUTO: 88 FL (ref 82–98)
MONOCYTES # BLD AUTO: 0.7 THOUSAND/ÂΜL (ref 0.17–1.22)
MONOCYTES NFR BLD AUTO: 14 % (ref 4–12)
NEUTROPHILS # BLD AUTO: 2.66 THOUSANDS/ÂΜL (ref 1.85–7.62)
NEUTS SEG NFR BLD AUTO: 53 % (ref 43–75)
NRBC BLD AUTO-RTO: 0 /100 WBCS
P AXIS: 60 DEGREES
PLATELET # BLD AUTO: 172 THOUSANDS/UL (ref 149–390)
PMV BLD AUTO: 9.4 FL (ref 8.9–12.7)
POTASSIUM SERPL-SCNC: 3.6 MMOL/L (ref 3.5–5.3)
PR INTERVAL: 164 MS
PROT SERPL-MCNC: 6.9 G/DL (ref 6.4–8.4)
QRS AXIS: -35 DEGREES
QRSD INTERVAL: 154 MS
QT INTERVAL: 432 MS
QTC INTERVAL: 546 MS
RBC # BLD AUTO: 3.9 MILLION/UL (ref 3.88–5.62)
SODIUM SERPL-SCNC: 139 MMOL/L (ref 135–147)
T WAVE AXIS: 4 DEGREES
VENTRICULAR RATE: 96 BPM
WBC # BLD AUTO: 5.07 THOUSAND/UL (ref 4.31–10.16)

## 2025-03-14 PROCEDURE — 36415 COLL VENOUS BLD VENIPUNCTURE: CPT

## 2025-03-14 PROCEDURE — 99285 EMERGENCY DEPT VISIT HI MDM: CPT | Performed by: EMERGENCY MEDICINE

## 2025-03-14 PROCEDURE — 96375 TX/PRO/DX INJ NEW DRUG ADDON: CPT

## 2025-03-14 PROCEDURE — 82948 REAGENT STRIP/BLOOD GLUCOSE: CPT

## 2025-03-14 PROCEDURE — 93005 ELECTROCARDIOGRAM TRACING: CPT

## 2025-03-14 PROCEDURE — 83735 ASSAY OF MAGNESIUM: CPT

## 2025-03-14 PROCEDURE — 80053 COMPREHEN METABOLIC PANEL: CPT

## 2025-03-14 PROCEDURE — HZ2ZZZZ DETOXIFICATION SERVICES FOR SUBSTANCE ABUSE TREATMENT: ICD-10-PCS | Performed by: STUDENT IN AN ORGANIZED HEALTH CARE EDUCATION/TRAINING PROGRAM

## 2025-03-14 PROCEDURE — 99223 1ST HOSP IP/OBS HIGH 75: CPT | Performed by: STUDENT IN AN ORGANIZED HEALTH CARE EDUCATION/TRAINING PROGRAM

## 2025-03-14 PROCEDURE — 85025 COMPLETE CBC W/AUTO DIFF WBC: CPT

## 2025-03-14 PROCEDURE — 83036 HEMOGLOBIN GLYCOSYLATED A1C: CPT

## 2025-03-14 PROCEDURE — 93010 ELECTROCARDIOGRAM REPORT: CPT | Performed by: INTERNAL MEDICINE

## 2025-03-14 PROCEDURE — 96374 THER/PROPH/DIAG INJ IV PUSH: CPT

## 2025-03-14 PROCEDURE — 82077 ASSAY SPEC XCP UR&BREATH IA: CPT

## 2025-03-14 PROCEDURE — 99285 EMERGENCY DEPT VISIT HI MDM: CPT

## 2025-03-14 RX ORDER — PHENOBARBITAL SODIUM 130 MG/ML
130 INJECTION, SOLUTION INTRAMUSCULAR; INTRAVENOUS ONCE
Status: COMPLETED | OUTPATIENT
Start: 2025-03-14 | End: 2025-03-14

## 2025-03-14 RX ORDER — MAGNESIUM HYDROXIDE/ALUMINUM HYDROXICE/SIMETHICONE 120; 1200; 1200 MG/30ML; MG/30ML; MG/30ML
30 SUSPENSION ORAL EVERY 6 HOURS PRN
Status: DISCONTINUED | OUTPATIENT
Start: 2025-03-14 | End: 2025-03-16 | Stop reason: HOSPADM

## 2025-03-14 RX ORDER — ONDANSETRON 2 MG/ML
4 INJECTION INTRAMUSCULAR; INTRAVENOUS EVERY 6 HOURS PRN
Status: DISCONTINUED | OUTPATIENT
Start: 2025-03-14 | End: 2025-03-16 | Stop reason: HOSPADM

## 2025-03-14 RX ORDER — LIDOCAINE 50 MG/G
1 PATCH TOPICAL ONCE
Status: DISCONTINUED | OUTPATIENT
Start: 2025-03-14 | End: 2025-03-14 | Stop reason: HOSPADM

## 2025-03-14 RX ORDER — INSULIN LISPRO 100 [IU]/ML
1-5 INJECTION, SOLUTION INTRAVENOUS; SUBCUTANEOUS
Status: DISCONTINUED | OUTPATIENT
Start: 2025-03-14 | End: 2025-03-16 | Stop reason: HOSPADM

## 2025-03-14 RX ORDER — ENOXAPARIN SODIUM 100 MG/ML
40 INJECTION SUBCUTANEOUS DAILY
Status: DISCONTINUED | OUTPATIENT
Start: 2025-03-15 | End: 2025-03-16 | Stop reason: HOSPADM

## 2025-03-14 RX ORDER — OLANZAPINE 10 MG/2ML
5 INJECTION, POWDER, FOR SOLUTION INTRAMUSCULAR
Status: DISCONTINUED | OUTPATIENT
Start: 2025-03-14 | End: 2025-03-16 | Stop reason: HOSPADM

## 2025-03-14 RX ORDER — INSULIN LISPRO 100 [IU]/ML
1-6 INJECTION, SOLUTION INTRAVENOUS; SUBCUTANEOUS
Status: DISCONTINUED | OUTPATIENT
Start: 2025-03-15 | End: 2025-03-16 | Stop reason: HOSPADM

## 2025-03-14 RX ORDER — CLONIDINE HYDROCHLORIDE 0.1 MG/1
0.1 TABLET ORAL EVERY 6 HOURS PRN
Status: DISCONTINUED | OUTPATIENT
Start: 2025-03-14 | End: 2025-03-16 | Stop reason: HOSPADM

## 2025-03-14 RX ORDER — METHOCARBAMOL 750 MG/1
750 TABLET, FILM COATED ORAL EVERY 6 HOURS PRN
Status: DISCONTINUED | OUTPATIENT
Start: 2025-03-14 | End: 2025-03-16 | Stop reason: HOSPADM

## 2025-03-14 RX ORDER — METHOCARBAMOL 750 MG/1
750 TABLET, FILM COATED ORAL 3 TIMES DAILY PRN
COMMUNITY
Start: 2025-03-10

## 2025-03-14 RX ORDER — DIAZEPAM 10 MG/2ML
5 INJECTION, SOLUTION INTRAMUSCULAR; INTRAVENOUS ONCE
Status: COMPLETED | OUTPATIENT
Start: 2025-03-14 | End: 2025-03-14

## 2025-03-14 RX ORDER — FOLIC ACID 1 MG/1
1 TABLET ORAL DAILY
Status: DISCONTINUED | OUTPATIENT
Start: 2025-03-15 | End: 2025-03-16 | Stop reason: HOSPADM

## 2025-03-14 RX ORDER — ACETAMINOPHEN 325 MG/1
650 TABLET ORAL EVERY 4 HOURS PRN
Status: DISCONTINUED | OUTPATIENT
Start: 2025-03-14 | End: 2025-03-15

## 2025-03-14 RX ORDER — GABAPENTIN 300 MG/1
300 CAPSULE ORAL EVERY 8 HOURS PRN
Status: DISCONTINUED | OUTPATIENT
Start: 2025-03-14 | End: 2025-03-16 | Stop reason: HOSPADM

## 2025-03-14 RX ORDER — ROSUVASTATIN CALCIUM 10 MG/1
10 TABLET, COATED ORAL DAILY
COMMUNITY
Start: 2024-03-26 | End: 2025-03-26

## 2025-03-14 RX ORDER — LANOLIN ALCOHOL/MO/W.PET/CERES
100 CREAM (GRAM) TOPICAL DAILY
Status: DISCONTINUED | OUTPATIENT
Start: 2025-03-15 | End: 2025-03-16 | Stop reason: HOSPADM

## 2025-03-14 RX ORDER — KETOROLAC TROMETHAMINE 30 MG/ML
15 INJECTION, SOLUTION INTRAMUSCULAR; INTRAVENOUS ONCE
Status: COMPLETED | OUTPATIENT
Start: 2025-03-14 | End: 2025-03-14

## 2025-03-14 RX ORDER — LISINOPRIL 20 MG/1
20 TABLET ORAL DAILY
Status: DISCONTINUED | OUTPATIENT
Start: 2025-03-15 | End: 2025-03-16 | Stop reason: HOSPADM

## 2025-03-14 RX ORDER — SODIUM CHLORIDE, SODIUM GLUCONATE, SODIUM ACETATE, POTASSIUM CHLORIDE, MAGNESIUM CHLORIDE, SODIUM PHOSPHATE, DIBASIC, AND POTASSIUM PHOSPHATE .53; .5; .37; .037; .03; .012; .00082 G/100ML; G/100ML; G/100ML; G/100ML; G/100ML; G/100ML; G/100ML
75 INJECTION, SOLUTION INTRAVENOUS CONTINUOUS
Status: DISCONTINUED | OUTPATIENT
Start: 2025-03-14 | End: 2025-03-15

## 2025-03-14 RX ORDER — AMLODIPINE BESYLATE 10 MG/1
10 TABLET ORAL DAILY
Status: DISCONTINUED | OUTPATIENT
Start: 2025-03-15 | End: 2025-03-16 | Stop reason: HOSPADM

## 2025-03-14 RX ADMIN — SODIUM CHLORIDE, SODIUM GLUCONATE, SODIUM ACETATE, POTASSIUM CHLORIDE, MAGNESIUM CHLORIDE, SODIUM PHOSPHATE, DIBASIC, AND POTASSIUM PHOSPHATE 75 ML/HR: .53; .5; .37; .037; .03; .012; .00082 INJECTION, SOLUTION INTRAVENOUS at 21:30

## 2025-03-14 RX ADMIN — CLONIDINE HYDROCHLORIDE 0.1 MG: 0.1 TABLET ORAL at 21:41

## 2025-03-14 RX ADMIN — GABAPENTIN 300 MG: 300 CAPSULE ORAL at 21:41

## 2025-03-14 RX ADMIN — KETOROLAC TROMETHAMINE 15 MG: 30 INJECTION, SOLUTION INTRAMUSCULAR at 18:41

## 2025-03-14 RX ADMIN — DIAZEPAM 5 MG: 5 INJECTION INTRAMUSCULAR; INTRAVENOUS at 18:40

## 2025-03-14 RX ADMIN — LIDOCAINE 1 PATCH: 700 PATCH TOPICAL at 18:40

## 2025-03-14 RX ADMIN — PHENOBARBITAL SODIUM 130 MG: 130 INJECTION INTRAMUSCULAR; INTRAVENOUS at 23:00

## 2025-03-14 RX ADMIN — PHENOBARBITAL SODIUM 130 MG: 130 INJECTION INTRAMUSCULAR; INTRAVENOUS at 21:41

## 2025-03-14 NOTE — ED NOTES
Pt states that he is feeling very uncomfortable like his body is seizing up.  MD messaged and is aware     Kassie Rojo RN  03/14/25 [id number]

## 2025-03-14 NOTE — ED NOTES
Attached patient from [facility] will be going to [facility].   Accepting is Dr Cortez.    will be 2000 with [facility].   Report is [phone]      Jennifer Lew RN  03/14/25 3452

## 2025-03-14 NOTE — ED ATTENDING ATTESTATION
"I, Ceferino Eaton MD, saw and evaluated the patient. I have discussed the patient with the resident and agree with the resident's findings, Plan of Care, and MDM as documented in the resident's note, except where noted. All available labs and Radiology studies were reviewed.  I was present for key portions of any procedure(s) performed by the resident and I was immediately available to provide assistance.    At this point I agree with the current assessment done in the Emergency Department.  I have conducted an independent evaluation of this patient a history and physical is as follows:    69 yo male with a history of HTN, bipolar disorder, DM, atrial fibrillation, opioid use disorder on methadone maintenance therapy, anxiety, major depressive disorder, and alcoholism presents to the ED requesting detox from alcohol and methadone. The patient says he typically drinks about 1/2 of a fifth of whiskey per day. Last alcohol intake this afternoon. He skipped his methadone dose today. He says he feels \"ok\" at present --> no tremors or significant anxiety. (+) Secondary complaint of occasional SI for the past several days. The patient reports multiple recent life stressors. He has no plan to hurt himself and does not currently feel suicidal. No HI or hallucinations. He denies chest pain, shortness of breath, nausea, vomiting, and diaphoresis. No history of alcohol withdrawal seizures. No other specific complaints.    ROS: per resident physician note    Gen: NAD, AA&Ox3, (+) mildly intoxicated  HEENT: PERRL, EOMI, (+) pinpoint pupils, (+) nystagmus  Neck: supple  CV: RRR  Lungs: CTA B/L  Abdomen: soft, NT/ND  Ext: no swelling or deformity  Neuro: 5/5 strength all extremities, sensation grossly intact, (+) slurred speech  Skin: no rash    ED Course  The patient is mildly intoxicated but otherwise well appearing with stable vital signs and a benign physical examination. No signs of acute withdrawal at this time. He is " adamantly requesting detox from alcohol and opioids. (+) Occasional SI x several days but none at present and no plan. Case discussed with the Medical Withdrawal Unit --> will check EKG, basic labs, ethanol level, and UDS. Will continue to monitor in the ED. Likely plan for transfer to the [facility] Medical Withdrawal Unit for further management.      Critical Care Time  Procedures

## 2025-03-14 NOTE — ASSESSMENT & PLAN NOTE
Reportedly on 82 mg of methadone daily. Last dose yesterday. Patient desires to be weaned off of this.  Unable to confirm methadone use in PDMP. Please verify if able.

## 2025-03-14 NOTE — ASSESSMENT & PLAN NOTE
"Lab Results   Component Value Date    HGBA1C 6.6 (H) 09/15/2021       No results for input(s): \"POCGLU\" in the last 72 hours.    Blood Sugar Average: Last 72 hrs:    Home agents held.  Sliding scale and hypoglycemia protocol.  "

## 2025-03-14 NOTE — ED NOTES
Pt made aware went medically cleared he will have to change into paper scrubs and take off sneakers     Kassie Rojo, PROSPER  03/14/25 1824

## 2025-03-14 NOTE — ED PROVIDER NOTES
"  ED Disposition       None          Assessment & Plan   {Hyperlinks  Risk Stratification - NIHSS - HEART SCORE - Fill out sepsis note and make sure you call [id number] if severe or septic shock:[id number]}    Medical Decision Making  Patient is a 70 y.o. male with PMH of *** who presents to the ED with ***    On presentation ***  History and physical exam most consistent with *** However, differential diagnosis included but not limited to *** Plan ***    View ED course above for further discussion on patient workup.     ***    All labs reviewed and utilized in the medical decision making process  All radiology studies independently viewed by me and interpreted by the radiologist.  I reviewed all testing with the patient.     Upon re-evaluation ***      Amount and/or Complexity of Data Reviewed  Labs: ordered.             Medications - No data to display    ED Risk Strat Scores                                                History of Present Illness   {Hyperlinks  History (Med, Surg, Fam, Social) - Current Medications - Allergies  :[id number]}    Chief Complaint   Patient presents with    Detox Evaluation     Pt states he wants to do detox and rehab for his alcohol and methadone use.     Psychiatric Evaluation     Pt also states that he is suicidal and homicidal at this time - pt does not have a current plan but in the past (in the 70's) has had suicide attempts - pt states \" I would take pills or somethin')       Past Medical History:   Diagnosis Date    Anxiety     Depression     Diabetes mellitus (HCC)     Hypertension     PTSD (post-traumatic stress disorder)       Past Surgical History:   Procedure Laterality Date    BACK SURGERY      back fusion      History reviewed. No pertinent family history.   Social History     Tobacco Use    Smoking status: Former    Smokeless tobacco: Never   Vaping Use    Vaping status: Never Used   Substance Use Topics    Alcohol use: Yes    Drug use: No      E-Cigarette/Vaping    " E-Cigarette Use Never User       E-Cigarette/Vaping Substances    Nicotine No     THC No     CBD No     Flavoring No     Other No     Unknown No       I have reviewed and agree with the history as documented.     Patient is a 70-year-old male with past medical history of methadone use, alcohol abuse, hepatitis status posttreatment, who is presenting to the emergency department seeking medical detox, he would like to come off of both methadone and alcohol, states his alcohol use pattern is drinking one half of 1/5 of whiskey daily, he also takes methadone, last time he took methadone was yesterday, states he is experiencing suicidal ideation without a specific plan, states that in the setting of attempting to seek detox he is experiencing relational difficulties, states he has broken up with his girlfriend, states he has no plan for suicide, no homicidal ideation, no history of prior attempts, states he does have a history of alcohol withdrawal, but has never required intubation, never required medical hospitalization, has attempted detox before, has performed outpatient with [facility] in the past, is denying headache, denying abdominal pain no nausea or vomiting, denies chest pain or difficulty breathing, states he feels mildly anxious at this time, is not feeling tremulous        Review of Systems        Objective   {Hyperlinks  Historical Vitals - Historical Labs - Chart Review/Microbiology - Last Echo - Code Status  :[id number]}    ED Triage Vitals [03/14/25 1417]   Temperature Pulse Blood Pressure Respirations SpO2 Patient Position - Orthostatic VS   98 °F (36.7 °C) 98 (!) 160/108 18 100 % Sitting      Temp Source Heart Rate Source BP Location FiO2 (%) Pain Score    Oral Monitor Right arm -- No Pain      Vitals      Date and Time Temp Pulse SpO2 Resp BP Pain Score FACES Pain Rating User   03/14/25 1417 98 °F (36.7 °C) 98 100 % 18 160/108 No Pain -- JS            Physical Exam  Vitals and nursing note  reviewed.   Constitutional:       General: He is not in acute distress.     Appearance: He is well-developed. He is ill-appearing. He is not toxic-appearing.      Comments: Patient is intoxicated appearing, mild slurring of speech, mild conjunctival injection   HENT:      Head: Normocephalic and atraumatic.      Nose: No congestion or rhinorrhea.      Mouth/Throat:      Mouth: Mucous membranes are moist.      Pharynx: Oropharynx is clear. No oropharyngeal exudate or posterior oropharyngeal erythema.   Eyes:      Conjunctiva/sclera: Conjunctivae normal.      Comments: Pupils are pinpoint, some mild nystagmus at rest   Cardiovascular:      Rate and Rhythm: Normal rate and regular rhythm.      Heart sounds: No murmur heard.  Pulmonary:      Effort: Pulmonary effort is normal. No respiratory distress.      Breath sounds: Normal breath sounds. No wheezing, rhonchi or rales.   Abdominal:      Palpations: Abdomen is soft.      Tenderness: There is no abdominal tenderness. There is no right CVA tenderness, left CVA tenderness, guarding or rebound.   Musculoskeletal:         General: No swelling.      Cervical back: Neck supple.   Skin:     General: Skin is warm and dry.      Capillary Refill: Capillary refill takes less than 2 seconds.   Neurological:      General: No focal deficit present.      Mental Status: He is alert and oriented to person, place, and time.      Motor: No weakness.   Psychiatric:         Mood and Affect: Mood normal.         Results Reviewed       None            No orders to display       Procedures    ED Medication and Procedure Management   Prior to Admission Medications   Prescriptions Last Dose Informant Patient Reported? Taking?   amLODIPine (NORVASC) 10 mg tablet   No No   Sig: Take 1 tablet (10 mg total) by mouth daily   gabapentin (NEURONTIN) 300 mg capsule   No No   Sig: Take 1 capsule (300 mg total) by mouth 3 (three) times a day for 10 days   lisinopril (ZESTRIL) 20 mg tablet  Self Yes No    Sig: Take 20 mg by mouth daily   lurasidone 60 MG TABS   No No   Sig: Take 1 tablet (60 mg total) by mouth daily with dinner   traMADol (ULTRAM) 50 mg tablet  Self Yes No   Sig: Take 50 mg by mouth every 8 (eight) hours as needed for moderate pain   Patient not taking: Reported on 9/7/2022   traZODone (DESYREL) 50 mg tablet   No No   Sig: Take 1 tablet (50 mg total) by mouth daily at bedtime as needed for sleep      Facility-Administered Medications: None     Patient's Medications   Discharge Prescriptions    No medications on file     No discharge procedures on file.  ED SEPSIS DOCUMENTATION          meters)    Stage 3A Moderate CKD (GFR = 45-59 mL/min/1.73 square meters)    Stage 3B Moderate CKD (GFR = 30-44 mL/min/1.73 square meters)    Stage 4 Severe CKD (GFR = 15-29 mL/min/1.73 square meters)    Stage 5 End Stage CKD (GFR <15 mL/min/1.73 square meters)  Note: GFR calculation is accurate only with a steady state creatinine    CBC and differential [[id number]]  (Abnormal) Collected: 03/14/25 1533    Lab Status: Final result Specimen: Blood from Arm, Right Updated: 03/14/25 1611     WBC 5.07 Thousand/uL      RBC 3.90 Million/uL      Hemoglobin 11.3 g/dL      Hematocrit 34.3 %      MCV 88 fL      MCH 29.0 pg      MCHC 32.9 g/dL      RDW 14.6 %      MPV 9.4 fL      Platelets 172 Thousands/uL      nRBC 0 /100 WBCs      Segmented % 53 %      Immature Grans % 0 %      Lymphocytes % 30 %      Monocytes % 14 %      Eosinophils Relative 3 %      Basophils Relative 0 %      Absolute Neutrophils 2.66 Thousands/µL      Absolute Immature Grans 0.02 Thousand/uL      Absolute Lymphocytes 1.50 Thousands/µL      Absolute Monocytes 0.70 Thousand/µL      Eosinophils Absolute 0.17 Thousand/µL      Basophils Absolute 0.02 Thousands/µL             No orders to display       Procedures    ED Medication and Procedure Management   Prior to Admission Medications   Prescriptions Last Dose Informant Patient Reported? Taking?   amLODIPine (NORVASC) 10 mg tablet   No No   Sig: Take 1 tablet (10 mg total) by mouth daily   gabapentin (NEURONTIN) 300 mg capsule   No No   Sig: Take 1 capsule (300 mg total) by mouth 3 (three) times a day for 10 days   lisinopril (ZESTRIL) 20 mg tablet  Self Yes No   Sig: Take 20 mg by mouth daily   lurasidone 60 MG TABS   No No   Sig: Take 1 tablet (60 mg total) by mouth daily with dinner   methocarbamol (ROBAXIN) 750 mg tablet   Yes No   Sig: Take 750 mg by mouth Three times daily as needed   rosuvastatin (CRESTOR) 10 MG tablet   Yes No   Sig: Take 10 mg by mouth daily   Patient not taking: Reported on  3/14/2025   traZODone (DESYREL) 50 mg tablet   No No   Sig: Take 1 tablet (50 mg total) by mouth daily at bedtime as needed for sleep      Facility-Administered Medications: None     Discharge Medication List as of 3/14/2025  8:27 PM        CONTINUE these medications which have NOT CHANGED    Details   amLODIPine (NORVASC) 10 mg tablet Take 1 tablet (10 mg total) by mouth daily, Starting Wed 9/22/2021, Until Fri 10/22/2021, Normal      gabapentin (NEURONTIN) 300 mg capsule Take 1 capsule (300 mg total) by mouth 3 (three) times a day for 10 days, Starting Tue 9/21/2021, Until Fri 10/1/2021, Normal      lisinopril (ZESTRIL) 20 mg tablet Take 20 mg by mouth daily, Historical Med      lurasidone 60 MG TABS Take 1 tablet (60 mg total) by mouth daily with dinner, Starting Tue 9/21/2021, Until Thu 10/21/2021, Normal      methocarbamol (ROBAXIN) 750 mg tablet Take 750 mg by mouth Three times daily as needed, Starting Mon 3/10/2025, Historical Med      rosuvastatin (CRESTOR) 10 MG tablet Take 10 mg by mouth daily, Starting Tue 3/26/2024, Until Wed 3/26/2025, Historical Med      traZODone (DESYREL) 50 mg tablet Take 1 tablet (50 mg total) by mouth daily at bedtime as needed for sleep, Starting Tue 9/21/2021, Until Thu 10/21/2021 at 2359, Normal      traMADol (ULTRAM) 50 mg tablet Take 50 mg by mouth every 8 (eight) hours as needed for moderate pain, Historical Med           No discharge procedures on file.  ED SEPSIS DOCUMENTATION   Time reflects when diagnosis was documented in both MDM as applicable and the Disposition within this note       Time User Action Codes Description Comment    3/14/2025  6:38 PM Surendra Hardy [F10.10] Alcohol abuse     3/14/2025  6:39 PM Surendra Hardy [F11.20] Methadone dependence (HCC)                  Simon Kang DO  03/23/25 6282

## 2025-03-14 NOTE — EMTALA/ACUTE CARE TRANSFER
[facility] EMERGENCY DEPARTMENT  [address] [address]  Dept: [phone]      EMTALA TRANSFER CONSENT    NAME Christian Ball                                         1955                              MRN [id number]    I have been informed of my rights regarding examination, treatment, and transfer   by Dr. Ceferino Eaton MD    Benefits: Specialized equipment and/or services available at the receiving facility (Include comment)________________________    Risks: Potential for delay in receiving treatment, Potential deterioration of medical condition, Loss of IV, Increased discomfort during transfer, Possible worsening of condition or death during transfer      Consent for Transfer:  I acknowledge that my medical condition has been evaluated and explained to me by the emergency department physician or other qualified medical person and/or my attending physician, who has recommended that I be transferred to the service of  Accepting Physician: Dr Cortez at Accepting Facility Name, City & State : [place], [place]. The above potential benefits of such transfer, the potential risks associated with such transfer, and the probable risks of not being transferred have been explained to me, and I fully understand them.  The doctor has explained that, in my case, the benefits of transfer outweigh the risks.  I agree to be transferred.    I authorize the performance of emergency medical procedures and treatments upon me in both transit and upon arrival at the receiving facility.  Additionally, I authorize the release of any and all medical records to the receiving facility and request they be transported with me, if possible.  I understand that the safest mode of transportation during a medical emergency is an ambulance and that the Hospital advocates the use of this mode of transport. Risks of traveling to the receiving facility by car, including absence of  medical control, life sustaining equipment, such as oxygen, and medical personnel has been explained to me and I fully understand them.    (JOSE M CORRECT BOX BELOW)  [  ]  I consent to the stated transfer and to be transported by ambulance/helicopter.  [  ]  I consent to the stated transfer, but refuse transportation by ambulance and accept full responsibility for my transportation by car.  I understand the risks of non-ambulance transfers and I exonerate the Hospital and its staff from any deterioration in my condition that results from this refusal.    X___________________________________________    DATE  25  TIME________  Signature of patient or legally responsible individual signing on patient behalf           RELATIONSHIP TO PATIENT_________________________          Provider Certification    NAME Christian Ball                                         1955                              MRN [id number]    A medical screening exam was performed on the above named patient.  Based on the examination:    Condition Necessitating Transfer The primary encounter diagnosis was Alcohol abuse. A diagnosis of Methadone dependence (HCC) was also pertinent to this visit.    Patient Condition: The patient has been stabilized such that within reasonable medical probability, no material deterioration of the patient condition or the condition of the unborn child(ann marie) is likely to result from the transfer    Reason for Transfer: Level of Care needed not available at this facility    Transfer Requirements: Facility [place], [place]   Space available and qualified personnel available for treatment as acknowledged by    Agreed to accept transfer and to provide appropriate medical treatment as acknowledged by       Dr Cortez  Appropriate medical records of the examination and treatment of the patient are provided at the time of transfer   STAFF INITIAL WHEN COMPLETED _______  Transfer will be performed by  qualified personnel from    and appropriate transfer equipment as required, including the use of necessary and appropriate life support measures.    Provider Certification: I have examined the patient and explained the following risks and benefits of being transferred/refusing transfer to the patient/family:  General risk, such as traffic hazards, adverse weather conditions, rough terrain or turbulence, possible failure of equipment (including vehicle or aircraft), or consequences of actions of persons outside the control of the transport personnel, Unanticipated needs of medical equipment and personnel during transport, Risk of worsening condition, The possibility of a transport vehicle being unavailable      Based on these reasonable risks and benefits to the patient and/or the unborn child(ann marie), and based upon the information available at the time of the patient’s examination, I certify that the medical benefits reasonably to be expected from the provision of appropriate medical treatments at another medical facility outweigh the increasing risks, if any, to the individual’s medical condition, and in the case of labor to the unborn child, from effecting the transfer.    X____________________________________________ DATE 03/14/25        TIME_______      ORIGINAL - SEND TO MEDICAL RECORDS   COPY - SEND WITH PATIENT DURING TRANSFER

## 2025-03-14 NOTE — ASSESSMENT & PLAN NOTE
Patient with hx of heavy alcohol use, intoxicated on admission, ethanol level 189  Reports drinking 16 oz vodka daily.  Last drink 3/13 afternoon  Symptoms on admission of withdrawal include anxiety.   Toxicology consulted. CRS consulted.  Initiate IVF, thiamine, folic acid, MVI.  Phenobarbital loading dose 130 mg.  SEWS protocol.

## 2025-03-15 PROBLEM — F10.20 ALCOHOL USE DISORDER, SEVERE, DEPENDENCE (HCC): Status: ACTIVE | Noted: 2025-03-15

## 2025-03-15 PROBLEM — F11.20 OPIOID USE DISORDER, SEVERE, ON MAINTENANCE THERAPY, DEPENDENCE (HCC): Status: ACTIVE | Noted: 2025-03-15

## 2025-03-15 LAB
ANION GAP SERPL CALCULATED.3IONS-SCNC: 12 MMOL/L (ref 4–13)
BUN SERPL-MCNC: 11 MG/DL (ref 5–25)
CALCIUM SERPL-MCNC: 8.9 MG/DL (ref 8.4–10.2)
CHLORIDE SERPL-SCNC: 98 MMOL/L (ref 96–108)
CO2 SERPL-SCNC: 26 MMOL/L (ref 21–32)
CREAT SERPL-MCNC: 0.67 MG/DL (ref 0.6–1.3)
ERYTHROCYTE [DISTWIDTH] IN BLOOD BY AUTOMATED COUNT: 14.4 % (ref 11.6–15.1)
EST. AVERAGE GLUCOSE BLD GHB EST-MCNC: 117 MG/DL
GFR SERPL CREATININE-BSD FRML MDRD: 95 ML/MIN/1.73SQ M
GLUCOSE SERPL-MCNC: 123 MG/DL (ref 65–140)
GLUCOSE SERPL-MCNC: 151 MG/DL (ref 65–140)
GLUCOSE SERPL-MCNC: 74 MG/DL (ref 65–140)
GLUCOSE SERPL-MCNC: 79 MG/DL (ref 65–140)
GLUCOSE SERPL-MCNC: 92 MG/DL (ref 65–140)
HBA1C MFR BLD: 5.7 %
HCT VFR BLD AUTO: 41.8 % (ref 36.5–49.3)
HGB BLD-MCNC: 13.7 G/DL (ref 12–17)
MCH RBC QN AUTO: 29.3 PG (ref 26.8–34.3)
MCHC RBC AUTO-ENTMCNC: 32.8 G/DL (ref 31.4–37.4)
MCV RBC AUTO: 89 FL (ref 82–98)
PLATELET # BLD AUTO: 188 THOUSANDS/UL (ref 149–390)
PMV BLD AUTO: 9.5 FL (ref 8.9–12.7)
POTASSIUM SERPL-SCNC: 4.6 MMOL/L (ref 3.5–5.3)
RBC # BLD AUTO: 4.68 MILLION/UL (ref 3.88–5.62)
SODIUM SERPL-SCNC: 136 MMOL/L (ref 135–147)
WBC # BLD AUTO: 4.62 THOUSAND/UL (ref 4.31–10.16)

## 2025-03-15 PROCEDURE — 85027 COMPLETE CBC AUTOMATED: CPT

## 2025-03-15 PROCEDURE — 99232 SBSQ HOSP IP/OBS MODERATE 35: CPT

## 2025-03-15 PROCEDURE — 82948 REAGENT STRIP/BLOOD GLUCOSE: CPT

## 2025-03-15 PROCEDURE — 80048 BASIC METABOLIC PNL TOTAL CA: CPT

## 2025-03-15 PROCEDURE — 99223 1ST HOSP IP/OBS HIGH 75: CPT | Performed by: EMERGENCY MEDICINE

## 2025-03-15 RX ORDER — PHENOBARBITAL SODIUM 130 MG/ML
130 INJECTION, SOLUTION INTRAMUSCULAR; INTRAVENOUS ONCE
Status: COMPLETED | OUTPATIENT
Start: 2025-03-15 | End: 2025-03-15

## 2025-03-15 RX ORDER — IBUPROFEN 400 MG/1
400 TABLET, FILM COATED ORAL EVERY 6 HOURS PRN
Status: DISCONTINUED | OUTPATIENT
Start: 2025-03-15 | End: 2025-03-16 | Stop reason: HOSPADM

## 2025-03-15 RX ORDER — PHENOBARBITAL SODIUM 130 MG/ML
260 INJECTION, SOLUTION INTRAMUSCULAR; INTRAVENOUS ONCE
Status: COMPLETED | OUTPATIENT
Start: 2025-03-15 | End: 2025-03-15

## 2025-03-15 RX ORDER — METHADONE HYDROCHLORIDE 10 MG/ML
80 CONCENTRATE ORAL DAILY
Refills: 0 | Status: DISCONTINUED | OUTPATIENT
Start: 2025-03-15 | End: 2025-03-16 | Stop reason: HOSPADM

## 2025-03-15 RX ORDER — PHENOBARBITAL 32.4 MG/1
64.8 TABLET ORAL ONCE
Status: COMPLETED | OUTPATIENT
Start: 2025-03-15 | End: 2025-03-15

## 2025-03-15 RX ADMIN — ACETAMINOPHEN 650 MG: 325 TABLET, FILM COATED ORAL at 03:28

## 2025-03-15 RX ADMIN — IBUPROFEN 400 MG: 400 TABLET, FILM COATED ORAL at 11:58

## 2025-03-15 RX ADMIN — PHENOBARBITAL 64.8 MG: 32.4 TABLET ORAL at 08:07

## 2025-03-15 RX ADMIN — ENOXAPARIN SODIUM 40 MG: 100 INJECTION SUBCUTANEOUS at 08:08

## 2025-03-15 RX ADMIN — PHENOBARBITAL SODIUM 130 MG: 130 INJECTION INTRAMUSCULAR; INTRAVENOUS at 06:23

## 2025-03-15 RX ADMIN — Medication 1 TABLET: at 08:08

## 2025-03-15 RX ADMIN — AMLODIPINE BESYLATE 10 MG: 10 TABLET ORAL at 08:08

## 2025-03-15 RX ADMIN — CLONIDINE HYDROCHLORIDE 0.1 MG: 0.1 TABLET ORAL at 16:22

## 2025-03-15 RX ADMIN — METHADONE HYDROCHLORIDE 80 MG: 10 CONCENTRATE ORAL at 09:57

## 2025-03-15 RX ADMIN — PHENOBARBITAL SODIUM 260 MG: 130 INJECTION INTRAMUSCULAR; INTRAVENOUS at 18:06

## 2025-03-15 RX ADMIN — METHOCARBAMOL 750 MG: 750 TABLET, FILM COATED ORAL at 12:01

## 2025-03-15 RX ADMIN — METHOCARBAMOL 750 MG: 750 TABLET, FILM COATED ORAL at 03:29

## 2025-03-15 RX ADMIN — INSULIN LISPRO 1 UNITS: 100 INJECTION, SOLUTION INTRAVENOUS; SUBCUTANEOUS at 22:16

## 2025-03-15 RX ADMIN — FOLIC ACID 1 MG: 1 TABLET ORAL at 08:08

## 2025-03-15 RX ADMIN — LISINOPRIL 20 MG: 20 TABLET ORAL at 08:07

## 2025-03-15 RX ADMIN — SODIUM CHLORIDE, SODIUM GLUCONATE, SODIUM ACETATE, POTASSIUM CHLORIDE, MAGNESIUM CHLORIDE, SODIUM PHOSPHATE, DIBASIC, AND POTASSIUM PHOSPHATE 75 ML/HR: .53; .5; .37; .037; .03; .012; .00082 INJECTION, SOLUTION INTRAVENOUS at 11:34

## 2025-03-15 RX ADMIN — THIAMINE HCL TAB 100 MG 100 MG: 100 TAB at 08:07

## 2025-03-15 NOTE — ASSESSMENT & PLAN NOTE
Lab Results   Component Value Date    HGBA1C 5.7 (H) 03/14/2025       Recent Labs     03/14/25  2303 03/15/25  0636 03/15/25  1048   POCGLU 89 79 123       Blood Sugar Average: Last 72 hrs:  (P) 97  Home agents held.  Sliding scale and hypoglycemia protocol.

## 2025-03-15 NOTE — ASSESSMENT & PLAN NOTE
Patient reports suicidal ideation.  Denies a plan.  Denies homicidal ideation.  Reports a history of suicide attempts in the 1970s.  Patient has a history of bipolar 1 disorder per chart review.  Does not take any medications for this but was previously prescribed Latuda.  Psych consult

## 2025-03-15 NOTE — ASSESSMENT & PLAN NOTE
Pt with a h/o of daily alcohol use   Drinks 16 ounces of vodka daily  No known h/o withdrawal seizures or previous withdrawal complications  Is not a naltrexone candidate  Withdrawal management as above  Initiate IVFs, p.o. thiamine, folic acid, and MVI  Consult to CRS   Consult case management/CRS for assistance with aftercare resources - pt interested in home resources at this time

## 2025-03-15 NOTE — ASSESSMENT & PLAN NOTE
The patient went almost 2 years without alcohol use and until suffering recurrence of use only 2 weeks ago. Therefore, he is not really at much risk for the development of significant withdrawal.   He c/o mostly opioid withdrawal symptoms since he has not had his dose in 2 days. His dose was not verified upon admission, nor ordered. I called the after hours line and verified his dose.   He has received a total of 455mg of phenobarbital thus far. I will d/c SEWS now. He I stable from a med tox standpoint for dispo per the primary team. Med tox will sign off and remain available as needed.

## 2025-03-15 NOTE — ASSESSMENT & PLAN NOTE
Lab Results   Component Value Date    HGBA1C 6.6 (H) 09/15/2021       Recent Labs     03/14/25  2303 03/15/25  0636   POCGLU 89 79       Blood Sugar Average: Last 72 hrs:  (P) 84

## 2025-03-15 NOTE — H&P
"H&P - Hospitalist   Name: Christian Ball 70 y.o. male I MRN: [id number]  Unit/Bed#: 5T DETOX 504-01 I Date of Admission: 3/14/2025   Date of Service: 3/14/2025 I Hospital Day: 0     Assessment & Plan  Alcohol abuse  Patient with hx of heavy alcohol use, intoxicated on admission, ethanol level 189  Reports drinking 16 oz vodka daily.  Last drink 3/13 afternoon  Symptoms on admission of withdrawal include anxiety.   Toxicology consulted. CRS consulted.  Initiate IVF, thiamine, folic acid, MVI.  Phenobarbital loading dose 130 mg.  SEWS protocol.   Suicidal ideation  Patient reports suicidal ideation.  Denies a plan.  Denies homicidal ideation.  Reports a history of suicide attempts in the 1970s.  Patient has a history of bipolar 1 disorder per chart review.  Does not take any medications for this but was previously prescribed Latuda.  Psych consult.  Chronic low back pain  Continue methocarbamol as needed.  Benign essential hypertension  Continue amlodipine and lisinopril.  Hx of opioid abuse (HCC)  Reportedly on 82 mg of methadone daily. Last dose yesterday. Patient desires to be weaned off of this.  Unable to confirm methadone use in PDMP. Please verify if able.  Type 2 diabetes mellitus with ophthalmic complication, without long-term current use of insulin ([facility])  Lab Results   Component Value Date    HGBA1C 6.6 (H) 09/15/2021       No results for input(s): \"POCGLU\" in the last 72 hours.    Blood Sugar Average: Last 72 hrs:    Home agents held.  Sliding scale and hypoglycemia protocol.      VTE Pharmacologic Prophylaxis: VTE Score: 3 Moderate Risk (Score 3-4) - Pharmacological DVT Prophylaxis Ordered: enoxaparin (Lovenox).  Code Status: Level 1 - Full Code   Discussion with family: Patient declined call to .     Anticipated Length of Stay: Patient will be admitted on an inpatient basis with an anticipated length of stay of greater than 2 midnights secondary to detox.    History of Present Illness "   Chief Complaint: Seeking detoxification    Christian Ball is a 70 y.o. male with a PMH of alcohol abuse, hepatitis status posttreatment, methadone use, bipolar 1 disorder, hypertension who presents to [facility] seeking medical detox.  He states he consumes up to 16 oz of whiskey daily.  He also reports current methadone use with his last dose being yesterday.  He states he wants to come off both alcohol and methadone.  He admits anxiety.  He denies tremors.  He denies a history of seizures.  He reports suicidal ideation.  He denies a plan at this time.  He denies homicidal ideation.  He states he has gone through alcohol withdrawal and has previously sought detoxification at [facility].  He denies CP, SOB, N/V/D, fever, chills.      Review of Systems   Constitutional:  Negative for chills and fever.   HENT:  Negative for ear pain and sore throat.    Eyes:  Negative for pain and visual disturbance.   Respiratory:  Negative for cough and shortness of breath.    Cardiovascular:  Negative for chest pain and palpitations.   Gastrointestinal:  Negative for abdominal pain and vomiting.   Genitourinary:  Negative for dysuria and hematuria.   Musculoskeletal:  Negative for arthralgias and back pain.   Skin:  Negative for color change and rash.   Neurological:  Negative for seizures and syncope.   Psychiatric/Behavioral:  Positive for suicidal ideas. The patient is nervous/anxious.    All other systems reviewed and are negative.      Historical Information   Past Medical History:   Diagnosis Date    Anxiety     Depression     Diabetes mellitus (HCC)     Hypertension     PTSD (post-traumatic stress disorder)      Past Surgical History:   Procedure Laterality Date    BACK SURGERY      back fusion     Social History     Tobacco Use    Smoking status: Former    Smokeless tobacco: Never   Vaping Use    Vaping status: Never Used   Substance and Sexual Activity    Alcohol use: Yes    Drug use: No    Sexual  activity: Not Currently     E-Cigarette/Vaping    E-Cigarette Use Never User      E-Cigarette/Vaping Substances    Nicotine No     THC No     CBD No     Flavoring No     Other No     Unknown No      No family history on file.  Social History:  Marital Status:    Occupation: Retired  Patient Pre-hospital Living Situation: Home  Patient Pre-hospital Level of Mobility: walks  Patient Pre-hospital Diet Restrictions: Low-carb    Meds/Allergies   I have reviewed home medications with patient personally.  Prior to Admission medications    Medication Sig Start Date End Date Taking? Authorizing Provider   lisinopril (ZESTRIL) 20 mg tablet Take 20 mg by mouth daily   Yes Historical Provider, MD   methocarbamol (ROBAXIN) 750 mg tablet Take 750 mg by mouth Three times daily as needed 3/10/25  Yes Historical Provider, MD   amLODIPine (NORVASC) 10 mg tablet Take 1 tablet (10 mg total) by mouth daily 9/22/21 10/22/21  Ayana Mendez MD   gabapentin (NEURONTIN) 300 mg capsule Take 1 capsule (300 mg total) by mouth 3 (three) times a day for 10 days 9/21/21 10/1/21  Ayana Mendez MD   lurasidone 60 MG TABS Take 1 tablet (60 mg total) by mouth daily with dinner 9/21/21 10/21/21  Ayana Mendez MD   rosuvastatin (CRESTOR) 10 MG tablet Take 10 mg by mouth daily  Patient not taking: Reported on 3/14/2025 3/26/24 3/26/25  Historical Provider, MD   traMADol (ULTRAM) 50 mg tablet Take 50 mg by mouth every 8 (eight) hours as needed for moderate pain  Patient not taking: Reported on 9/7/2022    Historical Provider, MD   traZODone (DESYREL) 50 mg tablet Take 1 tablet (50 mg total) by mouth daily at bedtime as needed for sleep 9/21/21 10/21/21  Ayana Mendez MD     No Known Allergies    Objective :  Temp:  [97.3 °F (36.3 °C)-98 °F (36.7 °C)] 97.3 °F (36.3 °C)  HR:  [82-98] 92  BP: (135-160)/() 157/97  Resp:  [18] 18  SpO2:  [95 %-100 %] 95 %  O2 Device: None (Room air)    Physical Exam  Vitals and nursing note reviewed.    Constitutional:       General: He is not in acute distress.     Appearance: He is well-developed.   HENT:      Head: Normocephalic and atraumatic.   Eyes:      Extraocular Movements: Extraocular movements intact.      Conjunctiva/sclera: Conjunctivae normal.   Pulmonary:      Effort: Pulmonary effort is normal. No respiratory distress.   Musculoskeletal:         General: No swelling or deformity. Normal range of motion.      Cervical back: Normal range of motion.   Skin:     General: Skin is dry.   Neurological:      General: No focal deficit present.      Mental Status: He is alert and oriented to person, place, and time.   Psychiatric:         Mood and Affect: Mood normal.         Thought Content: Thought content normal.          Lines/Drains:            Lab Results: I have reviewed the following results:  Results from last 7 days   Lab Units 03/14/25  1533   WBC Thousand/uL 5.07   HEMOGLOBIN g/dL 11.3*   HEMATOCRIT % 34.3*   PLATELETS Thousands/uL 172   SEGS PCT % 53   LYMPHO PCT % 30   MONO PCT % 14*   EOS PCT % 3     Results from last 7 days   Lab Units 03/14/25  1533   SODIUM mmol/L 139   POTASSIUM mmol/L 3.6   CHLORIDE mmol/L 101   CO2 mmol/L 28   BUN mg/dL 15   CREATININE mg/dL 0.67   ANION GAP mmol/L 10   CALCIUM mg/dL 8.6   ALBUMIN g/dL 4.0   TOTAL BILIRUBIN mg/dL 0.42   ALK PHOS U/L 52   ALT U/L 21   AST U/L 24   GLUCOSE RANDOM mg/dL 92             Lab Results   Component Value Date    HGBA1C 6.6 (H) 09/15/2021    HGBA1C 7.1 (H) 02/25/2021    HGBA1C 6.4 (H) 08/31/2020                 Administrative Statements   I have spent a total time of 79 minutes in caring for this patient on the day of the visit/encounter including Diagnostic results, Risks and benefits of tx options, Patient and family education, Importance of tx compliance, Documenting in the medical record, Reviewing/placing orders in the medical record (including tests, medications, and/or procedures), Obtaining or reviewing history  , and  Communicating with other healthcare professionals .    ** Please Note: This note has been constructed using a voice recognition system. **

## 2025-03-15 NOTE — ASSESSMENT & PLAN NOTE
Reportedly on 82 mg of methadone daily. Last dose yesterday. Patient desires to be weaned off of this.  Confirmed by toxicology who called methadone clinic as patient was off methadone he was having  Mild withdrawal symptoms, will continue with methadone and monitor response

## 2025-03-15 NOTE — PROGRESS NOTES
03/15/25 1823   Referral Data   Referral Source Patient  (via EMS)   Referral Name Pt initially presented to [facility] ED   Referral Reason Drug/Alcohol Abuse   County Information   County of Residence [place]   Patient Information   Mental Status Alert   Primary Caregiver Self   Accompanied by/Relationship n/a   Support System Immediate family;Friends;Other (Comment)  (family in is [place]; has counselor at his Methadone clinic)   Religion/Cultural Requests Shinto   Legal Information   Current Status:   (TBD; pending psych consult)   Legal Issues Pt denies any current or past legal issues   Health Care Proxy Appointed No   Activities of Daily Living Prior to Admission   Functional Status Independent   Assistive Device No device needed   Living Arrangement Apartment;Lives alone   Ambulation Independent   Access to Firearms   Access to Firearms No  (pt denies any access to firearms)   Income Information   Income Source Pension/[employer]   Means of Transportation   Means of Transport to Appts: Public Transportation - Bus

## 2025-03-15 NOTE — PLAN OF CARE
Problem: SUBSTANCE USE/ABUSE  Goal: By discharge, will develop insight into their chemical dependency and sustain motivation to continue in recovery  Description: INTERVENTIONS:  - Attends all daily group sessions and scheduled AA groups  - Actively practices coping skills through participation in the therapeutic community and adherence to program rules  - Reviews and completes assignments from individual treatment plan  - Assist patient development of understanding of their personal cycle of addiction and relapse triggers  Outcome: Progressing  Goal: By discharge, patient will have ongoing treatment plan addressing chemical dependency  Description: INTERVENTIONS:  - Assist patient with resources and/or appointments for ongoing recovery based living  Outcome: Progressing     Problem: INFECTION - ADULT  Goal: Absence or prevention of progression during hospitalization  Description: INTERVENTIONS:  - Assess and monitor for signs and symptoms of infection  - Monitor lab/diagnostic results  - Monitor all insertion sites, i.e. indwelling lines, tubes, and drains  - Monitor endotracheal if appropriate and nasal secretions for changes in amount and color  - [place] appropriate cooling/warming therapies per order  - Administer medications as ordered  - Instruct and encourage patient and family to use good hand hygiene technique  - Identify and instruct in appropriate isolation precautions for identified infection/condition  3/15/2025 1959 by Eddie Sinclair RN  Outcome: Progressing  3/15/2025 1959 by Eddie Sinclair RN  Outcome: Progressing  Goal: Absence of fever/infection during neutropenic period  Description: INTERVENTIONS:  - Monitor WBC    3/15/2025 1959 by Eddie Sinclair RN  Outcome: Progressing  3/15/2025 1959 by Eddie Sinclair RN  Outcome: Progressing     Problem: SAFETY ADULT  Goal: Patient will remain free of falls  Description: INTERVENTIONS:  - Educate patient/family on patient safety including  physical limitations  - Instruct patient to call for assistance with activity   - Consult OT/PT to assist with strengthening/mobility   - Keep Call bell within reach  - Keep bed low and locked with side rails adjusted as appropriate  - Keep care items and personal belongings within reach  - Initiate and maintain comfort rounds  - Make Fall Risk Sign visible to staff  - Apply yellow socks and bracelet for high fall risk patients  - Consider moving patient to room near nurses station  3/15/2025 1959 by Eddie Sinclair RN  Outcome: Progressing  3/15/2025 1959 by Eddie Sinclair RN  Outcome: Progressing  Goal: Maintain or return to baseline ADL function  Description: INTERVENTIONS:  -  Assess patient's ability to carry out ADLs; assess patient's baseline for ADL function and identify physical deficits which impact ability to perform ADLs (bathing, care of mouth/teeth, toileting, grooming, dressing, etc.)  - Assess/evaluate cause of self-care deficits   - Assess range of motion  - Assess patient's mobility; develop plan if impaired  - Assess patient's need for assistive devices and provide as appropriate  - Encourage maximum independence but intervene and supervise when necessary  - Involve family in performance of ADLs  - Assess for home care needs following discharge   - Consider OT consult to assist with ADL evaluation and planning for discharge  - Provide patient education as appropriate  3/15/2025 1959 by Eddie Sinclair RN  Outcome: Progressing  3/15/2025 1959 by Eddie Sinclair RN  Outcome: Progressing  Goal: Maintains/Returns to pre admission functional level  Description: INTERVENTIONS:  - Perform AM-PAC 6 Click Basic Mobility/ Daily Activity assessment daily.  - Set and communicate daily mobility goal to care team and patient/family/caregiver.   - Collaborate with rehabilitation services on mobility goals if consulted  - Out of bed for toileting  - Record patient progress and toleration of activity  level   3/15/2025 1959 by Eddie Sinclair, RN  Outcome: Progressing  3/15/2025 1959 by Eddie Sinclair, RN  Outcome: Progressing

## 2025-03-15 NOTE — PROGRESS NOTES
Progress Note - Hospitalist   Name: Christian Ball 70 y.o. male I MRN: [id number]  Unit/Bed#: 5T DETOX 504-01 I Date of Admission: 3/14/2025   Date of Service: 3/15/2025 I Hospital Day: 1    Assessment & Plan  Alcohol abuse  Patient with hx of heavy alcohol use, intoxicated on admission, ethanol level 189  Reports drinking 16 oz vodka daily.  Last drink 3/13 afternoon  Symptoms on admission of withdrawal include anxiety.   Toxicology consulted. CRS consulted.  Initiate IVF, thiamine, folic acid, MVI.  Phenobarbital loading dose 130 mg.  SEWS protocol.   Suicidal ideation  Patient reports suicidal ideation.  Denies a plan.  Denies homicidal ideation.  Reports a history of suicide attempts in the 1970s.  Patient has a history of bipolar 1 disorder per chart review.  Does not take any medications for this but was previously prescribed Latuda.  Psych consult  Chronic low back pain  Continue methocarbamol as needed.  Benign essential hypertension  Continue amlodipine and lisinopril.  Type 2 diabetes mellitus with ophthalmic complication, without long-term current use of insulin (HCC)  Lab Results   Component Value Date    HGBA1C 5.7 (H) 03/14/2025       Recent Labs     03/14/25  2303 03/15/25  0636 03/15/25  1048   POCGLU 89 79 123       Blood Sugar Average: Last 72 hrs:  (P) 97  Home agents held.  Sliding scale and hypoglycemia protocol.  Opioid use disorder, severe, on maintenance therapy, dependence (HCC)  Reportedly on 82 mg of methadone daily. Last dose yesterday. Patient desires to be weaned off of this.  Confirmed by toxicology who called methadone clinic as patient was off methadone he was having  Mild withdrawal symptoms, will continue with methadone and monitor response  Alcohol use disorder, severe, dependence (HCC)  Pt with a h/o of daily alcohol use   Drinks 16 ounces of vodka daily  No known h/o withdrawal seizures or previous withdrawal complications  Is not a naltrexone candidate  Withdrawal management  as above  Initiate IVFs, p.o. thiamine, folic acid, and MVI  Consult to CRS   Consult case management/CRS for assistance with aftercare resources - pt interested in home resources at this time     VTE Pharmacologic Prophylaxis: VTE Score: 3 Moderate Risk (Score 3-4) - Pharmacological DVT Prophylaxis Ordered: enoxaparin (Lovenox).    Mobility:   Basic Mobility Inpatient Raw Score: 19  JH-HLM Goal: 6: Walk 10 steps or more  JH-HLM Achieved: 6: Walk 10 steps or more  JH-HLM Goal achieved. Continue to encourage appropriate mobility.    Patient Centered Rounds: I performed bedside rounds with nursing staff today.   Discussions with Specialists or Other Care Team Provider: CM, tox    Education and Discussions with Family / Patient: Patient declined call to .     Current Length of Stay: 1 day(s)  Current Patient Status: Inpatient   Certification Statement: The patient will continue to require additional inpatient hospital stay due to awaiting psych eval and continue monitoring for opioid withdrawal  Discharge Plan: Anticipate discharge tomorrow to home with home services.    Code Status: Level 1 - Full Code    Subjective   Patient reports to be in opiate withdrawal and would like to have his methadone this a.m., reports improvement after starting methadone.  Currently denies any chest pain/pressure, palpitations, lightheadedness, nausea, shortness of breath, or chills    Objective :  Temp:  [97 °F (36.1 °C)-98 °F (36.7 °C)] 97 °F (36.1 °C)  HR:  [82-92] 86  BP: (123-160)/(72-97) 144/85  Resp:  [16-18] 18  SpO2:  [92 %-98 %] 96 %  O2 Device: None (Room air)    Body mass index is 29.16 kg/m².     Input and Output Summary (last 24 hours):     Intake/Output Summary (Last 24 hours) at 3/15/2025 1439  Last data filed at 3/15/2025 1200  Gross per 24 hour   Intake 1775 ml   Output 200 ml   Net 1575 ml       Physical Exam  Vitals and nursing note reviewed.   Constitutional:       General: He is not in acute  distress.     Appearance: He is normal weight. He is not ill-appearing, toxic-appearing or diaphoretic.   HENT:      Head: Normocephalic.      Nose: Nose normal.      Mouth/Throat:      Mouth: Mucous membranes are moist.      Pharynx: Oropharynx is clear.   Eyes:      General: No scleral icterus.     Conjunctiva/sclera: Conjunctivae normal.      Pupils: Pupils are equal, round, and reactive to light.   Cardiovascular:      Rate and Rhythm: Normal rate and regular rhythm.      Heart sounds: No murmur heard.     No friction rub. No gallop.   Pulmonary:      Effort: Pulmonary effort is normal. No respiratory distress.      Breath sounds: Normal breath sounds. No stridor. No wheezing, rhonchi or rales.   Abdominal:      General: Abdomen is flat.      Palpations: Abdomen is soft.   Musculoskeletal:         General: Normal range of motion.      Cervical back: Normal range of motion and neck supple.      Right lower leg: No edema.      Left lower leg: No edema.      Comments: Patient has notable tremors in a.m., has since resolved   Lymphadenopathy:      Cervical: No cervical adenopathy.   Skin:     General: Skin is warm.      Coloration: Skin is not jaundiced or pale.      Findings: No bruising, erythema or lesion.   Neurological:      General: No focal deficit present.      Mental Status: He is alert and oriented to person, place, and time. Mental status is at baseline.      Cranial Nerves: No cranial nerve deficit.      Motor: No weakness.   Psychiatric:         Mood and Affect: Mood normal.         Behavior: Behavior normal.         Thought Content: Thought content normal.           Lines/Drains:              Lab Results: I have reviewed the following results:   Results from last 7 days   Lab Units 03/15/25  0822 03/14/25  1533   WBC Thousand/uL 4.62 5.07   HEMOGLOBIN g/dL 13.7 11.3*   HEMATOCRIT % 41.8 34.3*   PLATELETS Thousands/uL 188 172   SEGS PCT %  --  53   LYMPHO PCT %  --  30   MONO PCT %  --  14*   EOS PCT %   --  3     Results from last 7 days   Lab Units 03/15/25  0631 03/14/25  1533   SODIUM mmol/L 136 139   POTASSIUM mmol/L 4.6 3.6   CHLORIDE mmol/L 98 101   CO2 mmol/L 26 28   BUN mg/dL 11 15   CREATININE mg/dL 0.67 0.67   ANION GAP mmol/L 12 10   CALCIUM mg/dL 8.9 8.6   ALBUMIN g/dL  --  4.0   TOTAL BILIRUBIN mg/dL  --  0.42   ALK PHOS U/L  --  52   ALT U/L  --  21   AST U/L  --  24   GLUCOSE RANDOM mg/dL 74 92         Results from last 7 days   Lab Units 03/15/25  1048 03/15/25  0636 03/14/25  2303   POC GLUCOSE mg/dl 123 79 89     Results from last 7 days   Lab Units 03/14/25  1533   HEMOGLOBIN A1C % 5.7*           Recent Cultures (last 7 days):         Imaging Results Review: No pertinent imaging studies reviewed.  Other Study Results Review: No additional pertinent studies reviewed.    Last 24 Hours Medication List:     Current Facility-Administered Medications:     aluminum-magnesium hydroxide-simethicone (MAALOX) oral suspension 30 mL, Q6H PRN    amLODIPine (NORVASC) tablet 10 mg, Daily    cloNIDine (CATAPRES) tablet 0.1 mg, Q6H PRN    enoxaparin (LOVENOX) subcutaneous injection 40 mg, Daily    folic acid (FOLVITE) tablet 1 mg, Daily    gabapentin (NEURONTIN) capsule 300 mg, Q8H PRN    ibuprofen (MOTRIN) tablet 400 mg, Q6H PRN    insulin lispro (HumALOG/ADMELOG) 100 units/mL subcutaneous injection 1-5 Units, HS    insulin lispro (HumALOG/ADMELOG) 100 units/mL subcutaneous injection 1-6 Units, TID AC **AND** Fingerstick Glucose (POCT), TID AC    lisinopril (ZESTRIL) tablet 20 mg, Daily    methadone (DOLOPHINE) oral concentrated solution 80 mg, Daily    methocarbamol (ROBAXIN) tablet 750 mg, Q6H PRN    multi-electrolyte (Plasmalyte-A/Isolyte-S PH 7.4/Normosol-R) IV solution, Continuous, Last Rate: 75 mL/hr (03/15/25 1134)    multivitamin-minerals (CENTRUM) tablet 1 tablet, Daily    OLANZapine (ZyPREXA) IM injection 5 mg, Q3H PRN Max 6/day    ondansetron (ZOFRAN) injection 4 mg, Q6H PRN    thiamine tablet 100 mg,  Daily    Administrative Statements   Today, Patient Was Seen By: Wilfrido Sahni PA-C  I have spent a total time of 35 minutes in caring for this patient on the day of the visit/encounter including Documenting in the medical record, Reviewing/placing orders in the medical record (including tests, medications, and/or procedures), Obtaining or reviewing history  , and Communicating with other healthcare professionals .    **Please Note: This note may have been constructed using a voice recognition system.**

## 2025-03-15 NOTE — CONSULTS
Consultation - Medical Toxicology   Name: Christian Ball 70 y.o. male I MRN: [id number]  Unit/Bed#: 5T DETOX 504-01 I Date of Admission: 3/14/2025   Date of Service: 3/15/2025 I Hospital Day: 1   Inpatient consult to Toxicology  Consult performed by: Asad Diaz DO  Consult ordered by: Magdiel Solano PA-C        Physician Requesting Evaluation: Valentine Rayo *   Reason for Evaluation / Principal Problem: Alcohol withdrawal    Assessment & Plan  Opioid use disorder, severe, on maintenance therapy, dependence (HCC)  I called the methadone dose verification line and confirmed his dose.   He is on 82mg. Will order 80mg daily for simplicity while here in the hospital.   The Detox MAT nursing protocol for opioid withdrawal was ordered upon admission for unclear reasons. That protocol is no longer used here and the patient is not a candidate for buprenorphine since he is already on methadone.   Alcohol use disorder, severe, dependence (HCC)  The patient went almost 2 years without alcohol use and until suffering recurrence of use only 2 weeks ago. Therefore, he is not really at much risk for the development of significant withdrawal.   He c/o mostly opioid withdrawal symptoms since he has not had his dose in 2 days. His dose was not verified upon admission, nor ordered. I called the after hours line and verified his dose.   He has received a total of 455mg of phenobarbital thus far. I will d/c SEWS now. He I stable from a med tox standpoint for dispo per the primary team. Med tox will sign off and remain available as needed.   Chronic low back pain    Benign essential hypertension    Type 2 diabetes mellitus with ophthalmic complication, without long-term current use of insulin ([facility])  Lab Results   Component Value Date    HGBA1C 6.6 (H) 09/15/2021       Recent Labs     03/14/25  2303 03/15/25  0636   POCGLU 89 79       Blood Sugar Average: Last 72 hrs:  (P) 84    I have discussed the above management plan  in detail with the primary service.         For further questions, please contact the medical  on call via SecureChat between 8am and 9pm. If between 9pm and 8am, please reach out to the [facility] at [phone].     History of Present Illness   Christian Ball is a 70 y.o. year old male who presents with alcohol use. He has a h/o OUD and AUD. He is maintained on methadone due to a h/o OUD that developed from prescription opioids. He also has a h/o AUD. He was abstinent for almost 2 years prior to suffering recurrence of use 2 weeks ago. He started drinking whiskey. Over the past 2 weeks, he went from  half a fifth per day to a fifth per day the past few days. He came to the hospital for help with alcohol use. He currently c/o feeling anxious and restless because he has not had his methadone in 2 days.     Review of Systems   Constitutional:  Negative for chills and fever.   HENT:  Negative for ear pain and sore throat.    Eyes:  Negative for pain and visual disturbance.   Respiratory:  Negative for cough and shortness of breath.    Cardiovascular:  Negative for chest pain and palpitations.   Gastrointestinal:  Negative for abdominal pain and vomiting.   Genitourinary:  Negative for dysuria and hematuria.   Musculoskeletal:  Negative for arthralgias and back pain.   Skin:  Negative for color change and rash.   Neurological:  Positive for tremors. Negative for seizures and syncope.   Psychiatric/Behavioral:  Positive for suicidal ideas. The patient is nervous/anxious.    All other systems reviewed and are negative.      Historical Information   Medical History Review: I have reviewed the patient's PMH, PSH, Social History, Family History, Meds, and Allergies   Social History     Tobacco Use    Smoking status: Former    Smokeless tobacco: Never   Vaping Use    Vaping status: Never Used   Substance and Sexual Activity    Alcohol use: Yes    Drug use: No    Sexual activity: Not Currently     No  family history on file.    Meds/Allergies   Prior to Admission medications    Medication Sig Start Date End Date Taking? Authorizing Provider   lisinopril (ZESTRIL) 20 mg tablet Take 20 mg by mouth daily   Yes Historical Provider, MD   methocarbamol (ROBAXIN) 750 mg tablet Take 750 mg by mouth Three times daily as needed 3/10/25  Yes Historical Provider, MD   amLODIPine (NORVASC) 10 mg tablet Take 1 tablet (10 mg total) by mouth daily 9/22/21 10/22/21  Ayana Mendez MD   gabapentin (NEURONTIN) 300 mg capsule Take 1 capsule (300 mg total) by mouth 3 (three) times a day for 10 days 9/21/21 10/1/21  Ayana Mendez MD   lurasidone 60 MG TABS Take 1 tablet (60 mg total) by mouth daily with dinner 9/21/21 10/21/21  Ayana Mendez MD   rosuvastatin (CRESTOR) 10 MG tablet Take 10 mg by mouth daily  Patient not taking: Reported on 3/14/2025 3/26/24 3/26/25  Historical Provider, MD   traMADol (ULTRAM) 50 mg tablet Take 50 mg by mouth every 8 (eight) hours as needed for moderate pain  Patient not taking: Reported on 9/7/2022    Historical Provider, MD   traZODone (DESYREL) 50 mg tablet Take 1 tablet (50 mg total) by mouth daily at bedtime as needed for sleep 9/21/21 10/21/21  Ayana Mendez MD     Current Facility-Administered Medications:     acetaminophen (TYLENOL) tablet 650 mg, 650 mg, Oral, Q4H PRN, Magdiel Solano PA-C, 650 mg at 03/15/25 0328    aluminum-magnesium hydroxide-simethicone (MAALOX) oral suspension 30 mL, 30 mL, Oral, Q6H PRN, Magdiel Solano PA-C    amLODIPine (NORVASC) tablet 10 mg, 10 mg, Oral, Daily, Magdiel Solano PA-C, 10 mg at 03/15/25 0808    cloNIDine (CATAPRES) tablet 0.1 mg, 0.1 mg, Oral, Q6H PRN, Magdiel Solano PA-C, 0.1 mg at 03/14/25 5781    enoxaparin (LOVENOX) subcutaneous injection 40 mg, 40 mg, Subcutaneous, Daily, Magdiel Solano PA-C, 40 mg at 03/15/25 0808    folic acid (FOLVITE) tablet 1 mg, 1 mg, Oral, Daily, Magdiel Solano PA-C, 1 mg at 03/15/25 0808    gabapentin (NEURONTIN)  capsule 300 mg, 300 mg, Oral, Q8H PRN, Magdiel Solano PA-C, 300 mg at 03/14/25 2141    insulin lispro (HumALOG/ADMELOG) 100 units/mL subcutaneous injection 1-5 Units, 1-5 Units, Subcutaneous, HS, Magdiel Solano PA-C    insulin lispro (HumALOG/ADMELOG) 100 units/mL subcutaneous injection 1-6 Units, 1-6 Units, Subcutaneous, TID AC **AND** Fingerstick Glucose (POCT), , , TID AC, Magdiel Solano PA-C    lisinopril (ZESTRIL) tablet 20 mg, 20 mg, Oral, Daily, Magdiel Solano PA-C, 20 mg at 03/15/25 0807    methadone (DOLOPHINE) oral concentrated solution 80 mg, 80 mg, Oral, Daily, Asad Diaz DO    methocarbamol (ROBAXIN) tablet 750 mg, 750 mg, Oral, Q6H PRN, Magdiel Solano PA-C, 750 mg at 03/15/25 0329    multi-electrolyte (Plasmalyte-A/Isolyte-S PH 7.4/Normosol-R) IV solution, 75 mL/hr, Intravenous, Continuous, Magdiel Solano PA-C, Last Rate: 75 mL/hr at 03/14/25 2130, 75 mL/hr at 03/14/25 2130    multivitamin-minerals (CENTRUM) tablet 1 tablet, 1 tablet, Oral, Daily, Magdiel Solano PA-C, 1 tablet at 03/15/25 0808    OLANZapine (ZyPREXA) IM injection 5 mg, 5 mg, Intramuscular, Q3H PRN Max 6/day, Magdiel Solano PA-C    ondansetron (ZOFRAN) injection 4 mg, 4 mg, Intravenous, Q6H PRN, Magdiel Solano PA-C    thiamine tablet 100 mg, 100 mg, Oral, Daily, Magdiel Solano PA-C, 100 mg at 03/15/25 0807   No Known Allergies    Objective :  Temp:  [97 °F (36.1 °C)-98 °F (36.7 °C)] 98 °F (36.7 °C)  HR:  [82-98] 83  BP: (123-160)/() 158/88  Resp:  [16-18] 18  SpO2:  [92 %-100 %] 97 %  O2 Device: None (Room air)      Intake/Output Summary (Last 24 hours) at 3/15/2025 0900  Last data filed at 3/15/2025 0001  Gross per 24 hour   Intake 480 ml   Output --   Net 480 ml       Physical Exam  Constitutional:       General: He is not in acute distress.     Appearance: Normal appearance.   HENT:      Mouth/Throat:      Mouth: Mucous membranes are moist.   Eyes:      Extraocular Movements: Extraocular movements intact.    Cardiovascular:      Rate and Rhythm: Normal rate and regular rhythm.   Pulmonary:      Effort: Pulmonary effort is normal.      Breath sounds: Normal breath sounds.   Abdominal:      General: There is no distension.      Palpations: Abdomen is soft.   Skin:     Coloration: Skin is not jaundiced.   Neurological:      Mental Status: He is alert and oriented to person, place, and time.      Comments: Mild intention tremor. No tongue fasciculations   Psychiatric:         Behavior: Behavior normal.           Lab Results: I have reviewed the following results:  Results from last 7 days   Lab Units 03/15/25  0822 03/14/25  1533   WBC Thousand/uL 4.62 5.07   HEMOGLOBIN g/dL 13.7 11.3*   HEMATOCRIT % 41.8 34.3*   PLATELETS Thousands/uL 188 172   SEGS PCT %  --  53   LYMPHO PCT %  --  30   MONO PCT %  --  14*   EOS PCT %  --  3      Results from last 7 days   Lab Units 03/15/25  0631 03/14/25  1533   POTASSIUM mmol/L 4.6 3.6   CHLORIDE mmol/L 98 101   CO2 mmol/L 26 28   BUN mg/dL 11 15   CREATININE mg/dL 0.67 0.67   CALCIUM mg/dL 8.9 8.6   ALBUMIN g/dL  --  4.0   ALK PHOS U/L  --  52   ALT U/L  --  21   AST U/L  --  24   MAGNESIUM mg/dL  --  2.1                       Results from last 7 days   Lab Units 03/14/25  1542   ETHANOL LVL mg/dL 189*     Imaging Results Review: No pertinent imaging studies reviewed.  Other Study Results Review: No additional pertinent studies reviewed.    Administrative Statements   I have spent a total time of 45 minutes in caring for this patient on the day of the visit/encounter including Patient and family education, Documenting in the medical record, Reviewing/placing orders in the medical record (including tests, medications, and/or procedures), Obtaining or reviewing history  , and Communicating with other healthcare professionals .

## 2025-03-15 NOTE — PROGRESS NOTES
03/15/25 1836   Financial Resource Strain   How hard is it for you to pay for the very basics like food, housing, medical care, and heating? Somewhat   Housing Stability   In the last 12 months, was there a time when you were not able to pay the mortgage or rent on time? N   In the past 12 months, how many times have you moved where you were living? 0   At any time in the past 12 months, were you homeless or living in a shelter (including now)? N   Transportation Needs   In the past 12 months, has lack of transportation kept you from medical appointments or from getting medications? yes   In the past 12 months, has lack of transportation kept you from meetings, work, or from getting things needed for daily living? Yes   Food Insecurity   Within the past 12 months, you worried that your food would run out before you got the money to buy more. Sometimes   Within the past 12 months, the food you bought just didn't last and you didn't have money to get more. Sometimes   Social Connections   In a typical week, how many times do you talk on the phone with family, friends, or neighbors? Once a week   How often do you get together with friends or relatives? Once   How often do you attend Rastafarian or Alevism services? 1 to 4   Do you belong to any clubs or organizations such as Rastafarian groups, unions, fraternal or athletic groups, or school groups? No   How often do you attend meetings of the clubs or organizations you belong to? Never   Are you , , , , never , or living with a partner?    Intimate Partner Violence   Within the last year, have you been afraid of your partner or ex-partner? No   Within the last year, have you been humiliated or emotionally abused in other ways by your partner or ex-partner? No   Within the last year, have you been kicked, hit, slapped, or otherwise physically hurt by your partner or ex-partner? No   Within the last year, have you been raped or  forced to have any kind of sexual activity by your partner or ex-partner? No   Alcohol Use   Q1: How often do you have a drink containing alcohol? 4 or more ti   Q2: How many drinks containing alcohol do you have on a typical day when you are drinking? 10 or more   Q3: How often do you have six or more drinks on one occasion? Daily   Utilities   In the past 12 months has the electric, gas, oil, or water company threatened to shut off services in your home? No   Health Literacy   How often do you need to have someone help you when you read instructions, pamphlets, or other written material from your doctor or pharmacy? Never   Follow-Ups   We make community resources available to all of our patients to assist with everyday needs. We may be able to connect you with those resources. Would you be interested? Y   Would you be interested in assistance with any of these areas? If so, which ones? 2;9;8

## 2025-03-15 NOTE — PLAN OF CARE
Problem: PAIN - ADULT  Goal: Verbalizes/displays adequate comfort level or baseline comfort level  Description: Interventions:  - Encourage patient to monitor pain and request assistance  - Assess pain using appropriate pain scale  - Administer analgesics based on type and severity of pain and evaluate response  - Implement non-pharmacological measures as appropriate and evaluate response  - Consider cultural and social influences on pain and pain management  - Notify physician/advanced practitioner if interventions unsuccessful or patient reports new pain  Outcome: Progressing     Problem: INFECTION - ADULT  Goal: Absence or prevention of progression during hospitalization  Description: INTERVENTIONS:  - Assess and monitor for signs and symptoms of infection  - Monitor lab/diagnostic results  - Monitor all insertion sites, i.e. indwelling lines, tubes, and drains  - Administer medications as ordered  - Instruct and encourage patient and family to use good hand hygiene technique  - Identify and instruct in appropriate isolation precautions for identified infection/condition  Outcome: Progressing  Goal: Absence of fever/infection during neutropenic period  Description: INTERVENTIONS:  - Monitor WBC    Outcome: Progressing     Problem: DISCHARGE PLANNING  Goal: Discharge to home or other facility with appropriate resources  Description: INTERVENTIONS:  - Identify barriers to discharge w/patient and caregiver  - Arrange for needed discharge resources and transportation as appropriate  - Identify discharge learning needs (meds, wound care, etc.)  - Arrange for interpretive services to assist at discharge as needed  - Refer to Case Management Department for coordinating discharge planning if the patient needs post-hospital services based on physician/advanced practitioner order or complex needs related to functional status, cognitive ability, or social support system  Outcome: Progressing     Problem: Knowledge  Deficit  Goal: Patient/family/caregiver demonstrates understanding of disease process, treatment plan, medications, and discharge instructions  Description: Complete learning assessment and assess knowledge base.  Interventions:  - Provide teaching at level of understanding  - Provide teaching via preferred learning methods  Outcome: Progressing

## 2025-03-15 NOTE — ASSESSMENT & PLAN NOTE
I called the methadone dose verification line and confirmed his dose.   He is on 82mg. Will order 80mg daily for simplicity while here in the hospital.   The Detox MAT nursing protocol for opioid withdrawal was ordered upon admission for unclear reasons. That protocol is no longer used here and the patient is not a candidate for buprenorphine since he is already on methadone.

## 2025-03-15 NOTE — ASSESSMENT & PLAN NOTE
Patient reports suicidal ideation.  Denies a plan.  Denies homicidal ideation.  Reports a history of suicide attempts in the 1970s.  Patient has a history of bipolar 1 disorder per chart review.  Does not take any medications for this but was previously prescribed Latuda.  Psych consult.

## 2025-03-15 NOTE — DISCHARGE INSTR - OTHER ORDERS
AA meeting guide   https://www.aa.org/find-aa    AA Phone apps:   Meeting Guide  Everything AA  In the Rooms    AA/24 hour hotline   [phone]    Narcotics Anonymous 24/7 [place] Meeting  https://www.lodl171.org/     SYNC Recovery Events    Sync Recovery Adventure organizes meaningful events for people in recovery and their families. Our main goal is to have fun by connecting with nature and other people. We can then realize that there is a world of possibilities open to us, now that we are no longer in the bondage of addiction. These events are designed to provide :  Hope for those in early recovery  Tangible proof that life gets better when we’re sober  Service opportunities for people with recovery experience  Social connectedness for everyone involved    [address]  [place] [address]  Phone: [phone]  Email: info@[employer]   Website: https://G[employer]/    SMART Recovery Meetings    Self Management and Recovery Training (SMART)  SMART Recovery is an evidenced-informed recovery method grounded in Rational Emotive Behavioral Therapy (REBT) and Cognitive Behavioral Therapy (CBT), that supports people with substance dependencies or problem behaviors to:  Build and maintain motivation  [place] with urges and cravings  Manage thoughts, feelings and behaviors  Live a balanced life    Find meetings and tools: https://Cordium Links.org/    CRISIS INFORMATION  If you are experiencing a mental health emergency, you may call the [facility] Crisis Intervention Office 24 hours a day, 7 days per week at [phone].    In [facility], call [phone].    Warmline is a confidential 24/7 telephone support service manned by trained mental health consumers.  Warmline provides support, a listening ear and can provide information about available services.Warmline specializes in the concerns of mental health consumers, their families and friends.  However, we are also here for anyone who has a  mental health concern, is confused about or just doesn't know anything about mental health or where to get information.  To reach [facility], call [phone].    HOW TO GET SUBSTANCE ABUSE HELP:  If you or someone you know has a drug or alcohol problem, there is help:  [facility] Drug & Alcohol Abuse Services: [phone]  [facility] Drug & Alcohol Abuse Services: [phone]  An assessment is the first step.   In addition to those listed there are other programs available in the area but assessment is best to determine an appropriate level of care.  If you DO NOT have Medical Assistance (MA) or Private Insurance, an assessment can be scheduled at one of these providers:  [facility]  [address] [place] [address]  [phone]   [address] [place] [address]  [phone]   [facility]  [address] [facility] [place] [address]  [phone]   [facility]  [address] [facility] [address] [place] [address]  [phone]   [facility]  [address] [place] [address] [place] [address]  [phone]   [facility]  [address] [facility] [place] [address]  [phone]     If you HAVE Medical Assistance, an assessment can be scheduled at one of these providers:  [facility] Drug [facility]  [address] [facility] [place] [address]  [phone]   [facility]  [address] [facility] [place] [address]  [phone]   [facility] D[facility]A Intake Unit  [address] [facility] 1st Floor, [place] [address]  [phone]  [address] [place] [address], [address], [place] [address] [phone]   [address] [place] [address]  [phone]   [facility]  [address] [facility] [place] [address]  [phone]   [facility]  [address] [place] [address]  [phone]   [facility]  [address] [facility] [address] [place] [address] [phone]   [facility]  [facility]  [address] [place] [address]  [phone]   [facility]  [address] [place] [address]  [phone]     If you HAVE Private Insurance, an assessment can be scheduled at one of these providers.  Please contact these Providers to determine if they are in your network plan:  [facility] D[facility]A Intake Unit  [address], 1st Floor, [place] [address]  [phone]   [facility]  [address] [place] [address]  [phone]   [facility]  [address] [facility] [place] [address]  [phone]   NET ([facility])  [address] [facility], [place] [address]  [phone]   [facility]  [address] [place] [address]  [phone]     From the [facility] website www.[place].gov/guides/opioid-epidemic/#GetNaloxone    How do I get naloxone?  Family members and friends can access naloxone by:    Obtaining a prescription from their family doctor  Using the standing order issued by Acting Physician General Karina Carmona. A standing order is a prescription written for the general public, rather than specifically for an individual.  To use the standing order, print it and take it with you to the pharmacy or have the digital version on your phone. Download the standing order from the Department of Health (PDF).    If you are unable to print it or use the digital version, the standing order is kept on file at many pharmacies. If a pharmacy does not have it on file, they may have the ability to look it up.    Naloxone prescriptions can be filled at most pharmacies. Although the medication might not be available for same-day pickup, it often can be ordered and available within a day or two.    Transportation Resources    OneSun/ LantaVan  General Information  Customer Online Comment/Complaint Form  Customer Service:  [phone]  Monday to Friday - 7 AM to  5:30 PM  Saturday - 8 AM to 4:30  PM  Sunday - 8 AM to 4:30 PM - call center only  Customer Service is closed on holidays that bus service does not run.  LAN[employer]Van (formerly [employer])  [phone] - Service Reservations  [phone] - General Information Applications  Monday to Friday -- 8 AM to 4:30 PM  [place] Office:  [address] [place] [address]  Monday to Friday -- 8 AM to 4:30 PM  [place] Office:  [address] [place] [address]  Monday to Friday -- 8 AM to 4:30 PM  LANtaBus and LANtaVan service hours are  Weekdays - 5:30 AM to 7:30 PM  Saturdays - 5:30 AM to 7:30 PM  Sundays - 7:00 AM to 7:15 PM  Service on 100 series routes operates after 7:30 pm as late as midnight on weekdays and Saturdays.  Please see individual schedules for details on specific service times.  [employer]Van (formerly [employer]) ADA paratransit service is available during all LANtaBus service hours.    Reji[employer] Ride    We established [employer]ji[employer] to help cope with our loss. We want his legacy of compassion and generosity to continue long after his death. Using a ride share service such as Digital Trowel or Uber, Reji’[employer] Ride will provide free transportation to people living with substance use disorder to get to recovery services. Transportation to outpatient rehab, 12-step meetings and other recovery resources is often a significant barrier to recovery, and Reji would want us to help mitigate that. By helping others who live with substance use disorder, Reji continues to make a difference in our world.    Phone: [phone]  Email: info@[employer].org  Website: [email or web address]://[employer].org/    Medical Services Included in MATP   Transportation is available to almost any service that MA pays for. Transportation can be provided to: physicians, dentists, health clinics, podiatrists, rural health clinics, hospice programs, physical therapists, outpatient services, pharmacies, drug and alcohol clinics, mental health centers, outpatient rehab services, optometrists,  dialysis clinics, psychologists, and ambulatory surgical services.   Services that [facility] does not include are emergency or other transportation requiring an ambulance, transportation to sheltered workshops, day care programs, transportation for visitation purposes, stretcher service, yaxl-ftzvlje-wsbg service, transportation to non-medical services, and transportation during severe weather when deemed unsafe or transportation to any medical services that are not payable through the Medical Assistance Program.   Exceptional transportation costs such as air travel, lodging, meals, and attendants are paid for by local [facility] assistance offices instead of [facility].     [place] [phone]   [facility] [employer] [facility] Locations & Contact Information:  [place] [employer]is  Municipalities:  [place], [place], [place], [place], [place], [place], [place], [place],  [place], and [place]  Emergency Food Pantries  [address]  Address: [address] [place] [address]  Phone: [phone]  Hours of Operation: Fridays 10:00AM-1:00PM  Seventh Day [facility]  Address: [address] [address]  Phone: [phone]  Hours of Operation: Thursdays 5:30PM-6:30PM    [address] Photetica [facility]  Address: [address] [place] [address]  Phone: [phone]  Hours of Operation: Monday-Friday 9:30AM-12:00PM  [facility]  Address: [address] [address]  Phone: [phone]  Hours of Operation: By appointment -- Mondays, Tuesdays, Thursdays, & Fridays 8:30AM-4:00PM;  Wednesdays 8:30AM-12:00PM  Sergio Caballero  Address: [address] [address]  Phone: [phone]  Hours of Operation: 1st & 3rd Saturdays 10:00AM-12:00PM  [facility]  Address: [address] [place] [address]  Phone: [phone]  Hours of Operation: 2nd & 4th Thursdays 4:00PM-5:30PM (Only  4th Thursdays during the summer)  Gnosticism Sikhism Mohawk Hoahaoism  Address: [address] [place] [address]  Phone: [phone]  Hours of Operation: By appointment -- Wednesdays 6:00PM  [facility]  Address: [address] [place] [address]  Phone: [phone]  Hours of Operation: Thursdays 11:00AM-2:00PM or By appointment  [facility] [employer]  Address: [address] [address]  Phone: [phone]  Hours of Operation: Saturdays 9:00AM-11:30AM  Mitzy Amish Hoahaoism  Address: [address] [address]  Phone: [phone]  Hours of Operation: Fridays, 3rd & 4th Saturdays 9:00AM-11:00AM  Jerry Sang Burns Pentecostales  Address: [address] [place] [address]  Phone: [phone]  Hours of Operation: Tuesdays 3:00PM-5:00PM or By appointment  Ministering Hands  Address: [address] [place] [address]  Phone: [phone]  Hours of Operation: By appointment  Resurrection Hinduism  Address: [address] [place] [address]  Phone: [phone]  Hours of Operation: 3rd Saturday, 8:00AM-11:00AM  RIPPLE  Address: [address] [place] [address]  Phone: [phone]  Hours of Operation: 3rd Sunday 4:00PM-5:30PM    [place] [employer]ia [employer] (SAACA)  Address: [address] [address]  Phone: [phone]  Hours of Operation: 3rd Wednesday 9:00AM-4:00PM  [place] [facility] Hoahaoism  Address: [address] [place] [address]  Phone: [phone]  Hours of Operation: 1st & 3rd Saturdays 9:00AM-11:30AM  [facility]  Address: [address] [address]  Phone: [phone]  Hours of Operation: By appointment  [place] - [address]  [employer]  Address: [address], [place] [address]  Phone: [phone]  Hours of Operation: 3rd Sunday 12:00PM-1:30PM  [facility]  Address: [address] [facility] [place] [address]  Phone: [phone]  Hours of Operation: 1st & 3rd Thursdays 6:30PM-7:30PM; By appointment -- Mondays  [facility]  Address: [address] [place] [address]  Phone: [phone]  Hours of Operation: By appointment  [facility]  Address: [address] [place] [address]  Phone: [phone]  Hours of Operation: 1st & 3rd Wednesdays 4:00PM-5:30PM  [facility]  Address: [address] [address]  Phone: [phone]  Hours of Operation: 4th Wednesday 6:30PM-7:30PM  [facility]  Address: [address] [place] [address]  Phone: [phone]  Hours of Operation: 2nd Tuesday 10:00AM-12:00PM; 4th Thursday 4:00PM-6:00PM  [place] [address] [place] [address] Family Services (Kosher & Non-Kosher)  Address: [address] [place] [address], [address]  Phone: [phone]  Hours of Operation: By appointment -- Monday-Friday 9:00AM-5:00PM  [place] [address]  [facility]  Address: [address] [place] [address]  Phone: [phone]  Hours of Operation: 1st & 3rd Tuesdays 9:00AM-10:30AM; Thursdays 6:00PM-8:00PM    [facility]  Address: [address] [place] [address]  Phone: [phone]  Hours of Operation: Every other Saturday 10:00AM-12:00PM  [facility]  Address: [address] [place] [address]  Phone: [phone]  Hours of Operation: 3rd Monday 6:00PM-7:30PM  [place] [address]  [facility]  Address: [address] [place] [address]  Phone: [phone]  Hours of Operation: 4th Sunday 9:00AM-11:00AM  First [address], Jerry  Address: [address] [facility] [place] [address]  Phone: [phone]  Hours of Operation: Saturdays 10:30AM-12:30PM  [facility]  Address: [address] [facility] [place] [address]  Phone: [phone]  Hours of Operation: By appointment -- Tuesday-Thursday 8:30AM-4:30PM  [place]  [place] Pantry  Address: [address] [place] [address]  Phone: [phone]  Hours of Operation: 1st & 3rd Wednesdays 6:00PM-8:00PM  [place] - [address]  [facility] Food Bank  Address: [address] [place] [address]  Phone: [phone]  Hours of Operation: Wednesdays & Fridays 3:00PM-4:00PM  [place] - [address]  Kary Lazcano’s Pantry  Address: [address] [facility] [place] [address]  Phone: [phone]  Hours of Operation: Every other Saturday 10:30AM-12:30PM  [place] Alber[place] - [address]  Markos M[facility]  Address: [address] [facility] [place] [address]  Phone: [phone]  Hours of Operation: Tuesdays & Wednesdays 10:00AM-2:00PM; Closed last week of the month  [place] - [address]  [facility]  Address: [facility], [place] [address]  Phone: [phone]  Hours of Operation: 1st Saturday 9:00AM-12:00PM; 3rd Thursday 4:00PM-7:00PM    [place] - [address]  [facility] Food Bank  Address: [address] [facility], [place] [address]  Phone: [phone]  Hours of Operation: 3rd Monday & 3rd Wednesday 4:00PM-6:00PM; 3rd Saturday 10:00AM-12:00PM  [place] - [address]  [facility]  Address: [address] [place] [address]  Phone: [phone]  Hours of Operation: 1st & 3rd Mondays 9:00AM-11:30AM  [place] - [address]  [facility]  Address: [address] [place] [address] [place] [address]  Phone: [phone]  Hours of Operation: 2nd Saturday 9:00AM-11:00AM  [place]  [address]  [facility] Food Bank  Address: [address] [facility] [place] [address]  Phone: [phone]  Hours of Operation: 1st, 2nd, & 3rd Thursdays 4:00PM-7:00PM; Last Saturday 9:30AM-12:00PM  [place] [id number]  [facility]  Address: [address] [place] [address], [place] [address]  Phone: [phone]  Hours of Operation: Tuesdays 6:00PM-7:00PM; Saturdays 4:00PM-5:00PM; Sundays 12:30PM-1:30PM  [place] Food Pantry  Address: [address] [facility], [place]  [address]  Phone: [phone]  Hours of Operation: By appointment -- Mondays 6:00PM      Soup Jose Antonio  [facility]  Address: [address] [place] [address]  Phone: [phone]  Hours of Operation: Monday-Friday 1:30PM-2:30PM  [facility]  Address: [address] [facility] [place] [address]  Phone: [phone]  Hours of Operation: Monday-Friday 9:00AM-5:00PM  [facility] Soup Kitchen  Address: [address] [place] [address]  Phone: [phone]  Hours of Operation: Tuesday-Thursday 12:00PM-1:00PM    [facility]  Address: [address] [place] [address]  Phone: [phone]  Hours of Operation: Sundays 8:00AM-9:00AM; Some holidays  [facility] Food Solis  Municipalities:  [facility] [place], [place], [place], [place], [place], and [place]  Emergency Food Pantries  [place] - [address]  [facility] Food Bank  Address: [address] [place] [address]  Phone: [phone]  Hours of Operation: 2nd Tuesday 10:00AM-12:00PM  [place] - [address]  [facility]  Address: [address] [facility], [place] [address]  Phone: [phone]  Hours of Operation: Wednesdays 10:00AM-12:00PM  [facility]  Address: [address] [facility] [place] [address]  Phone: [phone]  Hours of Operation: 1st & 3rd Tuesdays 5:00PM-6:00PM  [facility]  Address: [address] [facility] [place], [address]  Phone: [phone]  Hours of Operation: Tuesdays 6:00PM-7:00PM  [facility]  Address: [address], [place] [address]  Phone: [phone]  Hours of Operation: 1st & 2nd Saturdays 12:00PM- 4:00PM  [facility]  Address: [address] [place] [address], [place] [address]  Phone: [phone]  Hours of Operation: Monday-Friday 10:30AM-11:30AM  [facility]  Address: [address], [place] [address]  Phone: [phone]  Hours of Operation: 3rd & 4th Saturdays 9:00AM-11:00AM[facility]  School District residents only    [place] - [address]  [place] [facility]  Address: [address] [facility], [place] [address]  Phone: [phone]  Hours of Operation: 2nd Thursday 10:00AM-12:00PM  [facility] [employer] [facility]  Address: [address] [facility], [place] [address]  Phone: [phone]  Hours of Operation: Monday & Tuesday of the first full week of the month 9:00AM-11:00AM  [facility]  Address: [address] [facility], [place] [address]  Phone: [phone]  Hours of Operation: Mondays, Wednesdays, & Thursdays 9:30AM-11:30AM  [facility]  Address: [address] [facility], [place] [address]  Phone: [phone]  Hours of Operation: 2nd & 4th Saturdays 10:00AM-12:00PM  [place] - [address]  [place] [facility]  Address: [address] [facility], [place] [address]  Phone: [phone]  Hours of Operation: By appointment -- Tuesdays & Wednesdays 10:00AM-4:00PM, Thursdays 10:00AM-  12:00PM, & Sundays 4:00PM-7:00PM  [facility] Vivaty  Address: [address] [facility] [address]  Phone: [phone]  Hours of Operation: 3rd Saturday 10:00AM-12:00PM  LUIS E Curran  Address: [address] [facility] [place], [place] [address]  Phone: [phone]  Hours of Operation: 3rd Saturday 9:00AM-11:30AM or by appointment  [facility]  Address: [address] [facility], [place] [address]  Phone: [phone]  Hours of Operation: 2nd & 4thTuesdays 10:00AM-12:00PM  [facility] Pantry  Address: [address] [facility], [place] [address]  Phone: [phone]  Hours of Operation: 1st, 2nd, & 4th Wednesdays 11:00AM-1:00PM; 3rd Wednesday 5:00PM-7:00PM  [facility]  Address: [address] [place] [address], [place] [address]  Phone: [phone]  Hours of Operation: Wednesdays 10:00AM-12:00PM; Last Wednesday 6:00PM-8:00PM  [address]  Address: [address] [place] [address], [place] [address]  Phone: [phone]  Hours of Operation: Fridays 8:30AM-11:30AM &  1:30PM-3:30PM    [facility]  Address: [address] [place] [address]  Phone: [phone]  Hours of Operation: By appointment--Mondays-Fridays 9:00AM -4:30PM.  [facility]  Address: [address] [place] [address]  Phone: [phone]  Hours of Operation: 1st & 3rd Tuesdays 6:00PM-7:30PM  [facility]  Address: [address] [place] [address]  Phone: [phone]  Hours of Operation: By appointment -- Mondays-Fridays 9:00AM-5:00PM  [facility]  Address: [address] [place] [address]  Phone: [phone]  Hours of Operation: 3rd, 4th, & 5th Thursdays 5:00PM-7:00PM  Project  [facility]  Address: [address] [place] [address]  Phone: [phone]  Hours of Operation: Mondays 10:00AM-12:30PM; Thursdays 10:00AM-12:30PM & 1:00PM-3:30 PM  [facility]ival [facility]  Address: [address], [address], [place] [address]  Phone: [phone]  Hours of Operation: Tuesdays 5:00PM-6:00PM  [facility]  Address: [address] [place] [address]  Phone: [phone]  Hours of Operation: Thursdays 6:00PM-7:30PM; Closed 5th Thursday  [place] - [address]  [facility] Network [facility]chants [facility]  Address: [address] [place] [address]  Phone: [phone]  Hours of Operation: For last names beginning with A-M: 2nd Tuesday 8:00AM-10:00AM or 6:00PM-7:00PM;  For last names beginning with N-Z: 2nd Wednesday 8:00AM-10:00AM  [facility] [address]  [facility] Network Merchants [facility]  Address: [address] [place] [address]  Phone: [phone]  Hours of Operation: Wednesdays 9:30AM-12:00PM; 1st, 2nd, & 3rd Thursdays 6:00PM-8:00PM; 2nd & 3rd Saturdays 9:30AM-12:00PM  [place] - [address]  [facility]  Address: [address] [place] [address], [place] [address]  Phone: [phone]  Hours of Operation: 3rd Saturday 9:00AM-11:00AM    [facility]CT [place]  Address: [address] [place] [address], [place] [address]  Phone: [phone]  Hours of  Operation: Tuesdays 10:00AM-2:00PM  [place] [facility]  Address: [address] [facility], [place] [address]  Phone: [phone]  Hours of Operation: Tuesdays 10:00AM-12:00PM; Closed 5th Tuesday  [place] - [address]  PUMP  Address: [address] [place] [address]  Phone: [phone]  Hours of Operation: Mondays 11:00AM-12:30PM & 7:00PM-8:30PM  [facility] [address] [place] [address] Food Pantry  Address: [address] [facility] [place] [address]  Phone: [phone]  Hours of Operation: Mondays 5:00PM-7:00PM  [facility]  Address: [address] [facility] [place] [address]  Phone: [phone]  Hours of Operation: 2nd & 4th Saturdays 9:00AM-10:00AM  Soup Jose Antonio  [place]  [facility] [place]  Address: [address] [place] [address]  Phone: [phone]  Hours of Operation: 2nd Saturday - Lunch (Call for times)  [place]  [place] [facility]  Address: [address] [facility], [place] [address]  Phone: [phone]  Ours of Operation: Sundays 1:00PM-2:00PM  [facility]  Address: [address] [facility] [place] [address]  Phone: [phone]  Hours of Operation: Saturdays 12:00PM-1:00PM  [facility]  Address: [address] [facility] [place] [address]  Phone: [phone]  Hours of Operation: Monday-Friday 8:00AM-4:00PM  Hannah [employer] Kitchen  Address: [address] [facility] [place] [address]  Phone: [phone]  Hours of Operation: Monday-Friday 12:00PM-1:00PM    [facility]  Address: [address] [place] [address]  Phone: [phone]  Hours of Operation: Call for times  [facility]  Address: [address] [place] [id number]  Phone: [phone]  Hours of Operation: Monday-Friday 12:00PM-1:00PM          '

## 2025-03-16 VITALS
RESPIRATION RATE: 18 BRPM | HEIGHT: 72 IN | TEMPERATURE: 97.9 F | SYSTOLIC BLOOD PRESSURE: 149 MMHG | WEIGHT: 215 LBS | HEART RATE: 82 BPM | OXYGEN SATURATION: 95 % | BODY MASS INDEX: 29.12 KG/M2 | DIASTOLIC BLOOD PRESSURE: 90 MMHG

## 2025-03-16 PROBLEM — F10.930 ALCOHOL WITHDRAWAL SYNDROME WITHOUT COMPLICATION (HCC): Status: ACTIVE | Noted: 2025-03-16

## 2025-03-16 LAB
ALBUMIN SERPL BCG-MCNC: 3.9 G/DL (ref 3.5–5)
ALP SERPL-CCNC: 52 U/L (ref 34–104)
ALT SERPL W P-5'-P-CCNC: 16 U/L (ref 7–52)
ANION GAP SERPL CALCULATED.3IONS-SCNC: 10 MMOL/L (ref 4–13)
AST SERPL W P-5'-P-CCNC: 19 U/L (ref 13–39)
BILIRUB SERPL-MCNC: 0.75 MG/DL (ref 0.2–1)
BUN SERPL-MCNC: 11 MG/DL (ref 5–25)
CALCIUM SERPL-MCNC: 8.7 MG/DL (ref 8.4–10.2)
CHLORIDE SERPL-SCNC: 101 MMOL/L (ref 96–108)
CO2 SERPL-SCNC: 26 MMOL/L (ref 21–32)
CREAT SERPL-MCNC: 0.65 MG/DL (ref 0.6–1.3)
ERYTHROCYTE [DISTWIDTH] IN BLOOD BY AUTOMATED COUNT: 14.4 % (ref 11.6–15.1)
GFR SERPL CREATININE-BSD FRML MDRD: 97 ML/MIN/1.73SQ M
GLUCOSE SERPL-MCNC: 107 MG/DL (ref 65–140)
GLUCOSE SERPL-MCNC: 119 MG/DL (ref 65–140)
GLUCOSE SERPL-MCNC: 127 MG/DL (ref 65–140)
HCT VFR BLD AUTO: 40.8 % (ref 36.5–49.3)
HGB BLD-MCNC: 13.1 G/DL (ref 12–17)
MAGNESIUM SERPL-MCNC: 2.2 MG/DL (ref 1.9–2.7)
MCH RBC QN AUTO: 29.3 PG (ref 26.8–34.3)
MCHC RBC AUTO-ENTMCNC: 32.1 G/DL (ref 31.4–37.4)
MCV RBC AUTO: 91 FL (ref 82–98)
PLATELET # BLD AUTO: 177 THOUSANDS/UL (ref 149–390)
PMV BLD AUTO: 10.4 FL (ref 8.9–12.7)
POTASSIUM SERPL-SCNC: 3.6 MMOL/L (ref 3.5–5.3)
PROT SERPL-MCNC: 6.7 G/DL (ref 6.4–8.4)
RBC # BLD AUTO: 4.47 MILLION/UL (ref 3.88–5.62)
SODIUM SERPL-SCNC: 137 MMOL/L (ref 135–147)
WBC # BLD AUTO: 4.62 THOUSAND/UL (ref 4.31–10.16)

## 2025-03-16 PROCEDURE — 82948 REAGENT STRIP/BLOOD GLUCOSE: CPT

## 2025-03-16 PROCEDURE — 80053 COMPREHEN METABOLIC PANEL: CPT | Performed by: STUDENT IN AN ORGANIZED HEALTH CARE EDUCATION/TRAINING PROGRAM

## 2025-03-16 PROCEDURE — 85027 COMPLETE CBC AUTOMATED: CPT

## 2025-03-16 PROCEDURE — 99232 SBSQ HOSP IP/OBS MODERATE 35: CPT | Performed by: EMERGENCY MEDICINE

## 2025-03-16 PROCEDURE — 83735 ASSAY OF MAGNESIUM: CPT | Performed by: STUDENT IN AN ORGANIZED HEALTH CARE EDUCATION/TRAINING PROGRAM

## 2025-03-16 PROCEDURE — 99239 HOSP IP/OBS DSCHRG MGMT >30: CPT

## 2025-03-16 RX ORDER — FOLIC ACID 1 MG/1
1 TABLET ORAL DAILY
Qty: 30 TABLET | Refills: 0 | Status: SHIPPED | OUTPATIENT
Start: 2025-03-17

## 2025-03-16 RX ORDER — LANOLIN ALCOHOL/MO/W.PET/CERES
100 CREAM (GRAM) TOPICAL DAILY
Qty: 30 TABLET | Refills: 0 | Status: SHIPPED | OUTPATIENT
Start: 2025-03-17

## 2025-03-16 RX ADMIN — METHOCARBAMOL 750 MG: 750 TABLET, FILM COATED ORAL at 10:38

## 2025-03-16 RX ADMIN — AMLODIPINE BESYLATE 10 MG: 10 TABLET ORAL at 08:35

## 2025-03-16 RX ADMIN — LISINOPRIL 20 MG: 20 TABLET ORAL at 08:35

## 2025-03-16 RX ADMIN — ENOXAPARIN SODIUM 40 MG: 100 INJECTION SUBCUTANEOUS at 08:36

## 2025-03-16 RX ADMIN — THIAMINE HCL TAB 100 MG 100 MG: 100 TAB at 08:35

## 2025-03-16 RX ADMIN — IBUPROFEN 400 MG: 400 TABLET, FILM COATED ORAL at 04:22

## 2025-03-16 RX ADMIN — METHOCARBAMOL 750 MG: 750 TABLET, FILM COATED ORAL at 04:07

## 2025-03-16 RX ADMIN — IBUPROFEN 400 MG: 400 TABLET, FILM COATED ORAL at 10:38

## 2025-03-16 RX ADMIN — FOLIC ACID 1 MG: 1 TABLET ORAL at 08:35

## 2025-03-16 RX ADMIN — Medication 1 TABLET: at 08:35

## 2025-03-16 RX ADMIN — METHADONE HYDROCHLORIDE 80 MG: 10 CONCENTRATE ORAL at 08:35

## 2025-03-16 NOTE — DISCHARGE SUMMARY
Discharge Summary - Hospitalist   Name: Christian Ball 70 y.o. male I MRN: [id number]  Unit/Bed#: 5T DETOX 504-01 I Date of Admission: 3/14/2025   Date of Service: 3/16/2025 I Hospital Day: 2     Assessment & Plan  Alcohol withdrawal syndrome without complication (HCC)  Patient with hx of heavy alcohol use, intoxicated on admission, ethanol level 189  Reports drinking 16 oz vodka daily.  Last drink 3/13 afternoon  Symptoms on admission of withdrawal include anxiety.   Toxicology consulted. CRS consulted.  Initiate IVF, thiamine, folic acid, MVI.  Phenobarbital loading dose 130 mg, has received a total of 715 mg  SEWS protocol has been since discontinued  Alcohol use disorder, severe, dependence (HCC)  Pt with a h/o of daily alcohol use   Drinks 16 ounces of vodka daily  No known h/o withdrawal seizures or previous withdrawal complications  Is not a naltrexone candidate  Withdrawal management as above  Initiate IVFs, p.o. thiamine, folic acid, and MVI  Consult to CRS   Consult case management/CRS for assistance with aftercare resources - pt interested in home resources at this time   Suicidal ideation  Patient reports suicidal ideation.  Denies a plan.  Denies homicidal ideation.  Reports a history of suicide attempts in the 1970s.  Patient has a history of bipolar 1 disorder per chart review.  Does not take any medications for this but was previously prescribed Latuda.  Psych consult-evaluated, no changes for medication or need for inpatient one-to-one or psychiatric care  Chronic low back pain  Continue methocarbamol as needed.  Benign essential hypertension  Continue amlodipine and lisinopril.  Type 2 diabetes mellitus with ophthalmic complication, without long-term current use of insulin ([facility])  Lab Results   Component Value Date    HGBA1C 5.7 (H) 03/14/2025       Recent Labs     03/15/25  1600 03/15/25  2057 03/16/25  0715 03/16/25  1059   POCGLU 92 151* 119 127       Blood Sugar Average: Last 72 hrs:  (P)  "[id number]  Home agents held.  Sliding scale and hypoglycemia protocol.  Opioid use disorder, severe, on maintenance therapy, dependence (HCC)  Reportedly on 82 mg of methadone daily. Last dose yesterday. Patient desires to be weaned off of this.  Confirmed by toxicology who called methadone clinic  Mild withdrawal symptoms, will continue with methadone and monitor response  Patient is having tremors, however these are thought to be volitional     Discharging Physician / Practitioner: Wilfrido Sahni PA-C  PCP: Chloe Ren MD  Admission Date:   Admission Orders (From admission, onward)       Ordered        03/14/25 2055  INPATIENT ADMISSION  Once            03/14/25 2056  Inpatient Admission  Once                          Discharge Date: 03/16/25    Consultations During Hospital Stay:  IP CONSULT TO TOXICOLOGY  CONSULT TO CERTIFIED   IP CONSULT TO PSYCHIATRY    Procedures Performed:   None    Significant Findings / Test Results:   No results found.    No Chest XR results available for this patient.     No results found for this or any previous visit.      No results for input(s): \"BLOODCX\", \"SPUTUMCULTUR\", \"GRAMSTAIN\", \"URINECX\", \"WOUNDCULT\", \"BODYFLUIDCUL\", \"MRSACULTURE\", \"INFLUAPCR\", \"INFLUBPCR\", \"RSVPCR\", \"LEGIONELLAUR\", \"STPU\", \"CDIFFTOXINB\" in the last 72 hours.    Incidental Findings:   None other than noted above; I reviewed the above mentioned incidental findings with the patient and/or family and they expressed understanding.    Test Results Pending at Discharge (will require follow up):   None     Outpatient Tests Requested:  None    Complications:  None    Reason for Admission: No chief complaint on file.       Hospital Course:   Patient initially presented to the [facility] ED requesting detoxification from alcohol. [facility] for medically assisted alcohol withdrawal and received a total of 715 mg " phenobarbital without complication, with last dose of phenobarbital administered 3/15. Patient's alcohol withdrawal symptoms subsequently resolved, and he has remained without objective evidence of alcohol withdrawal at this time. During this hospitalization, patient was found to have possible opioid withdrawal as he was not initially prescribed his methadone.  Patient was then represcribed methadone and opioid withdrawal symptoms have improved.  Patient then continued to have reports of tremors however on objective examination these appear to be volitional.  Ultimately the toxicology team cleared patient for discharge.  Case management and CRS were consulted for assistance with aftercare resources, and patient will be discharged home with outpatient resources.  At this time patient is medically stable to be discharged and agreeable for plan of discharge.  For further information regards to course hospitalization, please see notes attached.    Please see above list of diagnoses and related plan for additional information.     Condition at Discharge: good    Discharge Day Visit / Exam:   Subjective: Patient reports to be feeling improved, however does report having intermittent tremors and cravings for alcohol use.  He currently offers no new complaints at this time. No acute events reported overnight. Understanding of plan.  All questions answered to the best of my ability at this time to patient satisfaction. Plan of care agreed upon.  Vitals: Blood Pressure: 149/90 (03/16/25 1120)  Pulse: 82 (03/16/25 1120)  Temperature: 97.9 °F (36.6 °C) (03/16/25 1120)  Temp Source: Temporal (03/16/25 1120)  Respirations: 18 (03/16/25 1120)  Height: 6' (182.9 cm) (03/14/25 2100)  Weight - Scale: 97.5 kg (215 lb) (03/14/25 2100)  SpO2: 95 % (03/16/25 1120)  Exam:   Physical Exam  Vitals and nursing note reviewed.   Constitutional:       General: He is not in acute distress.     Appearance: He is normal weight. He is not  ill-appearing, toxic-appearing or diaphoretic.   HENT:      Head: Normocephalic.      Nose: Nose normal.      Mouth/Throat:      Mouth: Mucous membranes are moist.      Pharynx: Oropharynx is clear.   Eyes:      General: No scleral icterus.     Conjunctiva/sclera: Conjunctivae normal.      Pupils: Pupils are equal, round, and reactive to light.   Cardiovascular:      Rate and Rhythm: Normal rate and regular rhythm.      Heart sounds: No murmur heard.     No friction rub. No gallop.   Pulmonary:      Effort: Pulmonary effort is normal. No respiratory distress.      Breath sounds: Normal breath sounds. No stridor. No wheezing, rhonchi or rales.   Abdominal:      General: Abdomen is flat.      Palpations: Abdomen is soft.   Musculoskeletal:         General: Normal range of motion.      Cervical back: Normal range of motion and neck supple.      Right lower leg: No edema.      Left lower leg: No edema.   Lymphadenopathy:      Cervical: No cervical adenopathy.   Skin:     General: Skin is warm.      Coloration: Skin is not jaundiced or pale.      Findings: No bruising, erythema or lesion.   Neurological:      General: No focal deficit present.      Mental Status: He is alert and oriented to person, place, and time. Mental status is at baseline.      Cranial Nerves: No cranial nerve deficit.      Motor: No weakness.      Comments: Patient has tremors when asked to extend arms, however during movements or at periods of time distracted in conversation, tremor spontaneously stopped.  No tongue fasciculations   Psychiatric:         Mood and Affect: Mood normal.         Behavior: Behavior normal.         Thought Content: Thought content normal.          Discussion with Family: Patient declined call to .     Discharge instructions/Information to patient and family:   See after visit summary for information provided to patient and family.      Provisions for Follow-Up Care:  See after visit summary for information  related to follow-up care and any pertinent home health orders.       Mobility at time of Discharge:   Basic Mobility Inpatient Raw Score: 20  JH-HLM Goal: 6: Walk 10 steps or more  JH-HLM Achieved: 3: Sit at edge of bed  HLM Goal achieved. Continue to encourage appropriate mobility.    Disposition:   Home    Planned Readmission: none     Discharge Statement:  I spent 65 minutes discharging the patient. This time was spent on the day of discharge. I had direct contact with the patient on the day of discharge. Greater than 50% of the total time was spent examining patient, answering all patient questions, arranging and discussing plan of care with patient as well as directly providing post-discharge instructions.  Additional time then spent on discharge activities.    Discharge Medications:  See after visit summary for reconciled discharge medications provided to patient and/or family.      **Please Note: This note may have been constructed using a voice recognition system**

## 2025-03-16 NOTE — ASSESSMENT & PLAN NOTE
Patient with hx of heavy alcohol use, intoxicated on admission, ethanol level 189  Reports drinking 16 oz vodka daily.  Last drink 3/13 afternoon  Symptoms on admission of withdrawal include anxiety.   Toxicology consulted. CRS consulted.  Initiate IVF, thiamine, folic acid, MVI.  Phenobarbital loading dose 130 mg, has received a total of 715 mg  SEWS protocol has been since discontinued

## 2025-03-16 NOTE — ASSESSMENT & PLAN NOTE
CM involved for aftercare planning and linkage to ongoing AUD treatment after discharge.   He is not a candidate for naltrexone given methadone use.

## 2025-03-16 NOTE — ASSESSMENT & PLAN NOTE
Lab Results   Component Value Date    HGBA1C 5.7 (H) 03/14/2025       Recent Labs     03/15/25  1600 03/15/25  2057 03/16/25  0715 03/16/25  1059   POCGLU 92 151* 119 127       Blood Sugar Average: Last 72 hrs:  (P) 111.3013054689741600  Home agents held.  Sliding scale and hypoglycemia protocol.

## 2025-03-16 NOTE — DISCHARGE INSTR - AVS FIRST PAGE
Please be aware I prescribed 2 medications to assist with any long-term complications from alcohol withdrawal.  These medications are supplements and should be taken daily.  I have ordered folic acid and thiamine.  Please continue to follow-up with your methadone clinic and your PCP within 1 week for further continuity care.  Please refrain from alcohol use.    As you have received a medication that can impair driving called phenobarbital.  It is recommended no driving for an additional 7 days.

## 2025-03-16 NOTE — ASSESSMENT & PLAN NOTE
Lab Results   Component Value Date    HGBA1C 5.7 (H) 03/14/2025       Recent Labs     03/15/25  1048 03/15/25  1600 03/15/25  2057 03/16/25  0715   POCGLU 123 92 151* 119       Blood Sugar Average: Last 72 hrs:  (P) [id number]

## 2025-03-16 NOTE — UTILIZATION REVIEW
Initial Clinical Review    Pt initially presented to [facility] ED. Pt was transferred by EMS to [facility] for medically managed detox.    Admission: Date/Time/Statement:   Admission Orders (From admission, onward)       Ordered        03/14/25 2055  INPATIENT ADMISSION  Once                          Orders Placed This Encounter   Procedures    Inpatient Admission     Standing Status:   Standing     Number of Occurrences:   1     Level of Care:   Med Surg [16]     Estimated length of stay:   More than 2 Midnights     Certification:   I certify that inpatient services are medically necessary for this patient for a duration of greater than two midnights. See H&P and MD Progress Notes for additional information about the patient's course of treatment.     Initial Presentation: 70 y.o. male who presented to [facility]. Inpatient admission for evaluation and treatment of alcohol withdrawal syndrome. Presented w/ suicidal ideation. Exam:  anxious. H/O alcohol abuse, hepatitis status posttreatment, methadone use, bipolar 1 disorder, hypertension. Wants to come off methadone as well.  ; IVF, telemetry, continuous pulse ox, continue PTA meds, trend labs, replete electrolytes as needed; I&O, fall & seizure precautions. Toxicology consulted.     Anticipated Length of Stay: Patient will be admitted on an inpatient basis with an anticipated length of stay of greater than 2 midnights secondary to detox.       Toxicology: Presented w/ need for detox from alcohol. Serum ETOH:  189. Reports 1/5 daily, last drink on 3/13 @ afternoon. Has prior rehab treatment for withdrawal. Reports no hx of withdrawal seizures. On exam,anxious. SEWS 11. Plan: SEWS monitoring w/ phenobarbital management, PO thiamine/folic acid supplement.       Date: 3/15       Day 2: Pt reports wanting methadone today.  On exam, tremors which resolved during the day. SEWS 0. Plan: continue SEWS  monitoring w/ phenobarbital management, PO thiamine/folic acid supplement, telemetry, continuous pulse ox, continue current meds, trend labs, replete electrolytes as needed. Received 455 mg phenobarbital since admission  Scheduled Medications:  amLODIPine, 10 mg, Oral, Daily  enoxaparin, 40 mg, Subcutaneous, Daily  folic acid, 1 mg, Oral, Daily  insulin lispro, 1-5 Units, Subcutaneous, HS  insulin lispro, 1-6 Units, Subcutaneous, TID AC  lisinopril, 20 mg, Oral, Daily  methadone, 80 mg, Oral, Daily  multivitamin-minerals, 1 tablet, Oral, Daily  thiamine, 100 mg, Oral, Daily      Continuous IV Infusions:     PRN Meds:  aluminum-magnesium hydroxide-simethicone, 30 mL, Oral, Q6H PRN  cloNIDine, 0.1 mg, Oral, Q6H PRN  gabapentin, 300 mg, Oral, Q8H PRN  ibuprofen, 400 mg, Oral, Q6H PRN  methocarbamol, 750 mg, Oral, Q6H PRN  OLANZapine, 5 mg, Intramuscular, Q3H PRN Max 6/day  ondansetron, 4 mg, Intravenous, Q6H PRN      Phenobarbital 130 iv x 2 3/14, x 1 3/15  Phenobarbital 260 mg iv x 1 3/15       Temperature Pulse Respirations Blood Pressure SpO2 Pain Score   03/14/25 2045 03/14/25 2045 03/14/25 2045 03/14/25 2045 03/14/25 2045 03/14/25 2053   (!) 97.4 °F (36.3 °C) 82 18 159/89 97 % 6     Weight (last 2 days)       Date/Time Weight    03/14/25 2100 97.5 (215)              Vital Signs (last 3 days)       Date/Time Temp Pulse Resp BP MAP (mmHg) SpO2 Calculated FIO2 (%) - Nasal Cannula Nasal Cannula O2 Flow Rate (L/min) O2 Device Patient Position - Orthostatic VS [place] Coma Scale Score SEWS Total Score Clinical Opiate Withdrawal Scale Total Score Pain    03/16/25 0835 -- -- -- -- -- -- -- -- -- -- -- -- -- 5    03/16/25 0742 -- -- -- -- -- -- -- -- None (Room air) -- -- -- -- --    03/16/25 0720 97.6 °F (36.4 °C) 73 18 155/96 115 95 % 28 2 L/min Nasal cannula Lying -- -- -- --    03/16/25 0600 -- -- -- -- -- -- -- -- -- -- -- 0 -- --    03/16/25 0433 97.6 °F (36.4 °C) 73 18 162/100 -- 96 % -- -- None (Room air) Lying --  -- -- --    03/16/25 0422 -- -- -- -- -- -- -- -- -- -- -- -- -- 6    03/16/25 0300 -- -- -- -- -- -- -- -- -- -- -- 0 -- --    03/16/25 0100 -- -- -- -- -- -- -- -- -- -- -- 0 -- --    03/15/25 2300 97.7 °F (36.5 °C) 80 18 144/91 -- 98 % -- -- None (Room air) Lying -- -- -- --    03/15/25 2258 -- -- -- -- -- -- -- -- -- -- -- 0 -- --    03/15/25 2000 -- -- -- -- -- -- -- -- -- -- -- 0 -- --    03/15/25 1958 -- -- -- -- -- -- -- -- -- -- 15 -- -- No Pain    03/15/25 1836 97 °F (36.1 °C) 87 18 133/81 -- 93 % 28 2 L/min Nasal cannula Lying -- -- -- --    03/15/25 1835 -- -- -- -- -- 91 % 28 2 L/min Nasal cannula -- -- -- -- --    03/15/25 1800 -- -- -- -- -- -- -- -- -- -- -- 0 -- --    03/15/25 1740 -- -- -- -- -- -- -- -- -- -- -- 8 -- --    03/15/25 1734 97.5 °F (36.4 °C) 85 18 138/86 -- 98 % -- -- None (Room air) Lying -- -- -- --    03/15/25 1550 97 °F (36.1 °C) 74 18 157/100 -- 98 % -- -- None (Room air) Lying -- -- -- --    03/15/25 1237 97 °F (36.1 °C) 86 18 144/85 104 96 % -- -- None (Room air) Lying -- -- -- --    03/15/25 1158 -- -- -- -- -- -- -- -- -- -- -- -- -- 7    03/15/25 0957 -- -- -- -- -- -- -- -- -- -- -- -- -- 8    03/15/25 0800 -- -- -- -- -- -- -- -- -- -- 15 -- -- No Pain    03/15/25 0735 98 °F (36.7 °C) 83 18 158/88 111 97 % -- -- None (Room air) Lying -- 2 -- --    03/15/25 0611 -- -- -- -- -- -- -- -- -- -- -- 11 -- --    03/15/25 0610 97 °F (36.1 °C) 86 18 160/96 117 92 % -- -- None (Room air) -- -- -- 1 No Pain    03/15/25 0328 -- -- -- -- -- -- -- -- -- -- -- -- -- 8    03/15/25 0113 97.8 °F (36.6 °C) 89 18 145/83 103 93 % -- -- None (Room air) Lying -- -- 1 --    03/14/25 2347 -- 89 16 123/72 -- -- -- -- -- -- -- 0 1 --    03/14/25 2252 -- -- -- -- -- -- -- -- -- -- -- 11 -- --    03/14/25 2250 97.3 °F (36.3 °C) 92 18 157/97 -- 95 % -- -- None (Room air) Sitting -- -- 3 --    03/14/25 2110 -- -- -- -- -- -- -- -- -- -- -- 13 -- --    03/14/25 2053 -- -- -- -- -- -- -- -- -- -- -- -- --  6    03/14/25 2045 97.4 °F (36.3 °C) 82 18 159/89 -- 97 % -- -- None (Room air) Lying -- -- 3 --            SEWS: SEWS    Row Name 03/16/25 0600 03/16/25 0300 03/16/25 0100 03/15/25 2258 03/15/25 2000   Severity of Ethanol Withdrawal Scale (SEWS)   ANXIETY: Do you feel that something bad is about to happen to you right now? 0  -TO 0  -TO 0  -TO 0  -TO 0  -TO   NAUSEA and DRY HEAVES or VOMITING? 0  -TO 0  -TO 0  -TO 0  -TO 0  -TO   SWEATING: (includes moist palms, sweating now)? Score 0 or 2 0  -TO 0  -TO 0  -TO 0  -TO 0  -TO   TREMOR: with arms extended eyes closed? 0  -TO 0  -TO 0  -TO 0  -TO 0  -TO   AGITATION: Fidgety, restless, pacing? 0  -TO 0  -TO 0  -TO 0  -TO 0  -TO   DISORIENTATION: 0  -TO 0  -TO 0  -TO 0  -TO 0  -TO   HALLUCINATIONS: 0  -TO 0  -TO 0  -TO 0  -TO 0  -TO   VITAL SIGNS: ANY (Pulse >110, Diastolic BP >90, Temp >99.6) 0  -TO 0  -TO 0  -TO 0  -TO 0  -TO   SEWS Total Score 0  -TO 0  -TO 0  -TO 0  -TO 0  -TO   [place] Agitation Sedation Scale (RASS)   [place] Agitation Sedation Scale (RASS) 0  -TO 0  -TO 0  -TO 0  -TO 0  -TO   Row Name 03/15/25 1800 03/15/25 1740 03/15/25 1700 03/15/25 0735 03/15/25 0611   Severity of Ethanol Withdrawal Scale (SEWS)   ANXIETY: Do you feel that something bad is about to happen to you right now? 0  -TD 3  -TD --  -TD 0  -TD 3  -TB   NAUSEA and DRY HEAVES or VOMITING? 0  -TD 0  -TD --  -TD 0  -TD 0  -TB   SWEATING: (includes moist palms, sweating now)? Score 0 or 2 0  -TD 0  -TD --  -TD 0  -TD 0  -TB   TREMOR: with arms extended eyes closed? 0  -TD 2  -TD --  -TD 2  -TD 2  -TB   AGITATION: Fidgety, restless, pacing? 0  -TD 3  fidgety  -TD --  -TD 0  -TD 3  -TB   DISORIENTATION: 0  -TD 0  -TD --  -TD 0  -TD 0  -TB   HALLUCINATIONS: 0  -TD 0  -TD --  -TD 0  -TD 0  -TB   VITAL SIGNS: ANY (Pulse >110, Diastolic BP >90, Temp >99.6) 0  -TD 0  -TD --  -TD 0  -TD 3  -TB   SEWS Total Score 0  -TD 8  -TD --  -TD 2  -TD 11  -TB   [place] Agitation Sedation Scale (RASS)    [place] Agitation Sedation Scale (RASS) -1  pt sleeping post phenobarbital, will continue to monitor.  -TD 0  -TD --  -TD -1  -TD +1  -TB       Pertinent Labs/Diagnostic Test Results:   Radiology:          Results from last 7 days   Lab Units 03/16/25  0427 03/15/25  0822 03/14/25  1533   WBC Thousand/uL 4.62 4.62 5.07   HEMOGLOBIN g/dL 13.1 13.7 11.3*   HEMATOCRIT % 40.8 41.8 34.3*   PLATELETS Thousands/uL 177 188 172   TOTAL NEUT ABS Thousands/µL  --   --  2.66         Results from last 7 days   Lab Units 03/16/25  0427 03/15/25  0631 03/14/25  1533   SODIUM mmol/L 137 136 139   POTASSIUM mmol/L 3.6 4.6 3.6   CHLORIDE mmol/L 101 98 101   CO2 mmol/L 26 26 28   ANION GAP mmol/L 10 12 10   BUN mg/dL 11 11 15   CREATININE mg/dL 0.65 0.67 0.67   EGFR ml/min/1.73sq m 97 95 95   CALCIUM mg/dL 8.7 8.9 8.6   MAGNESIUM mg/dL 2.2  --  2.1     Results from last 7 days   Lab Units 03/16/25  0427 03/14/25  1533   AST U/L 19 24   ALT U/L 16 21   ALK PHOS U/L 52 52   TOTAL PROTEIN g/dL 6.7 6.9   ALBUMIN g/dL 3.9 4.0   TOTAL BILIRUBIN mg/dL 0.75 0.42     Results from last 7 days   Lab Units 03/16/25  0715 03/15/25  2057 03/15/25  1600 03/15/25  1048 03/15/25  0636 03/14/25  2303   POC GLUCOSE mg/dl 119 151* 92 123 79 89     Results from last 7 days   Lab Units 03/16/25  0427 03/15/25  0631 03/14/25  1533   GLUCOSE RANDOM mg/dL 107 74 92         Results from last 7 days   Lab Units 03/14/25  1533   HEMOGLOBIN A1C % 5.7*   EAG mg/dl 117           Results from last 7 days   Lab Units 03/14/25  1542   ETHANOL LVL mg/dL 189*         Past Medical History:   Diagnosis Date    Anxiety     Depression     Diabetes mellitus (HCC)     Hypertension     PTSD (post-traumatic stress disorder)      Present on Admission:   Benign essential hypertension   Type 2 diabetes mellitus with ophthalmic complication, without long-term current use of insulin (HCC)   Chronic low back pain   Alcohol abuse      Admitting Diagnosis: S/P alcohol detoxification  [Z92.89]  Age/Sex: 70 y.o. male      SEWS PHENOBARBITAL PROTOCOL FOR ALCOHOL WITHDRAWAL  Admit patient to medical detox unit and continue supportive care and stabilization of acute ethanol withdrawal per medical toxicology/detox treatment pathway. Monitor ethanol withdrawal severity via the Severity of Ethanol Withdrawal Scale (SEWS) Q4 hours and then hourly if/when SEWS > 6. Treat withdrawal per pathway and reassess Q30-60 minutes.          Mild SEWS Score 1-6  Administer medications* (IV or PO; PO preferred):  If initial SEWS score: diazepam 10mg PO/IV x 1 AND phenobarbital 65 mg PO/IV x 1  If repeat SEWS score 1-6: phenobarbital 65 mg PO/IV q1 hour x 5 doses maximum   Reassessment:   SEWS q1 hour after each dose until SEWS 0 x 2 hours  VS q1 hours (until SEWS 0, then q4 hours)  Notify provider for bedside evaluation if 5-dose maximum is reached, RASS of -3 to -5, or SEWS score escalates to moderate or severe.   Moderate SEWS Score 7-12  Administer medications* (IV):  If initial SEWS score: diazepam 10mg IV x 1 AND phenobarbital 260 mg IV x 1  If repeat SEWS score 7-12 or score escalated from mild: phenobarbital 130 mg IV q30 minutes x 5 doses maximum   Reassessment:  SEWS q30 minutes after each dose until SEWS < 7 (then hourly until SEWS 0 x 2 hours)  VS q30 minutes until SEWS < 7 (then hourly until SEWS 0, then q4 hours)  Notify provider for bedside evaluation if 5-dose maximum is reached, RASS of -3 to -5, or SEWS score escalates to severe.   Severe SEWS Score >= 13  Administer medications* (IV):  If initial SEWS score: Diazepam 10 mg IV x 1 AND phenobarbital 650 mg IV piggyback x 1 over 15-30 minutes  If repeat SEWS score >= 13 or score escalated from mild or moderate: phenobarbital 130 mg IV q30 minutes x 5 doses maximum   Reassessment:  SEWS q30 minutes after each dose until SEWS < 7 (then hourly until SEWS 0 x 2 hours)   VS q30 minutes until SEWS < 7 (then hourly until SEWS 0, then q4 hours)  Notify  provider for bedside evaluation if 5-dose maximum is reached or RASS of -3 to -5   *Hold medications and notify provider if CNS depression, respirations < 10/min, or RASS of -3 to -5.        Network Utilization Review Department  ATTENTION: Please call with any questions or concerns to [phone] and carefully listen to the prompts so that you are directed to the right person. All voicemails are confidential.   For Discharge needs, contact Care Management DC Support Team at [phone] opt. 2  Send all requests for admission clinical reviews, approved or denied determinations and any other requests to dedicated fax number below belonging to the campus where the patient is receiving treatment. List of dedicated fax numbers for the Facilities:  FACILITY NAME UR FAX NUMBER   ADMISSION DENIALS (Administrative/Medical Necessity) [phone]   DISCHARGE SUPPORT TEAM (NETWORK) [phone]   PARENT CHILD HEALTH (Maternity/NICU/Pediatrics) [phone]   [facility] [phone]   [facility] [phone]   [facility] [phone]   [facility] [phone]   [facility] [phone]   [facility] [phone]   [facility] [phone]   [facility] [phone]   [facility] [phone]   [facility] [phone]   [facility] [phone]   [facility] [phone]

## 2025-03-16 NOTE — ASSESSMENT & PLAN NOTE
Patient is a 70-year-old male with past psychiatric history of bipolar disorder who presented to the hospital for alcohol detoxification.  Psychiatry was consulted due to concerns about the patient making suicidal statements and expressing worsening depression.  At time of interview patient denied he ever made suicidal statements and endorsed only mild depression with additional depressive symptoms that included anhedonia and hopelessness.  Patient was adamant he did not want to start any medications nor follow-up with his outpatient psychiatrist to be has not seen in years.  Patient was provided with information on who to reach out to if he felt like his depression was worsening or if he did become suicidal.    Plan:  No medication recommendations at this time per patient request  Patient is psychiatrically stable for discharge once medically stable  Psychiatry will sign off on this patient for now.  Please feel free to reach out to the consult psychiatry team if there are any concerns or questions.

## 2025-03-16 NOTE — ASSESSMENT & PLAN NOTE
Patient reports suicidal ideation.  Denies a plan.  Denies homicidal ideation.  Reports a history of suicide attempts in the 1970s.  Patient has a history of bipolar 1 disorder per chart review.  Does not take any medications for this but was previously prescribed Latuda.  Psych consult-evaluated, no changes for medication or need for inpatient one-to-one or psychiatric care

## 2025-03-16 NOTE — CONSULTS
Consultation - Behavioral Health   Name: Christian Ball 70 y.o. male I MRN: [id number]  Unit/Bed#: 5T DETOX 504-01 I Date of Admission: 3/14/2025   Date of Service: 3/16/2025 I Hospital Day: 2   Inpatient consult to Psychiatry  Consult performed by: Raymond Goff DO  Consult ordered by: Wilfrido Sahni PA-C        Physician Requesting Evaluation: Valentine Rayo *   Reason for Evaluation / Principal Problem: Suicidal ideation and depression    Assessment & Plan  Alcohol abuse  Per primary team  Chronic low back pain  Per primary team  Benign essential hypertension  Per primary team  Type 2 diabetes mellitus with ophthalmic complication, without long-term current use of insulin ([facility])  Lab Results   Component Value Date    HGBA1C 5.7 (H) 03/14/2025       Recent Labs     03/15/25  1048 03/15/25  1600 03/15/25  2057 03/16/25  0715   POCGLU 123 92 151* 119       Blood Sugar Average: Last 72 hrs:  (P) [id number]    Suicidal ideation  Patient is a 70-year-old male with past psychiatric history of bipolar disorder who presented to the hospital for alcohol detoxification.  Psychiatry was consulted due to concerns about the patient making suicidal statements and expressing worsening depression.  At time of interview patient denied he ever made suicidal statements and endorsed only mild depression with additional depressive symptoms that included anhedonia and hopelessness.  Patient was adamant he did not want to start any medications nor follow-up with his outpatient psychiatrist to be has not seen in years.  Patient was provided with information on who to reach out to if he felt like his depression was worsening or if he did become suicidal.    Plan:  No medication recommendations at this time per patient request  Patient is psychiatrically stable for discharge once medically stable  Psychiatry will sign off on this patient for now.  Please feel free to reach out to the consult psychiatry team if  there are any concerns or questions.  Opioid use disorder, severe, on maintenance therapy, dependence (HCC)  Per primary team  Alcohol use disorder, severe, dependence (HCC)  Per primary team  Alcohol withdrawal syndrome without complication (HCC)  Per primary team  I have discussed the above management plan in detail with the primary service.     Reason for Consult: depression and suicidal ideation    TREATMENT PLAN RECOMMENDATIONS:  Medications: None    PRN for sleep: Melatonin  PRN for agitation: Zyprexa    Current medications:  Current Facility-Administered Medications   Medication Dose Route Frequency Provider Last Rate    aluminum-magnesium hydroxide-simethicone  30 mL Oral Q6H PRN Magdiel Solano PA-C      amLODIPine  10 mg Oral Daily Magdiel Solano PA-C      cloNIDine  0.1 mg Oral Q6H PRN Magdiel Solano PA-C      enoxaparin  40 mg Subcutaneous Daily Magdiel Solano PA-C      folic acid  1 mg Oral Daily Magdiel Solano PA-C      gabapentin  300 mg Oral Q8H PRN Magdiel Solano PA-C      ibuprofen  400 mg Oral Q6H PRN Wilfrido Sahni PA-C      insulin lispro  1-5 Units Subcutaneous HS Magdiel Solano PA-C      insulin lispro  1-6 Units Subcutaneous TID AC Magdiel Solano PA-C      lisinopril  20 mg Oral Daily Magdiel Solano PA-C      methadone  80 mg Oral Daily Asad Diaz DO      methocarbamol  750 mg Oral Q6H PRN Magdiel Solano PA-C      multivitamin-minerals  1 tablet Oral Daily Magdiel Solano PA-C      OLANZapine  5 mg Intramuscular Q3H PRN Max 6/day Magdiel Solano PA-C      ondansetron  4 mg Intravenous Q6H PRN Magdiel Solano PA-C      thiamine  100 mg Oral Daily Magdiel Solano PA-C          Risks/Benefits of Treatment:     Risks, benefits, and possible side effects of medications explained to patient and patient verbalizes understanding and agreement for treatment.    Disposition: The patient does not currently meet criteria for inpatient psychiatric hospitalization. They deny thoughts or  plans for suicide and denies homicidal thoughts or intent. They are not unable to care for themselves due to a mental illness and/or acute psychosis. They are able to adequately participate in care planning with primary team. No criteria for an involuntary psychiatric commitment exists at this time.    Legal Status Recommendation:  N/A    Other/Medical Work Up and/or treatment modality recommendations: Alcohol detox per hospital protocol     Patient Caregiver/Family Education: N/A    Follow-up: Psychiatry will sign off on this patient for now    History of Present Illness      Christian Ball is a 70 y.o. male with a history of Bipolar Disorder who was admitted to the medical service on 3/14/2025 due to alcohol detoxification. Psychiatric consultation was requested due to depression and suicidal ideation.     At time of consultation patient is denying that he ever expressed suicidal ideation.  He also is stating he is only mildly depressed.  He added that these depressive symptoms have only been ongoing for the past 3 days.  Psychosocial stressors included alcohol use problems, limited support, and chronic mental illness.    On initial psychiatric consultation Christian reported that he was in the hospital for alcohol detoxification and was unsure why psychiatry had been consulted.  He expressed he did not feel he needed psychiatry to be seeing him.  He stated that he has been dealing with mild depression for the past couple of days but that he does not want psychiatric medications he reported that he was on Latuda for many years but stopped it a couple of years ago.  He also added he has not followed up with his outpatient psychiatrist, Dr. Kimball, and has no intentions to follow-up with her.  He also denied ever expressing suicidal ideation and believes there might have been some confusion by the staff.  The patient also denied ever having a history of manic episodes.  Patient reported that depressive symptoms he has  "experienced in the past couple of weeks is anhedonia and hopelessness.  He attributes that this to his alcohol use.  Patient stated he would only want psychiatric medications if \"it will get me off this methadone.\"  Patient denied all other psychiatric symptoms.    Psychiatric Review Of Systems:    Sleep changes: no  Appetite changes: no  Weight changes: no  Energy/anergy: no  Interest/pleasure/anhedonia: yes  Somatic symptoms: no  Anxiety/panic: no  Geno: Per chart review, patient has history of manic episodes but he is denying at this time  Guilty/hopeless: yes  Self injurious behavior/risky behavior: no  Suicidal ideation: no  Homicidal ideation: no  Auditory hallucinations: no   Visual hallucinations: no  Other hallucinations: no  Delusional thinking: no  Eating disorder history: no  Obsessive/compulsive symptoms: no    Historical Information     Past Psychiatric History:     Past Inpatient Psychiatric Treatment:   Multiple past inpatient psychiatric admissions   Past Outpatient Psychiatric Treatment:    Followed with Dr. Kimball at [facility] for many years but has not seen her in years.  Past Suicide Attempts: yes, by overdose  Past Violent Behavior: Denies  Past Psychiatric Medication Trials: Patient is unsure of past medication trials but most recently was taking Latuda    Substance Abuse History:    Social History       Tobacco History       Smoking Status  Former      Smokeless Tobacco Use  Never              Alcohol History       Alcohol Use Status  Yes              Drug Use       Drug Use Status  No              Sexual Activity       Sexually Active  Not Currently              Other Factors    Not Asked                 Additional Substance Use Detail       Questions Responses    Problems Due to Past Use of Alcohol? No    Substance Use Assessment Denies substance use within the past 12 months    Alcohol Use Frequency Denies use in past 12 months    Cannabis frequency Never used    Comment: Never used on " 9/15/2021     Heroin Frequency Denies use in past 12 months    Cocaine frequency Never used    Comment: Never used on 9/15/2021     Crack Cocaine Frequency Denies use in past 12 months    Methamphetamine Frequency Denies use in past 12 months    Narcotic Frequency Denies use in past 12 months    Benzodiazepine Frequency Denies use in past 12 months    Amphetamine frequency Denies use in past 12 months    Barbituate Frequency Denies use use in past 12 months    Inhalant frequency Never used    Comment: Never used on 9/15/2021     Hallucinogen frequency Never used    Comment: Never used on 9/15/2021     Ecstasy frequency Never used    Comment: Never used on 9/15/2021     Other drug frequency Never used    Comment: Never used on 9/15/2021     Opiate frequency Denies use in past 12 months    Not reviewed.          I have assessed this patient for substance use within the past 12 months    Alcohol use: drinks daily  Recreational drug use:   Cocaine: denies use  Heroin: denies use  Cannabis: denies use  Other drugs:   denies use  Longest clean time: unknown  History of Inpatient/Outpatient rehabilitation program: no  Smoking history: denies use  Use of caffeine: None    Family Psychiatric History:     Psychiatric Illness patient denies  Substance Abuse patient denies   Suicide Attempts patient denies    Social History:    Education: high school graduate  Learning Disabilities: none  Marital History: single  Children:  5  Living Arrangement: lives alone in an apartment  Occupational History:  Retired   Functioning Relationships: limited support system  Legal History: Denies   History: None    Traumatic History:   Abuse: Denies  Other Traumatic Events: Denies    Past Medical History:    History of Seizures: no  History of Head injury with loss of consciousness: no    Past Medical History:   Diagnosis Date    Anxiety     Depression     Diabetes mellitus (HCC)     Hypertension     PTSD  "(post-traumatic stress disorder)      Past Surgical History:   Procedure Laterality Date    BACK SURGERY      back fusion     Meds/Allergies      Objective :  Temp:  [97 °F (36.1 °C)-97.7 °F (36.5 °C)] 97.6 °F (36.4 °C)  HR:  [73-87] 73  BP: (133-162)/() 155/96  Resp:  [18] 18  SpO2:  [91 %-98 %] 95 %  O2 Device: Nasal cannula  Nasal Cannula O2 Flow Rate (L/min):  [2 L/min] 2 L/min    Temp:  [97 °F (36.1 °C)-97.7 °F (36.5 °C)] 97.6 °F (36.4 °C)  HR:  [73-87] 73  BP: (133-162)/() 155/96  Resp:  [18] 18  SpO2:  [91 %-98 %] 95 %  O2 Device: Nasal cannula  Nasal Cannula O2 Flow Rate (L/min):  [2 L/min] 2 L/min    Mental Status Evaluation:    Appearance:  age appropriate, marginal hygiene, dressed in hospital attire, looks stated age   Behavior:  guarded   Speech:  normal rate, normal volume, normal pitch   Mood:  \"Fine.\"   Affect:  normal range and intensity   Language: naming objects, repeating phrases   Thought Process:  organized, logical, linear, normal rate of thoughts   Thought Content:  no overt delusions   Perceptual Disturbances: no auditory hallucinations, no visual hallucinations, does not appear responding to internal stimuli   Risk Potential: Suicidal Ideation - None at present  Homicidal Ideations - None at present  Potential for Aggression - No   Sensorium:  oriented to person, place, and time/date   Memory:  recent and remote memory grossly intact   Consciousness:  alert and awake   Attention/Concentration: attention span and concentration are age appropriate   Intellect: average   Fund of Knowledge: awareness of current events: yes  past history: yes  vocabulary: yes   Insight:  fair   Judgment: fair   Muscle Strength:  Muscle Tone: normal  normal   Gait/Station: normal gait/station, normal balance   Motor Activity: no abnormal movements     Suicide Screening  ED Crisis Suicide Risk Assessment:      C-SSRS Screening (Nursing Assessment - recent):    C-SSRS Screening (Nursing Assessment - " since last contact):      Suicide Risk Assessment completed by the Consultant: The following ratings are based on assessment at the time of the interview. Demographic risk factors include: lowest socioeconomic class, male. Historical Risk Factors include: chronic psychiatric problems, history of suicide attempt, alcohol use. Recent Specific Risk Factors include: diagnosis of mood disorder, made suicidal threats, substance abuse. Protective Factors: no current suicidal ideation, being a parent, stable living environment. Weapons: none. The following steps have been taken to ensure weapons are properly secured: not applicable. Based on today's assessment, Christian presents the following risk of harm to self: minimal.  Suicide Risk Assessment: Not available on chart  Suicide Risk Assessment: Not obtained  Suicide Risk Assessment: Not applicable    The following interventions are recommended: N/A      Lab Results: I have reviewed the following results:  Results from the past 24 hours:   Recent Results (from the past 24 hours)   Fingerstick Glucose (POCT)    Collection Time: 03/15/25  4:00 PM   Result Value Ref Range    POC Glucose 92 65 - 140 mg/dl   Fingerstick Glucose (POCT)    Collection Time: 03/15/25  8:57 PM   Result Value Ref Range    POC Glucose 151 (H) 65 - 140 mg/dl   Comprehensive metabolic panel    Collection Time: 03/16/25  4:27 AM   Result Value Ref Range    Sodium 137 135 - 147 mmol/L    Potassium 3.6 3.5 - 5.3 mmol/L    Chloride 101 96 - 108 mmol/L    CO2 26 21 - 32 mmol/L    ANION GAP 10 4 - 13 mmol/L    BUN 11 5 - 25 mg/dL    Creatinine 0.65 0.60 - 1.30 mg/dL    Glucose 107 65 - 140 mg/dL    Calcium 8.7 8.4 - 10.2 mg/dL    AST 19 13 - 39 U/L    ALT 16 7 - 52 U/L    Alkaline Phosphatase 52 34 - 104 U/L    Total Protein 6.7 6.4 - 8.4 g/dL    Albumin 3.9 3.5 - 5.0 g/dL    Total Bilirubin 0.75 0.20 - 1.00 mg/dL    eGFR 97 ml/min/1.73sq m   Magnesium    Collection Time: 03/16/25  4:27 AM   Result Value Ref  Range    Magnesium 2.2 1.9 - 2.7 mg/dL   CBC    Collection Time: 03/16/25  4:27 AM   Result Value Ref Range    WBC 4.62 4.31 - 10.16 Thousand/uL    RBC 4.47 3.88 - 5.62 Million/uL    Hemoglobin 13.1 12.0 - 17.0 g/dL    Hematocrit 40.8 36.5 - 49.3 %    MCV 91 82 - 98 fL    MCH 29.3 26.8 - 34.3 pg    MCHC 32.1 31.4 - 37.4 g/dL    RDW 14.4 11.6 - 15.1 %    Platelets 177 149 - 390 Thousands/uL    MPV 10.4 8.9 - 12.7 fL   Fingerstick Glucose (POCT)    Collection Time: 03/16/25  7:15 AM   Result Value Ref Range    POC Glucose 119 65 - 140 mg/dl   Fingerstick Glucose (POCT)    Collection Time: 03/16/25 10:59 AM   Result Value Ref Range    POC Glucose 127 65 - 140 mg/dl       Imaging Results Review: I reviewed radiology reports from this admission including: xray(s).  Other Study Results Review: EKG was reviewed.     Code Status: Level 1 - Full Code  Advance Directive and Living Will:      Power of :    POLST:      Raymond Goff,  03/16/25

## 2025-03-16 NOTE — PLAN OF CARE
Problem: DISCHARGE PLANNING  Goal: Discharge to home or other facility with appropriate resources  Description: INTERVENTIONS:  - Identify barriers to discharge w/patient and caregiver  - Arrange for needed discharge resources and transportation as appropriate  - Identify discharge learning needs (meds, wound care, etc.)  - Arrange for interpretive services to assist at discharge as needed  - Refer to Case Management Department for coordinating discharge planning if the patient needs post-hospital services based on physician/advanced practitioner order or complex needs related to functional status, cognitive ability, or social support system  Outcome: Adequate for Discharge     Problem: SUBSTANCE USE/ABUSE  Goal: By discharge, will develop insight into their chemical dependency and sustain motivation to continue in recovery  Description: INTERVENTIONS:  - Attends all daily group sessions and scheduled AA groups  - Actively practices coping skills through participation in the therapeutic community and adherence to program rules  - Reviews and completes assignments from individual treatment plan  - Assist patient development of understanding of their personal cycle of addiction and relapse triggers  Outcome: Adequate for Discharge  Goal: By discharge, patient will have ongoing treatment plan addressing chemical dependency  Description: INTERVENTIONS:  - Assist patient with resources and/or appointments for ongoing recovery based living  Outcome: Adequate for Discharge

## 2025-03-16 NOTE — SOCIAL WORK
Pt to discharge today. Pt denies any withdrawal symptoms at this time. Pt to discharge to home and a Ly[employer] will  upon discharge. Pt to follow up with Rima MELTON on Monday March 17, 2025.      Discharge Address: [address] [place], [place] [address] [place] [address]  Phone Number:  [phone]

## 2025-03-16 NOTE — PROGRESS NOTES
Progress Note - Medical Toxicology   Name: Christian Ball 70 y.o. male I MRN: [id number]  Unit/Bed#: 5T [facility] 504-01 I Date of Admission: 3/14/2025   Date of Service: 3/16/2025 I Hospital Day: 2    Assessment & Plan  Opioid use disorder, severe, on maintenance therapy, dependence (HCC)  I called the methadone dose verification line and confirmed his dose.   He is on 82mg. Will order 80mg daily for simplicity while here in the hospital.   The Detox MAT nursing protocol for opioid withdrawal was ordered upon admission for unclear reasons. That protocol is no longer used here and the patient is not a candidate for buprenorphine since he is already on methadone.   Alcohol use disorder, severe, dependence (HCC)  CM involved for aftercare planning and linkage to ongoing AUD treatment after discharge.   He is not a candidate for naltrexone given methadone use.   Chronic low back pain    Benign essential hypertension    Type 2 diabetes mellitus with ophthalmic complication, without long-term current use of insulin (HCC)  Lab Results   Component Value Date    HGBA1C 5.7 (H) 03/14/2025       Recent Labs     03/15/25  1048 03/15/25  1600 03/15/25  2057 03/16/25  0715   POCGLU 123 92 151* 119       Blood Sugar Average: Last 72 hrs:  (P) [id number]    Alcohol withdrawal syndrome without complication (HCC)  Med tox signed off yesterday given that he was low risk after only 2 weeks of alcohol use. However, last night he apparently developed tremors. So, we were contacted. He was given 260mg of phenobarbital and SEWS was reordered.   He has not required any additional phenobarbital since.   Upon my exam, the tremors seem mostly volitional. When distracted with a second task, the tremors stop. Additionally, he has no tongue fasciculations.   I have discontinued SEWS. He is stable from a med tox standpoint for dispo per the primary team. We will sign off.     Reason for current consult: alcohol withdrawal     Subjective    No acute events overnight.     Objective :  Temp:  [97 °F (36.1 °C)-97.7 °F (36.5 °C)] 97.6 °F (36.4 °C)  HR:  [73-87] 73  BP: (133-162)/() 155/96  Resp:  [18] 18  SpO2:  [91 %-98 %] 95 %  O2 Device: Nasal cannula  Nasal Cannula O2 Flow Rate (L/min):  [2 L/min] 2 L/min      Intake/Output Summary (Last 24 hours) at 3/16/2025 0903  Last data filed at 3/16/2025 0845  Gross per 24 hour   Intake 2135 ml   Output 400 ml   Net 1735 ml       Physical Exam  Constitutional:       General: He is not in acute distress.     Appearance: Normal appearance.   Cardiovascular:      Rate and Rhythm: Normal rate.   Pulmonary:      Effort: Pulmonary effort is normal.   Neurological:      Mental Status: He is alert.      Comments: Volitional tremor. No tongue fasciculations           Lab Results: I have reviewed the following results:CBC/BMP:   .     03/16/25  0427   WBC 4.62   HGB 13.1   HCT 40.8      SODIUM 137   K 3.6      CO2 26   BUN 11   CREATININE 0.65   GLUC 107   MG 2.2        Imaging Results Review: No pertinent imaging studies reviewed.  Other Study Results Review: No additional pertinent studies reviewed.    Administrative Statements   I have spent a total time of 15 minutes in caring for this patient on the day of the visit/encounter including Documenting in the medical record, Obtaining or reviewing history  , and Communicating with other healthcare professionals .

## 2025-03-16 NOTE — ASSESSMENT & PLAN NOTE
Med tox signed off yesterday given that he was low risk after only 2 weeks of alcohol use. However, last night he apparently developed tremors. So, we were contacted. He was given 260mg of phenobarbital and SEWS was reordered.   He has not required any additional phenobarbital since.   Upon my exam, the tremors seem mostly volitional. When distracted with a second task, the tremors stop. Additionally, he has no tongue fasciculations.   I have discontinued SEWS. He is stable from a med tox standpoint for dispo per the primary team. We will sign off.

## 2025-03-16 NOTE — NURSING NOTE
IV removed, pt's belonging returned from locker and security. AVS reviewed and no questions at discharge. Pt has his own wheelchair and will be aided down to get lift, planned for 12:30

## 2025-03-16 NOTE — DISCHARGE INSTR - APPOINTMENTS
Monday, March 17, 2025    [place]  [facility] appointment    6am  appointment    Call:  [phone] if needed ([facility] afterhours number)

## 2025-03-16 NOTE — ASSESSMENT & PLAN NOTE
Lab Results   Component Value Date    HGBA1C 5.7 (H) 03/14/2025       Recent Labs     03/15/25  1048 03/15/25  1600 03/15/25  2057 03/16/25  0715   POCGLU 123 92 151* 119       Blood Sugar Average: Last 72 hrs:  (P) [id number]

## 2025-03-16 NOTE — PLAN OF CARE
Problem: PAIN - ADULT  Goal: Verbalizes/displays adequate comfort level or baseline comfort level  Description: Interventions:  - Encourage patient to monitor pain and request assistance  - Assess pain using appropriate pain scale  - Administer analgesics based on type and severity of pain and evaluate response  - Implement non-pharmacological measures as appropriate and evaluate response  - Consider cultural and social influences on pain and pain management  - Notify physician/advanced practitioner if interventions unsuccessful or patient reports new pain  Outcome: Adequate for Discharge       Problem: DISCHARGE PLANNING  Goal: Discharge to home or other facility with appropriate resources  Description: INTERVENTIONS:  - Identify barriers to discharge w/patient and caregiver  - Arrange for needed discharge resources and transportation as appropriate  - Identify discharge learning needs (meds, wound care, etc.)  - Arrange for interpretive services to assist at discharge as needed  - Refer to Case Management Department for coordinating discharge planning if the patient needs post-hospital services based on physician/advanced practitioner order or complex needs related to functional status, cognitive ability, or social support system  Outcome: Adequate for Discharge     Problem: SUBSTANCE USE/ABUSE  Goal: By discharge, will develop insight into their chemical dependency and sustain motivation to continue in recovery  Description: INTERVENTIONS:  - Attends all daily group sessions and scheduled AA groups  - Actively practices coping skills through participation in the therapeutic community and adherence to program rules  - Reviews and completes assignments from individual treatment plan  - Assist patient development of understanding of their personal cycle of addiction and relapse triggers  Outcome: Adequate for Discharge  Goal: By discharge, patient will have ongoing treatment plan addressing chemical  dependency  Description: INTERVENTIONS:  - Assist patient with resources and/or appointments for ongoing recovery based living  Outcome: Adequate for Discharge     Problem: Knowledge Deficit  Goal: Patient/family/caregiver demonstrates understanding of disease process, treatment plan, medications, and discharge instructions  Description: Complete learning assessment and assess knowledge base.  Interventions:  - Provide teaching at level of understanding  - Provide teaching via preferred learning methods  Outcome: Adequate for Discharge

## 2025-03-16 NOTE — ASSESSMENT & PLAN NOTE
Reportedly on 82 mg of methadone daily. Last dose yesterday. Patient desires to be weaned off of this.  Confirmed by toxicology who called methadone clinic  Mild withdrawal symptoms, will continue with methadone and monitor response  Patient is having tremors, however these are thought to be volitional

## 2025-03-17 NOTE — SOCIAL WORK
03/17/25 1104   Substance Abuse Addendum Details   History of Withdrawal Symptoms Other withdrawal symptoms (specify in comment)  (anxiety, tremors)   Medical Complications Alcohol withdrawal syndrome without complication (HCC)  Chronic low back pain  Benign essential hypertension  Type 2 diabetes mellitus with ophthalmic complication, without long-term current use of insulin (HCC)  Suicidal ideation  Opioid use disorder, severe, on maintenance therapy, dependence (HCC)  Alcohol use disorder, severe, dependence (HCC)   Sober Supports Pt has friends. Pt has family in [place], PCP, counselor at [facility]   Present Treatment PCP, counseling and methadone mainteance at [facility]   Substance Abuse Treatment Hx Past Tx, Inpatient;Past Tx, Outpatient;Past detox   ASAM Level & Criteria 4.0; pt has current w/d sxs of tremors and anxiety; medical dxes of  Alcohol withdrawal syndrome without complication (HCC)  Chronic low back pain  Benign essential hypertension  Type 2 diabetes mellitus with ophthalmic complication, without long-term current use of insulin (HCC)  Suicidal ideation  Opioid use disorder, severe, on maintenance therapy, dependence (HCC)  Alcohol use disorder, severe, dependence (HCC); pt has a lack of natural supports in the community and could benefit from higher LOC for RALPH. He has been diagnosed with Bipolar Disorder and reports being noncompliant with medication. Pt is in the contemplation stage of change.   Stage of Change   Stage of Change Contemplation     Pt is a 71 yo male admitted to [facility] for alcohol withdrawal. He also reported he hadn't had his prescribed Methadone in 2 days. He initially presented from [facility] ED. Pt was cooperative.     SUBSTANCE ABUSE    Stressor/Trigger    Alcohol withdrawal, SI without plan upon admit, Pt. reports he has not had his prescribed methadone in 2 days   UDS: not completed  Audit: not completed  PAWSS: 7  Nicotine: former  Ethanol: 189   Prior D&A  "treatment   Prior detox at [facility]    Pt reported he had been to IP treatment \"a long time ago\" at [facility].   AA/NA Meetings   Pt does not attend, is not interested.   Withdrawal  Symptoms   Anxiety, tremor   History of OD/blackouts or Withdrawal Seizures   Pt denies history of w/d seizure, blackouts or DTs.   MENTAL HEALTH INFORMATION    Hx of Mental Health Dx   Bipolar I Disorder    Pt reports a history pf suicide attempts in the 1970s.   Outpatient Mental Health Tx   Pt is currently seen as [facility] for methadone maintenance and counseling. Pt reports methadone clinic is not supportive of his decision to taper off Methadone.   Inpatient Hospitalizations (Mental Health)   Pt was hospitalized at least 6 times. Most recent 9/2021 for SI, depression at [facility]. He reported he had been binge drinking significant amounts prior to admission.    Currently on Methadone and would like to taper and discontinue.    Pt was taking Latuda in the past for MH.   Family History of Mental Health    Pt denies   Trauma History    Pt had 3 divorces in the past.   Current MH Symptoms   Pt endorses depression, but denies S/HI, denies plan. Denies AVH and is able to contract for safety.   Access to Firearms    Pt denies any access to firearms.   PHYSICAL HEALTH INFORMATION    Physical Health Conditions (Chronic)    Alcohol withdrawal syndrome without complication (HCC)   Chronic low back pain   Benign essential hypertension   Type 2 diabetes mellitus with ophthalmic complication, without long-term current use of insulin (HCC)   Suicidal ideation   Opioid use disorder, severe, on maintenance therapy, dependence (HCC)   Alcohol use disorder, severe, dependence (HCC)      PCP   Chloe Ren MD  [phone]      Insurance   [facility] MEDICARE ASSURED (primary)  [phone]   [facility]     [phone]      Preferred Pharmacy   [id number] " "[place] - [address]   [address] [place] [address]   Phone: [phone] Fax: [phone]      LEGAL ISSUES    Past or present legal issues   Pt denies   Probation/[place]   Pt denies   EMPLOYMENT/INCOME/NEEDS    Education   Pt completed 12th grade and technical school.   Employment Pt is retired      History   Pt reported he was in the [employer] and [employer], but declined VA insurance.   Spiritual/Caodaism Beliefs   Presybeterian   Transportation/Transport Home   Pt uses the bus or walks or uses the rides he gets through his insurance benefits.   DEMOGRAPHICS    Discharge Address   [address]  [place] [address]   Living Arrangement   Pt lives alone.   Can pt return home yes   External Supports     Pt has friends. Pt has family in [place].   Phone Number   [phone]   Email   [email or web address]   Marital Status/Children   , 5 adult children     Substance First use Last Use Frequency Amount Used How long Longest period of sobriety and when Method of use   THC            Heroin            Cocaine            ETOH   16 3/13/25 daily 16 oz vodka 2 weeks 2 years drink   Meth            Benzos            Other:              Pt and CM completed relapse prevention plan. Pt signed and received a copy. Pt stated his triggers are his depression and feeling like his methadone isn't adequate to manage his cravings. He stated warning signs that he may relapse are feelings of frustration and \"feeling like I'm getting fed up. Pt has 2 years past sobriety and has some insight into triggers. His coping skills are working out at the gym, eating a healthy diet and \"some Yarsani.\" He does have 2 years of sober time and has some insight into his triggers. He does have friends in the community, but his family lives in [place]. He has a counselor at [facility], but he could benefit from a higher level of care. He states he has bipolar disorder and has not been taking his " psychiatric medications regularly. Pt's goals for detox are to successfully complete medical withdrawal and to develop a discharge plan that includes relapse prevention education. Pt signed full ROIs for his friend Katt Encinas and his methadone clinic ([facility]). Pt declined [facility] for his PCP. Pt also signed an FLOR[facility] for [facility]. Pt did not sign a FLORINA for Medicare because upon admission, pt's chart stated he had Nexmos. When CM asked him to sign FLORINA, he stated he did not have [employer], but was unsure what coverage he had. Pt was hospitalized over the weekend and coverage could not be verified. However, CM made [facility] dept. aware. Pt is in the contemplation stage of change.

## 2025-03-17 NOTE — UTILIZATION REVIEW
NOTIFICATION OF ADMISSION DISCHARGE   This is a Notification of Discharge from [facility]. Please be advised that this patient has been discharge from our facility. Below you will find the admission and discharge date and time including the patient’s disposition.   UTILIZATION REVIEW CONTACT:  Dasia Brambila MA  Utilization   Network Utilization Review Department  Phone: [phone] x carefully listen to the prompts. All voicemails are confidential.  Email: [email or web address]     ADMISSION INFORMATION  PRESENTATION DATE: 3/14/2025  8:48 PM  OBERVATION ADMISSION DATE: N/A  INPATIENT ADMISSION DATE: 3/14/25  8:48 PM   DISCHARGE DATE: 3/16/2025 12:34 PM   DISPOSITION:Home/Self Care    Network Utilization Review Department  ATTENTION: Please call with any questions or concerns to [phone] and carefully listen to the prompts so that you are directed to the right person. All voicemails are confidential.   For Discharge needs, contact Care Management DC Support Team at [phone] opt. 2  Send all requests for admission clinical reviews, approved or denied determinations and any other requests to dedicated fax number below belonging to the campus where the patient is receiving treatment. List of dedicated fax numbers for the Facilities:  FACILITY NAME UR FAX NUMBER   ADMISSION DENIALS (Administrative/Medical Necessity) [phone]   DISCHARGE SUPPORT TEAM ([facility]) [phone]   PARENT CHILD HEALTH (Maternity/NICU/Pediatrics) [phone]   [facility] [phone]   [facility] [phone]   [facility] [phone]   [facility] [phone]   [facility] [phone]   [facility] [phone]   [facility] [phone]   [facility]  [phone]   [facility] [phone]   [facility] [phone]   [facility] [phone]   [facility] [phone]

## 2025-03-17 NOTE — UTILIZATION REVIEW
NOTIFICATION OF INPATIENT MEDICAL ADMISSION   AUTHORIZATION REQUEST   SERVICING FACILITY:   [facility]  [address] [place] [address]  Tax ID: [id number]  NPI: [id number] ATTENDING PROVIDER:  Attending Name and NPI#: Valentine Steve Md [[id number]]  Address: [address] [place] [id number]  Phone: [phone]     ADMISSION INFORMATION:  Place of Service: Inpatient [facility]  Place of Service Code: 21  Inpatient Admission Date/Time: 3/14/25  8:48 PM  Discharge Date/Time: 3/16/2025 12:34 PM  Admitting Diagnosis Code/Description:  S/P alcohol detoxification [Z92.89]     UTILIZATION REVIEW CONTACT:  Dasia Brambila Utilization   Network Utilization Review Department  Phone: [phone]  Fax [phone]  Email: [email or web address]  Contact for approvals/pending authorizations, clinical reviews, and discharge.     PHYSICIAN ADVISORY SERVICES:  Medical Necessity Denial & Phqx-gf-Ocsx Review  Phone: [phone]  Fax: [phone]  Email: [email or web address]     DISCHARGE SUPPORT TEAM:  For Patients Discharge Needs & Updates  Phone: [phone] opt. 2 Fax: [phone]  Email: [email or web address]

## 2025-05-26 ENCOUNTER — APPOINTMENT (EMERGENCY)
Dept: RADIOLOGY | Facility: HOSPITAL | Age: 70
DRG: 641 | End: 2025-05-26
Payer: MEDICARE

## 2025-05-26 ENCOUNTER — APPOINTMENT (EMERGENCY)
Dept: CT IMAGING | Facility: HOSPITAL | Age: 70
DRG: 641 | End: 2025-05-26
Payer: MEDICARE

## 2025-05-26 ENCOUNTER — HOSPITAL ENCOUNTER (EMERGENCY)
Facility: HOSPITAL | Age: 70
DRG: 641 | End: 2025-05-26
Attending: EMERGENCY MEDICINE | Admitting: EMERGENCY MEDICINE
Payer: MEDICARE

## 2025-05-26 ENCOUNTER — HOSPITAL ENCOUNTER (INPATIENT)
Facility: HOSPITAL | Age: 70
LOS: 2 days | Discharge: HOME | DRG: 641 | End: 2025-05-28
Attending: STUDENT IN AN ORGANIZED HEALTH CARE EDUCATION/TRAINING PROGRAM | Admitting: STUDENT IN AN ORGANIZED HEALTH CARE EDUCATION/TRAINING PROGRAM
Payer: MEDICARE

## 2025-05-26 VITALS
OXYGEN SATURATION: 95 % | HEART RATE: 84 BPM | RESPIRATION RATE: 18 BRPM | SYSTOLIC BLOOD PRESSURE: 147 MMHG | TEMPERATURE: 97.9 F | DIASTOLIC BLOOD PRESSURE: 88 MMHG | BODY MASS INDEX: 27.93 KG/M2 | WEIGHT: 205.91 LBS

## 2025-05-26 DIAGNOSIS — I10 BENIGN ESSENTIAL HYPERTENSION: ICD-10-CM

## 2025-05-26 DIAGNOSIS — K76.0 HEPATIC STEATOSIS: ICD-10-CM

## 2025-05-26 DIAGNOSIS — M25.561 CHRONIC PAIN OF RIGHT KNEE: Primary | ICD-10-CM

## 2025-05-26 DIAGNOSIS — F10.930 ALCOHOL WITHDRAWAL SYNDROME WITHOUT COMPLICATION (HCC): ICD-10-CM

## 2025-05-26 DIAGNOSIS — R93.89 ABNORMAL CT OF THE CHEST: ICD-10-CM

## 2025-05-26 DIAGNOSIS — F10.939 ALCOHOL WITHDRAWAL (HCC): ICD-10-CM

## 2025-05-26 DIAGNOSIS — G89.29 CHRONIC PAIN OF RIGHT KNEE: Primary | ICD-10-CM

## 2025-05-26 DIAGNOSIS — I27.20 PULMONARY HTN (HCC): ICD-10-CM

## 2025-05-26 DIAGNOSIS — F10.10 ALCOHOL ABUSE: Primary | ICD-10-CM

## 2025-05-26 PROBLEM — R07.9 CHEST PAIN: Status: ACTIVE | Noted: 2025-05-26

## 2025-05-26 PROBLEM — E87.6 HYPOKALEMIA: Status: ACTIVE | Noted: 2025-05-26

## 2025-05-26 PROBLEM — M25.569 CHRONIC KNEE PAIN: Status: ACTIVE | Noted: 2025-05-26

## 2025-05-26 LAB
2HR DELTA HS TROPONIN: 0 NG/L
ALBUMIN SERPL BCG-MCNC: 4.3 G/DL (ref 3.5–5)
ALP SERPL-CCNC: 56 U/L (ref 34–104)
ALT SERPL W P-5'-P-CCNC: 22 U/L (ref 7–52)
ANION GAP SERPL CALCULATED.3IONS-SCNC: 14 MMOL/L (ref 4–13)
APTT PPP: 26 SECONDS (ref 23–34)
AST SERPL W P-5'-P-CCNC: 28 U/L (ref 13–39)
ATRIAL RATE: 101 BPM
ATRIAL RATE: 91 BPM
BASOPHILS # BLD AUTO: 0.04 THOUSANDS/ÂΜL (ref 0–0.1)
BASOPHILS NFR BLD AUTO: 1 % (ref 0–1)
BILIRUB SERPL-MCNC: 1.96 MG/DL (ref 0.2–1)
BNP SERPL-MCNC: 18 PG/ML (ref 0–100)
BUN SERPL-MCNC: 10 MG/DL (ref 5–25)
CALCIUM SERPL-MCNC: 9.3 MG/DL (ref 8.4–10.2)
CARDIAC TROPONIN I PNL SERPL HS: 19 NG/L (ref ?–50)
CARDIAC TROPONIN I PNL SERPL HS: 19 NG/L (ref ?–50)
CHLORIDE SERPL-SCNC: 104 MMOL/L (ref 96–108)
CO2 SERPL-SCNC: 22 MMOL/L (ref 21–32)
CREAT SERPL-MCNC: 0.76 MG/DL (ref 0.6–1.3)
D DIMER PPP FEU-MCNC: 0.95 UG/ML FEU
EOSINOPHIL # BLD AUTO: 0.19 THOUSAND/ÂΜL (ref 0–0.61)
EOSINOPHIL NFR BLD AUTO: 4 % (ref 0–6)
ERYTHROCYTE [DISTWIDTH] IN BLOOD BY AUTOMATED COUNT: 14.8 % (ref 11.6–15.1)
ETHANOL SERPL-MCNC: 33 MG/DL
GFR SERPL CREATININE-BSD FRML MDRD: 91 ML/MIN/1.73SQ M
GLUCOSE SERPL-MCNC: 112 MG/DL (ref 65–140)
HCT VFR BLD AUTO: 39.7 % (ref 36.5–49.3)
HGB BLD-MCNC: 13.7 G/DL (ref 12–17)
IMM GRANULOCYTES # BLD AUTO: 0.01 THOUSAND/UL (ref 0–0.2)
IMM GRANULOCYTES NFR BLD AUTO: 0 % (ref 0–2)
INR PPP: 0.96 (ref 0.85–1.19)
LIPASE SERPL-CCNC: 27 U/L (ref 11–82)
LYMPHOCYTES # BLD AUTO: 1.2 THOUSANDS/ÂΜL (ref 0.6–4.47)
LYMPHOCYTES NFR BLD AUTO: 26 % (ref 14–44)
MAGNESIUM SERPL-MCNC: 1.8 MG/DL (ref 1.9–2.7)
MCH RBC QN AUTO: 29.1 PG (ref 26.8–34.3)
MCHC RBC AUTO-ENTMCNC: 34.5 G/DL (ref 31.4–37.4)
MCV RBC AUTO: 85 FL (ref 82–98)
MONOCYTES # BLD AUTO: 0.77 THOUSAND/ÂΜL (ref 0.17–1.22)
MONOCYTES NFR BLD AUTO: 17 % (ref 4–12)
NEUTROPHILS # BLD AUTO: 2.33 THOUSANDS/ÂΜL (ref 1.85–7.62)
NEUTS SEG NFR BLD AUTO: 52 % (ref 43–75)
NRBC BLD AUTO-RTO: 0 /100 WBCS
P AXIS: 76 DEGREES
P AXIS: 76 DEGREES
PLATELET # BLD AUTO: 243 THOUSANDS/UL (ref 149–390)
PMV BLD AUTO: 9.5 FL (ref 8.9–12.7)
POTASSIUM SERPL-SCNC: 3.4 MMOL/L (ref 3.5–5.3)
PR INTERVAL: 162 MS
PR INTERVAL: 170 MS
PROT SERPL-MCNC: 7.2 G/DL (ref 6.4–8.4)
PROTHROMBIN TIME: 13 SECONDS (ref 12.3–15)
QRS AXIS: -63 DEGREES
QRS AXIS: -66 DEGREES
QRSD INTERVAL: 130 MS
QRSD INTERVAL: 140 MS
QT INTERVAL: 334 MS
QT INTERVAL: 406 MS
QTC INTERVAL: 433 MS
QTC INTERVAL: 499 MS
RBC # BLD AUTO: 4.7 MILLION/UL (ref 3.88–5.62)
SODIUM SERPL-SCNC: 140 MMOL/L (ref 135–147)
T WAVE AXIS: -28 DEGREES
T WAVE AXIS: -32 DEGREES
TSH SERPL DL<=0.05 MIU/L-ACNC: 1.34 UIU/ML (ref 0.45–4.5)
VENTRICULAR RATE: 101 BPM
VENTRICULAR RATE: 91 BPM
WBC # BLD AUTO: 4.54 THOUSAND/UL (ref 4.31–10.16)

## 2025-05-26 PROCEDURE — 93010 ELECTROCARDIOGRAM REPORT: CPT | Performed by: INTERNAL MEDICINE

## 2025-05-26 PROCEDURE — 71275 CT ANGIOGRAPHY CHEST: CPT

## 2025-05-26 PROCEDURE — 73564 X-RAY EXAM KNEE 4 OR MORE: CPT

## 2025-05-26 PROCEDURE — 84484 ASSAY OF TROPONIN QUANT: CPT | Performed by: EMERGENCY MEDICINE

## 2025-05-26 PROCEDURE — 36415 COLL VENOUS BLD VENIPUNCTURE: CPT | Performed by: EMERGENCY MEDICINE

## 2025-05-26 PROCEDURE — 84443 ASSAY THYROID STIM HORMONE: CPT | Performed by: EMERGENCY MEDICINE

## 2025-05-26 PROCEDURE — 99285 EMERGENCY DEPT VISIT HI MDM: CPT | Performed by: EMERGENCY MEDICINE

## 2025-05-26 PROCEDURE — 83880 ASSAY OF NATRIURETIC PEPTIDE: CPT | Performed by: EMERGENCY MEDICINE

## 2025-05-26 PROCEDURE — 99223 1ST HOSP IP/OBS HIGH 75: CPT

## 2025-05-26 PROCEDURE — 99223 1ST HOSP IP/OBS HIGH 75: CPT | Performed by: EMERGENCY MEDICINE

## 2025-05-26 PROCEDURE — 71046 X-RAY EXAM CHEST 2 VIEWS: CPT

## 2025-05-26 PROCEDURE — 85025 COMPLETE CBC W/AUTO DIFF WBC: CPT | Performed by: EMERGENCY MEDICINE

## 2025-05-26 PROCEDURE — 93005 ELECTROCARDIOGRAM TRACING: CPT

## 2025-05-26 PROCEDURE — 83690 ASSAY OF LIPASE: CPT | Performed by: EMERGENCY MEDICINE

## 2025-05-26 PROCEDURE — 82077 ASSAY SPEC XCP UR&BREATH IA: CPT | Performed by: EMERGENCY MEDICINE

## 2025-05-26 PROCEDURE — 85379 FIBRIN DEGRADATION QUANT: CPT | Performed by: EMERGENCY MEDICINE

## 2025-05-26 PROCEDURE — 96361 HYDRATE IV INFUSION ADD-ON: CPT

## 2025-05-26 PROCEDURE — 85610 PROTHROMBIN TIME: CPT | Performed by: EMERGENCY MEDICINE

## 2025-05-26 PROCEDURE — 96375 TX/PRO/DX INJ NEW DRUG ADDON: CPT

## 2025-05-26 PROCEDURE — 85730 THROMBOPLASTIN TIME PARTIAL: CPT | Performed by: EMERGENCY MEDICINE

## 2025-05-26 PROCEDURE — 80053 COMPREHEN METABOLIC PANEL: CPT | Performed by: EMERGENCY MEDICINE

## 2025-05-26 PROCEDURE — 96365 THER/PROPH/DIAG IV INF INIT: CPT

## 2025-05-26 PROCEDURE — 99284 EMERGENCY DEPT VISIT MOD MDM: CPT

## 2025-05-26 PROCEDURE — 83735 ASSAY OF MAGNESIUM: CPT | Performed by: EMERGENCY MEDICINE

## 2025-05-26 RX ORDER — SODIUM CHLORIDE 9 MG/ML
150 INJECTION, SOLUTION INTRAVENOUS CONTINUOUS
Status: DISCONTINUED | OUTPATIENT
Start: 2025-05-26 | End: 2025-05-26 | Stop reason: HOSPADM

## 2025-05-26 RX ORDER — LANOLIN ALCOHOL/MO/W.PET/CERES
100 CREAM (GRAM) TOPICAL DAILY
Status: DISCONTINUED | OUTPATIENT
Start: 2025-05-26 | End: 2025-05-28 | Stop reason: HOSPADM

## 2025-05-26 RX ORDER — METHOCARBAMOL 500 MG/1
500 TABLET, FILM COATED ORAL EVERY 6 HOURS PRN
Status: DISCONTINUED | OUTPATIENT
Start: 2025-05-26 | End: 2025-05-28 | Stop reason: HOSPADM

## 2025-05-26 RX ORDER — ENOXAPARIN SODIUM 100 MG/ML
40 INJECTION SUBCUTANEOUS DAILY
Status: DISCONTINUED | OUTPATIENT
Start: 2025-05-27 | End: 2025-05-28 | Stop reason: HOSPADM

## 2025-05-26 RX ORDER — ONDANSETRON 2 MG/ML
4 INJECTION INTRAMUSCULAR; INTRAVENOUS ONCE
Status: COMPLETED | OUTPATIENT
Start: 2025-05-26 | End: 2025-05-26

## 2025-05-26 RX ORDER — ACETAMINOPHEN 325 MG/1
650 TABLET ORAL EVERY 6 HOURS PRN
Status: DISCONTINUED | OUTPATIENT
Start: 2025-05-26 | End: 2025-05-28 | Stop reason: HOSPADM

## 2025-05-26 RX ORDER — PHENOBARBITAL SODIUM 130 MG/ML
130 INJECTION, SOLUTION INTRAMUSCULAR; INTRAVENOUS ONCE
Status: COMPLETED | OUTPATIENT
Start: 2025-05-26 | End: 2025-05-26

## 2025-05-26 RX ORDER — KETOROLAC TROMETHAMINE 30 MG/ML
15 INJECTION, SOLUTION INTRAMUSCULAR; INTRAVENOUS ONCE
Status: COMPLETED | OUTPATIENT
Start: 2025-05-26 | End: 2025-05-26

## 2025-05-26 RX ORDER — DIAZEPAM 10 MG/2ML
10 INJECTION, SOLUTION INTRAMUSCULAR; INTRAVENOUS ONCE
Status: COMPLETED | OUTPATIENT
Start: 2025-05-26 | End: 2025-05-26

## 2025-05-26 RX ORDER — FOLIC ACID 1 MG/1
1 TABLET ORAL DAILY
Status: DISCONTINUED | OUTPATIENT
Start: 2025-05-26 | End: 2025-05-28 | Stop reason: HOSPADM

## 2025-05-26 RX ORDER — ONDANSETRON 2 MG/ML
4 INJECTION INTRAMUSCULAR; INTRAVENOUS EVERY 6 HOURS PRN
Status: DISCONTINUED | OUTPATIENT
Start: 2025-05-26 | End: 2025-05-28 | Stop reason: HOSPADM

## 2025-05-26 RX ORDER — MAGNESIUM SULFATE HEPTAHYDRATE 40 MG/ML
2 INJECTION, SOLUTION INTRAVENOUS ONCE
Status: COMPLETED | OUTPATIENT
Start: 2025-05-26 | End: 2025-05-26

## 2025-05-26 RX ORDER — DIAZEPAM 10 MG/2ML
2.5 INJECTION, SOLUTION INTRAMUSCULAR; INTRAVENOUS ONCE
Status: COMPLETED | OUTPATIENT
Start: 2025-05-26 | End: 2025-05-26

## 2025-05-26 RX ORDER — POTASSIUM CHLORIDE 1500 MG/1
40 TABLET, EXTENDED RELEASE ORAL ONCE
Status: COMPLETED | OUTPATIENT
Start: 2025-05-26 | End: 2025-05-26

## 2025-05-26 RX ORDER — KETOROLAC TROMETHAMINE 30 MG/ML
30 INJECTION, SOLUTION INTRAMUSCULAR; INTRAVENOUS EVERY 6 HOURS PRN
Status: DISCONTINUED | OUTPATIENT
Start: 2025-05-26 | End: 2025-05-28 | Stop reason: HOSPADM

## 2025-05-26 RX ORDER — TRAZODONE HYDROCHLORIDE 50 MG/1
50 TABLET ORAL
Status: DISCONTINUED | OUTPATIENT
Start: 2025-05-26 | End: 2025-05-28 | Stop reason: HOSPADM

## 2025-05-26 RX ORDER — SODIUM CHLORIDE 9 MG/ML
100 INJECTION, SOLUTION INTRAVENOUS CONTINUOUS
Status: DISCONTINUED | OUTPATIENT
Start: 2025-05-26 | End: 2025-05-27

## 2025-05-26 RX ADMIN — SODIUM CHLORIDE 150 ML/HR: 0.9 INJECTION, SOLUTION INTRAVENOUS at 14:00

## 2025-05-26 RX ADMIN — MAGNESIUM SULFATE HEPTAHYDRATE 2 G: 40 INJECTION, SOLUTION INTRAVENOUS at 11:36

## 2025-05-26 RX ADMIN — IOHEXOL 85 ML: 350 INJECTION, SOLUTION INTRAVENOUS at 11:15

## 2025-05-26 RX ADMIN — ONDANSETRON 4 MG: 2 INJECTION INTRAMUSCULAR; INTRAVENOUS at 10:10

## 2025-05-26 RX ADMIN — KETOROLAC TROMETHAMINE 30 MG: 30 INJECTION, SOLUTION INTRAMUSCULAR; INTRAVENOUS at 19:47

## 2025-05-26 RX ADMIN — FOLIC ACID 1 MG: 1 TABLET ORAL at 15:12

## 2025-05-26 RX ADMIN — PHENOBARBITAL SODIUM 130 MG: 130 INJECTION INTRAMUSCULAR; INTRAVENOUS at 23:47

## 2025-05-26 RX ADMIN — Medication 650 MG: at 15:12

## 2025-05-26 RX ADMIN — POTASSIUM CHLORIDE 40 MEQ: 1500 TABLET, EXTENDED RELEASE ORAL at 12:04

## 2025-05-26 RX ADMIN — SODIUM CHLORIDE 100 ML/HR: 0.9 INJECTION, SOLUTION INTRAVENOUS at 15:20

## 2025-05-26 RX ADMIN — THIAMINE HCL TAB 100 MG 100 MG: 100 TAB at 15:12

## 2025-05-26 RX ADMIN — MULTIPLE VITAMINS W/ MINERALS TAB 1 TABLET: TAB ORAL at 15:12

## 2025-05-26 RX ADMIN — DICLOFENAC SODIUM 2 G: 10 GEL TOPICAL at 21:00

## 2025-05-26 RX ADMIN — DIAZEPAM 10 MG: 5 INJECTION, SOLUTION INTRAMUSCULAR; INTRAVENOUS at 15:12

## 2025-05-26 RX ADMIN — ACETAMINOPHEN 650 MG: 325 TABLET, FILM COATED ORAL at 23:18

## 2025-05-26 RX ADMIN — DICLOFENAC SODIUM 2 G: 10 GEL TOPICAL at 17:12

## 2025-05-26 RX ADMIN — DIAZEPAM 2.5 MG: 5 INJECTION INTRAMUSCULAR; INTRAVENOUS at 10:26

## 2025-05-26 RX ADMIN — KETOROLAC TROMETHAMINE 15 MG: 30 INJECTION, SOLUTION INTRAMUSCULAR; INTRAVENOUS at 10:12

## 2025-05-26 RX ADMIN — METHOCARBAMOL 500 MG: 500 TABLET ORAL at 23:19

## 2025-05-26 RX ADMIN — SODIUM CHLORIDE 1000 ML: 0.9 INJECTION, SOLUTION INTRAVENOUS at 10:10

## 2025-05-26 RX ADMIN — PHENOBARBITAL SODIUM 130 MG: 130 INJECTION INTRAMUSCULAR; INTRAVENOUS at 21:00

## 2025-05-26 NOTE — ASSESSMENT & PLAN NOTE
Previously on gabapentin 300 mg TID and latuda 60 mg   He is currently not taking any psychiatric medication at this time   Evaluated by psychiatry during last admission who recommended outpatient follow up

## 2025-05-26 NOTE — PLAN OF CARE
Problem: PAIN - ADULT  Goal: Verbalizes/displays adequate comfort level or baseline comfort level  Description: Interventions:  - Encourage patient to monitor pain and request assistance  - Assess pain using appropriate pain scale  - Administer analgesics as ordered based on type and severity of pain and evaluate response  - Implement non-pharmacological measures as appropriate and evaluate response  - Consider cultural and social influences on pain and pain management  - Notify physician/advanced practitioner if interventions unsuccessful or patient reports new pain  - Educate patient on pain management process including their role and importance of  reporting pain   - Provide non-pharmacologic/complimentary pain relief interventions  Outcome: Progressing       Problem: SUBSTANCE USE/ABUSE  Goal: By discharge, will develop insight into their chemical dependency and sustain motivation to continue in recovery  Description: INTERVENTIONS:  - Assist patient development of understanding of their personal cycle of addiction and relapse triggers  Outcome: Progressing  Goal: By discharge, patient will have ongoing treatment plan addressing chemical dependency  Description: INTERVENTIONS:  - Assist patient with resources and/or appointments for ongoing recovery based living  Outcome: Progressing

## 2025-05-26 NOTE — CONSULTS
Consultation - Medical Toxicology   Name: Christian Ball 70 y.o. male I MRN: [id number]  Unit/Bed#: 5T DETOX 505-01 I Date of Admission: 5/26/2025   Date of Service: 5/26/2025 I Hospital Day: 0   Inpatient consult to Toxicology  Consult performed by: Asad Diaz DO  Consult ordered by: Ashley Spaulding PA-C      TeleConsultation - Medical Toxicology  Christian Ball 70 y.o. male MRN: [id number]  Unit/Bed#: 5T DETOX 505-01 Encounter: [id number]      REQUIRED DOCUMENTATION:     1. This service was provided via Telemedicine.  2. Provider located at Office.  3. TeleMed provider: Asad Diaz DO.  4. Identify all parties in room with patient during tele consult:  patient  5. After connecting through Im[employer], patient was identified by name and date of birth and assistant checked wristband.  Patient was then informed that this was a Telemedicine visit and that the exam was being conducted confidentially over secure lines. My office door was closed. No one else was in the room.  Patient acknowledged consent and understanding of privacy and security of the Telemedicine visit, and gave us permission to have the assistant stay in the room in order to assist with the history and to conduct the exam.  I informed the patient that I have reviewed their record in Epic and presented the opportunity for them to ask any questions regarding the visit today.  The patient agreed to participate.     Physician Requesting Evaluation: Neeraj Del Cid MD   Reason for Evaluation / Principal Problem: Alcohol withdrawal    Assessment & Plan  Alcohol withdrawal syndrome without complication (HCC)  Withdrawal adequately controlled with phenobarbital. Will continue SEWS for monitoring and additional dosing as indicated.   Continue supportive care, thiamine, folate.   Alcohol use disorder, severe, dependence (HCC)  AM/CRS involved for aftercare planning and linkage to ongoing AUD treatment after discharge  Will discuss naltrexone  "tomorrow.   Hypertension    Bipolar 1 disorder, depressed, severe (HCC)    Type 2 diabetes mellitus with ophthalmic complication, without long-term current use of insulin (HCC)  Lab Results   Component Value Date    HGBA1C 5.7 (H) 03/14/2025       No results for input(s): \"POCGLU\" in the last 72 hours.    Blood Sugar Average: Last 72 hrs:      Chronic knee pain    Hypokalemia    Chest pain    I have discussed the above management plan in detail with the primary service.         For further questions, please contact the medical  on call via SecureChat between 8am and 9pm. If between 9pm and 8am, please reach out to the Poison Center at [phone].     History of Present Illness   Christian Ball is a 70 y.o. year old male who presents with alcohol withdrawal. He was admitted in March and suffered recurrence of use a few weeks later. He has been drinking about a half gallon of whiskey every 2 days. He denies any other substance use currently. His last drink was early yesterday AM. He presented for detox today c/o anxiety, nausea, tremor. He is feeling a bit better after initial phenobarbital.     Review of Systems   Constitutional:  Negative for chills and fever.   HENT:  Negative for ear pain and sore throat.    Eyes:  Negative for pain and visual disturbance.   Respiratory:  Negative for cough and shortness of breath.    Cardiovascular:  Negative for chest pain and palpitations.   Gastrointestinal:  Positive for nausea. Negative for abdominal pain and vomiting.   Genitourinary:  Negative for dysuria and hematuria.   Musculoskeletal:  Negative for arthralgias and back pain.   Skin:  Negative for color change and rash.   Neurological:  Positive for tremors. Negative for seizures and syncope.   Psychiatric/Behavioral:  The patient is nervous/anxious.    All other systems reviewed and are negative.      Historical Information   Medical History Review: I have reviewed the patient's PMH, PSH, Social " History, Family History, Meds, and Allergies   Social History[1]  Family History[2]    Meds/Allergies   Prior to Admission medications    Medication Sig Start Date End Date Taking? Authorizing Provider   folic acid (FOLVITE) 1 mg tablet Take 1 tablet (1 mg total) by mouth daily 3/17/25  Yes Wilfrido Sahni PA-C   methocarbamol (ROBAXIN) 750 mg tablet Take 750 mg by mouth as needed in the morning and 750 mg as needed at noon and 750 mg as needed in the evening. 3/10/25  Yes Historical Provider, MD   thiamine 100 MG tablet Take 1 tablet (100 mg total) by mouth daily 3/17/25  Yes Wilfrido Sahni PA-C   amLODIPine (NORVASC) 10 mg tablet Take 1 tablet (10 mg total) by mouth daily 9/22/21 10/22/21  Ayana Mendez MD   gabapentin (NEURONTIN) 300 mg capsule Take 1 capsule (300 mg total) by mouth 3 (three) times a day for 10 days 9/21/21 10/1/21  Ayana Mendez MD   lisinopril (ZESTRIL) 20 mg tablet Take 20 mg by mouth daily  Patient not taking: Reported on 5/26/2025    Historical Provider, MD   lurasidone 60 MG TABS Take 1 tablet (60 mg total) by mouth daily with dinner 9/21/21 10/21/21  Ayana Mendez MD   rosuvastatin (CRESTOR) 10 MG tablet Take 10 mg by mouth daily  Patient not taking: Reported on 3/14/2025 3/26/24 3/26/25  Historical Provider, MD   traZODone (DESYREL) 50 mg tablet Take 1 tablet (50 mg total) by mouth daily at bedtime as needed for sleep 9/21/21 10/21/21  Ayana Mendez MD   Current Medications[3]   Allergies[4]    Objective :  Temp:  [97.8 °F (36.6 °C)-98.1 °F (36.7 °C)] 98.1 °F (36.7 °C)  HR:  [] 89  BP: (147-165)/() 148/89  Resp:  [18-20] 18  SpO2:  [95 %-97 %] 96 %  O2 Device: None (Room air)      Intake/Output Summary (Last 24 hours) at 5/26/2025 1616  Last data filed at 5/26/2025 1520  Gross per 24 hour   Intake --   Output 400 ml   Net -400 ml       Physical Exam  Constitutional:       General: He is not in acute distress.  HENT:      Mouth/Throat:      Mouth: Mucous membranes  are moist.     Eyes:      Extraocular Movements: Extraocular movements intact.       Cardiovascular:      Rate and Rhythm: Normal rate.   Pulmonary:      Effort: Pulmonary effort is normal.     Skin:     Coloration: Skin is not jaundiced.     Neurological:      Mental Status: He is alert and oriented to person, place, and time.      Comments: Mild intention tremor         Lab Results: I have reviewed the following results:  Results from last 7 days   Lab Units 05/26/25  1007   WBC Thousand/uL 4.54   HEMOGLOBIN g/dL 13.7   HEMATOCRIT % 39.7   PLATELETS Thousands/uL 243   SEGS PCT % 52   LYMPHO PCT % 26   MONO PCT % 17*   EOS PCT % 4      Results from last 7 days   Lab Units 05/26/25  1007   POTASSIUM mmol/L 3.4*   CHLORIDE mmol/L 104   CO2 mmol/L 22   BUN mg/dL 10   CREATININE mg/dL 0.76   CALCIUM mg/dL 9.3   ALBUMIN g/dL 4.3   ALK PHOS U/L 56   ALT U/L 22   AST U/L 28   MAGNESIUM mg/dL 1.8*      Results from last 7 days   Lab Units 05/26/25  1007   INR  0.96   PTT seconds 26         Results from last 7 days   Lab Units 05/26/25  1205 05/26/25  1007   HS TNI 0HR ng/L  --  19   HS TNI 2HR ng/L 19  --    BNP pg/mL  --  18          Results from last 7 days   Lab Units 05/26/25  1007   ETHANOL LVL mg/dL 33*     Imaging Results Review: No pertinent imaging studies reviewed.  Other Study Results Review: EKG was reviewed.     Administrative Statements   I have spent a total time of 45 minutes in caring for this patient on the day of the visit/encounter including Impressions, Documenting in the medical record, Obtaining or reviewing history  , and Communicating with other healthcare professionals .               [1]   Social History  Tobacco Use    Smoking status: Former    Smokeless tobacco: Never   Vaping Use    Vaping status: Never Used   Substance and Sexual Activity    Alcohol use: Yes    Drug use: No    Sexual activity: Not Currently   [2] No family history on file.  [3]   Current Facility-Administered Medications:      acetaminophen (TYLENOL) tablet 650 mg, 650 mg, Oral, Q6H PRN, Ashley Spaulding PA-C    Diclofenac Sodium (VOLTAREN) 1 % topical gel 2 g, 2 g, Topical, 4x Daily, [place] Lou-C    [START ON 5/27/2025] enoxaparin (LOVENOX) subcutaneous injection 40 mg, 40 mg, Subcutaneous, Daily, [place] Lou-C    folic acid (FOLVITE) tablet 1 mg, 1 mg, Oral, Daily, [place] Lou-C, 1 mg at 05/26/25 1512    ketorolac (TORADOL) injection 30 mg, 30 mg, Intravenous, Q6H PRN, [place] Lou-C    methocarbamol (ROBAXIN) tablet 500 mg, 500 mg, Oral, Q6H PRN, Ashley Spaulding PA-C    multivitamin-minerals (CENTRUM) tablet 1 tablet, 1 tablet, Oral, Daily, [place] Lou-C, 1 tablet at 05/26/25 1512    ondansetron (ZOFRAN) injection 4 mg, 4 mg, Intravenous, Q6H PRN, Ashley Spaulding PA-C    sodium chloride 0.9 % infusion, 100 mL/hr, Intravenous, Continuous, [place] Lou-C, Last Rate: 100 mL/hr at 05/26/25 1520, 100 mL/hr at 05/26/25 1520    thiamine tablet 100 mg, 100 mg, Oral, Daily, [place] Lou-C, 100 mg at 05/26/25 1512    traZODone (DESYREL) tablet 50 mg, 50 mg, Oral, HS PRN, [place] Lou-C  [4] No Known Allergies

## 2025-05-26 NOTE — ASSESSMENT & PLAN NOTE
Patient has been evaluated in the ED multiple times secondary to chronic right knee pain  Was evaluated in [facility] ED on 5/15 and 5/24   Did have fluid aspiration on 5/15   Referred to outpatient ortho and had appointment for 5/28   XR R knee: IMPRESSION: Degenerative changes of the right knee.   PT eval and treat   Will treat with tylenol and toradol as patient has history of opioid use disorder and this pain has been chronic since March   Patient would also benefit from pain management referral upon discharge as well as follow up with orthopedics.

## 2025-05-26 NOTE — SOCIAL WORK
05/28/25 1150   Substance Abuse Addendum Details   History of Withdrawal Symptoms DT's;Other withdrawal symptoms (specify in comment)  (anxiety, HTN, tachycardia, tremors, chest pain, nausea, arthralgias, back pain, headaches)   Medical Complications Alcohol withdrawal syndrome without complication (HCC)  Hypertension  Type 2 diabetes mellitus with ophthalmic complication, without long-term current use of insulin (HCC)  Alcohol use disorder, severe, dependence (HCC)  Chronic knee pain  Hypokalemia  Chest pain   Sober Supports friends and family in [place]   Present Treatment PCP   Substance Abuse Treatment Hx Past Tx, Inpatient;Past Tx, Outpatient;Past detox   ASAM Level & Criteria 4.0; pt has hx of DTs; current w/d sxs of anxiety, HTN, tachycardia, tremors, chest pain, nausea, arthralgias, back pain, headaches; medical dxes of Alcohol withdrawal syndrome without complication (HCC)  Hypertension  Type 2 diabetes mellitus with ophthalmic complication, without long-term current use of insulin (HCC)  Alcohol use disorder, severe, dependence (HCC)  Chronic knee pain  Hypokalemia  Chest pain ; lack of professional and natural supports in the community; pt has hx of untreated trauma and MH issues; some barriers to OP care including transportation   Stage of Change   Stage of Change Precontemplation          Pt is a 71 yo male who was admitted to [facility] for alcohol withdrawal. He initially presented  [facility]. Pt's name, date of birth, home address and telephone number were verified. Pt was informed of case management role and the purpose of the completion of intake with case management. Pt was cooperative.   SUBSTANCE ABUSE    Stressor/Trigger    Alcohol withdrawal; chronic pain and feelings of frustration   UDS: not completed  Audit: not completed  PAWSS: 6 Nicotine: former smoker (quit 2007)  Ethanol: 33   Prior D&A treatment   Prior detox at [facility]     Pt reported he had been to IP treatment  "\"a long time ago\" at [place].  OP treatment in the past at [facility] for individual counseling and methadone maintenance  [facility] detox 3/2025   AA/NA Meetings   Pt does not attend   Withdrawal  Symptoms    anxiety, HTN, tachycardia, tremors, chest pain, nausea, arthralgias, back pain, headaches   History of OD/blackouts or Withdrawal Seizures   History of DTs    Accidental overdose of cardiac medication 2023   MENTAL HEALTH INFORMATION    Hx of Mental Health Dx   Bipolar 1 disorder, depressed, severe    Outpatient Mental Health Tx   [facility] in recent past but discontinued services as pt no longer wanted to be on methadone maintenance.    Inpatient Hospitalizations (Mental Health)   Pt was hospitalized at least 6 times. Most recent 9/2021 for SI, depression at [facility].       Family History of Mental Health    denied   Trauma History    Pt had 3 divorces in the past.    Current MH Symptoms   Pt endorsed some anxiety, denied depression, SI, HI, AVH.     Reports a history of suicide attempts in the 1970s. Pt was taking Latuda in the past for MH.   Access to Firearms    Pt denied, says he is not permitted to own firearms.    PHYSICAL HEALTH INFORMATION    Physical Health Conditions (Chronic)   Alcohol withdrawal syndrome without complication (HCC)   Hypertension   Type 2 diabetes mellitus with ophthalmic complication, without long-term current use of insulin (HCC)   Alcohol use disorder, severe, dependence (HCC)   Chronic knee pain   Hypokalemia   Chest pain      PCP   Chloe Ren MD  [phone]     [facility] MEDICARE ASSURED  (primary)  [phone]   [facility]   [phone]    Preferred Pharmacy   [facility]/pharmacy #[id number] [place] - [address]   [address] [address]   Phone: [phone] Fax: [phone]      LEGAL ISSUES    Past or present legal issues     Pt pled guilty to defiant " "trespass in 1996 and Escape in 1998. He negotiated a plea for Escape in 2008 and was incarcerated. He pled guilty to false identification to law enforcement,  Access Device Used To Obt Or Att Obt Prop/Service, Theft By Deception, Receiving Stolen Property in 2004 and was incarcerated and placed on probation. He denies any current legal issues.      Probation/[place]   Pt denied   EMPLOYMENT/INCOME/NEEDS    Education   Pt completed 12th grade and technical school.    Employment   Retired    History   Pt reported he was in the [employer] and [employer], but declined VA insurance.    Spiritual/Voodoo Beliefs   Pt stated he definitely believes in God   Transportation/Transport Home   Pt uses [employer] system for transportation   DEMOGRAPHICS    Discharge Address   [address] [address]    Living Arrangement   Lives alone in [place] [facility]   Can pt return home yes     External Supports     Friends, family in [place]   Phone Number   [phone] (M)      Email   [email or web address]    Marital Status/Children   , 5 adult children     Substance First use Last Use Frequency Amount Used How long Longest period of sobriety and when Method of use   THC            Heroin            Cocaine            ETOH   16 5/25/25 daily 1 gallon whiskey  2 years  drink   Meth            Benzos            Other:            He has previously been on methadone for opioid use disorder. Recently has been filling intermittent opioid prescription secondary to knee pain.       Pt completed recovery plan with CM. He stated he was trying to stay sober, but chronic new pain was a trigger as well as feelings of frustration and feeling like \"things are going wrong.\" He had sober time in the past and stated that \"having someone to talk to\" was helpful. Pt no longer has a counselor at [facility] d/t wanting to discontinue his methadone. He did state maybe he should do that again, but told CM he wanted to handle his main " issues first. Pt was given list of OP counseling options and referred to pain management. He also has upcoming appts with PCP and CM contacted PCP to notify them of his hospitalization and for continuity of care. Pt's goals for detox are to successfully complete medical withdrawal and to develop a discharge plan that includes relapse prevention education. Pt signed release for PCP, [employer], [employer] (insurance). He declined emergency contact. Pt is in the pre-contemplation stage of change.     Social Drivers     05/28/25 1205   Financial Resource Strain   How hard is it for you to pay for the very basics like food, housing, medical care, and heating? Somewhat   Housing Stability   In the last 12 months, was there a time when you were not able to pay the mortgage or rent on time? N   In the past 12 months, how many times have you moved where you were living? 0   At any time in the past 12 months, were you homeless or living in a shelter (including now)? N   Transportation Needs   In the past 12 months, has lack of transportation kept you from medical appointments or from getting medications? yes   In the past 12 months, has lack of transportation kept you from meetings, work, or from getting things needed for daily living? Yes   Food Insecurity   Within the past 12 months, the food you bought just didn't last and you didn't have money to get more. Sometimes   Social Connections   In a typical week, how many times do you talk on the phone with family, friends, or neighbors? Once a week   How often do you get together with friends or relatives? Once   How often do you attend Confucianism or Worship services? Never   Do you belong to any clubs or organizations such as Confucianism groups, unions, fraternal or athletic groups, or school groups? No   How often do you attend meetings of the clubs or organizations you belong to? Never   Are you , , , , never , or living  with a partner?    Intimate Partner Violence   Within the last year, have you been afraid of your partner or ex-partner? No   Within the last year, have you been humiliated or emotionally abused in other ways by your partner or ex-partner? No   Within the last year, have you been kicked, hit, slapped, or otherwise physically hurt by your partner or ex-partner? No   Within the last year, have you been raped or forced to have any kind of sexual activity by your partner or ex-partner? No   Alcohol Use   Q1: How often do you have a drink containing alcohol? 4 or more ti   Q2: How many drinks containing alcohol do you have on a typical day when you are drinking? 10 or more   Q3: How often do you have six or more drinks on one occasion? Daily   Utilities   In the past 12 months has the electric, gas, oil, or water company threatened to shut off services in your home? No   Health Literacy   How often do you need to have someone help you when you read instructions, pamphlets, or other written material from your doctor or pharmacy? Never   Follow-Ups   We make community resources available to all of our patients to assist with everyday needs. We may be able to connect you with those resources. Would you be interested? Y   Would you be interested in assistance with any of these areas? If so, which ones? 9

## 2025-05-26 NOTE — ASSESSMENT & PLAN NOTE
Last drink: 5/25 AM   Ethanol lvl: 33 10:07 am  Received Valium 2.5 mg of valium   Toxicology consulted: appreciate recommendations   Follow SEWS protocol with symptom-triggered phenobarbital for medically-assisted alcohol withdrawal  Current symptoms include anxiety, HTN, tachycardia, tremos    SEWS score upon admission 13   Administer 650 mg phenobarbital + valium 10 mg IV   Telemetry and pulse oximetry monitoring   Continue to monitor vitals, symptoms

## 2025-05-26 NOTE — ASSESSMENT & PLAN NOTE
Withdrawal adequately controlled with phenobarbital. Will continue SEWS for monitoring and additional dosing as indicated.   Continue supportive care, thiamine, folate.

## 2025-05-26 NOTE — ED NOTES
Patient reports worsening withdrawal symptoms and is requesting meds. Provider to be made aware      Wanda Greer RN  05/26/25 8262

## 2025-05-26 NOTE — ASSESSMENT & PLAN NOTE
Endorsing right sided chest pain in the ED   EKG without acute ischemic changes   CXR: Prominence of the right pulmonary hilum. Further evaluation is recommended with CT of the chest.  D-Dimer 0.95   CTA: No pulmonary embolism. Dilated main pulmonary artery consistent with pulmonary arterial hypertension.Fatty liver.  Patient will require outpatient pulmonology appointment upon discharge

## 2025-05-26 NOTE — INCIDENTAL FINDINGS
The following findings require follow up:  Radiographic finding   Finding: MPRESSION:     No pulmonary embolism.     Dilated main pulmonary artery consistent with pulmonary arterial hypertension.     Fatty liver.   Follow up required: pulmonary   Follow up should be done within 1 month(s)    Please notify the following clinician to assist with the follow up:   Dr. Ren    Incidental finding results were discussed with the Patient by Hunter Perkins MD on 05/26/25.   They expressed understanding and all questions answered.

## 2025-05-26 NOTE — ASSESSMENT & PLAN NOTE
AM/CRS involved for aftercare planning and linkage to ongoing AUD treatment after discharge  Will discuss naltrexone tomorrow.

## 2025-05-26 NOTE — ASSESSMENT & PLAN NOTE
"Lab Results   Component Value Date    HGBA1C 5.7 (H) 03/14/2025       No results for input(s): \"POCGLU\" in the last 72 hours.    Blood Sugar Average: Last 72 hrs:      "

## 2025-05-26 NOTE — ASSESSMENT & PLAN NOTE
Pt with a h/o of daily alcohol use   Drinks a gallon of whiskey over 2 days   Previous admission to the [facility] March 2025   Denies H/o withdrawal seizures reports previous withdrawal complicated by: Delirium Tremens   Will have toxicology discuss naltrexone at this time with patient  He has previously been on methadone for opioid use disorder. Recently has been filling intermittent opioid prescription secondary to knee pain.   Withdrawal management as above  Initiate IVFs, IV thiamine, folic acid, and MVI  Consult to CRS   Consult case management/CRS for assistance with aftercare resources - pt interested in outpatient resources at this time

## 2025-05-26 NOTE — ASSESSMENT & PLAN NOTE
"Lab Results   Component Value Date    HGBA1C 5.7 (H) 03/14/2025       No results for input(s): \"POCGLU\" in the last 72 hours.    Blood Sugar Average: Last 72 hrs:  Currently on Ozempic 0.25 mg weekly   HGB A1C 5.7   Diabetic Diet   "

## 2025-05-26 NOTE — ASSESSMENT & PLAN NOTE
K+ 3.4   Oral supplementation given prior to arrival  Continue to monitor  Supplement as necessary

## 2025-05-26 NOTE — H&P
H&P - Hospitalist   Name: Christian Ball 70 y.o. male I MRN: [id number]  Unit/Bed#: 5T [facility] 505-01 I Date of Admission: (Not on file)   Date of Service: 5/26/2025 I Hospital Day: 0     Assessment & Plan  Alcohol withdrawal syndrome without complication (HCC)  Last drink: 5/25 AM   Ethanol lvl: 33 10:07 am  Received Valium 2.5 mg of valium   Toxicology consulted: appreciate recommendations   Follow SEWS protocol with symptom-triggered phenobarbital for medically-assisted alcohol withdrawal  Current symptoms include anxiety, HTN, tachycardia, tremos    SEWS score upon admission 13   Administer 650 mg phenobarbital + valium 10 mg IV   Telemetry and pulse oximetry monitoring   Continue to monitor vitals, symptoms      Alcohol use disorder, severe, dependence (HCC)  Pt with a h/o of daily alcohol use   Drinks a gallon of whiskey over 2 days   Previous admission to the [facility] March 2025   Denies H/o withdrawal seizures reports previous withdrawal complicated by: Delirium Tremens   Will have toxicology discuss naltrexone at this time with patient  He has previously been on methadone for opioid use disorder. Recently has been filling intermittent opioid prescription secondary to knee pain.   Withdrawal management as above  Initiate IVFs, IV thiamine, folic acid, and MVI  Consult to CRS   Consult case management/CRS for assistance with aftercare resources - pt interested in outpatient resources at this time   Hypertension  Patient with a history of HTN  Home medication regimen: amlodipine 10 mg, lisinopril 20 mg   BP hypertensive upon arrival to the unit in the setting of acute alcohol withdrawal and chronic HTN  Plan to resume anti-hypertensive therapy if BP remains high after resolution of acute withdrawal  Continue monitoring BP   Bipolar 1 disorder, depressed, severe (HCC)  Previously on gabapentin 300 mg TID and latuda 60 mg   He is currently not taking any psychiatric medication at this time  "  Evaluated by psychiatry during last admission who recommended outpatient follow up   Type 2 diabetes mellitus with ophthalmic complication, without long-term current use of insulin ([facility])  Lab Results   Component Value Date    HGBA1C 5.7 (H) 03/14/2025       No results for input(s): \"POCGLU\" in the last 72 hours.    Blood Sugar Average: Last 72 hrs:  Currently on Ozempic 0.25 mg weekly   HGB A1C 5.7   Diabetic Diet   Chronic knee pain  Patient has been evaluated in the ED multiple times secondary to chronic right knee pain  Was evaluated in [facility] ED on 5/15 and 5/24   Did have fluid aspiration on 5/15   Referred to outpatient ortho and had appointment for 5/28   XR R knee: IMPRESSION: Degenerative changes of the right knee.   PT eval and treat   Will treat with tylenol and toradol as patient has history of opioid use disorder and this pain has been chronic since March   Patient would also benefit from pain management referral upon discharge as well as follow up with orthopedics.   Hypokalemia  K+ 3.4   Oral supplementation given prior to arrival  Continue to monitor  Supplement as necessary   Chest pain  Endorsing right sided chest pain in the ED   EKG without acute ischemic changes   CXR: Prominence of the right pulmonary hilum. Further evaluation is recommended with CT of the chest.  D-Dimer 0.95   CTA: No pulmonary embolism. Dilated main pulmonary artery consistent with pulmonary arterial hypertension.Fatty liver.  Patient will require outpatient pulmonology appointment upon discharge       VTE Pharmacologic Prophylaxis:   Moderate Risk (Score 3-4) - Pharmacological DVT Prophylaxis Ordered: enoxaparin (Lovenox).  Code Status: Prior   Discussion with family: Patient declined call to .     Anticipated Length of Stay: Patient will be admitted on an inpatient basis with an anticipated length of stay of greater than 2 midnights secondary to alcohol withdrawal.    History of Present Illness   Chief " Complaint: alcohol withdrawal     Christian Ball is a 70 y.o. male with a PMH of DM, HTN, Bipolar 2, opioid use disorder previously on methadone, and alcohol use disorder who presents with alcohol withdrawal seeking detoxification. Patient initially presented to the [facility]  ED requesting alcohol detox with sx of tachycardia, HTN, tremors, and anxiety and was subsequently transferred to the [facility] withdrawal management unit for medical management of alcohol withdrawal.   Patient also endorsed chest pain in ED which was worked up and CTA was consistent with pulmonary hypertension. Patient reports that for the past several weeks he has been drinking 1/2 gallon of whiskey secondary to chronic right knee pain. Has had multiple previous ED encounters for same complaint. He has been scheduled to follow up with outpatient orthopedics on 5/28. He is interested in being detoxed prior to appointment.   Upon admission patient complains of headaches, tremors, and anxiety.         Review of Systems   Cardiovascular:  Positive for chest pain.   Gastrointestinal:  Positive for nausea.   Genitourinary: Negative.    Musculoskeletal:  Positive for arthralgias and back pain.   Skin: Negative.    Neurological:  Positive for tremors and headaches.   Psychiatric/Behavioral:  The patient is nervous/anxious.        Historical Information   Past Medical History[1]  Past Surgical History[2]  Social History[3]  E-Cigarette/Vaping    E-Cigarette Use Never User      E-Cigarette/Vaping Substances    Nicotine No     THC No     CBD No     Flavoring No     Other No     Unknown No      Family history non-contributory  Social History:  Marital Status:    Occupation: Retired  Patient Pre-hospital Living Situation: Home  Patient Pre-hospital Level of Mobility: walks with walker  Patient Pre-hospital Diet Restrictions: None     Meds/Allergies   I have reviewed home medications with patient personally.  Prior to Admission medications     Medication Sig Start Date End Date Taking? Authorizing Provider   amLODIPine (NORVASC) 10 mg tablet Take 1 tablet (10 mg total) by mouth daily 9/22/21 10/22/21  Ayana Mendez MD   folic acid (FOLVITE) 1 mg tablet Take 1 tablet (1 mg total) by mouth daily 3/17/25   Wilfrido Sahni PA-C   gabapentin (NEURONTIN) 300 mg capsule Take 1 capsule (300 mg total) by mouth 3 (three) times a day for 10 days 9/21/21 10/1/21  Ayana Mendez MD   lisinopril (ZESTRIL) 20 mg tablet Take 20 mg by mouth daily    Historical Provider, MD   lurasidone 60 MG TABS Take 1 tablet (60 mg total) by mouth daily with dinner 9/21/21 10/21/21  Ayana Mendez MD   methocarbamol (ROBAXIN) 750 mg tablet Take 750 mg by mouth Three times daily as needed 3/10/25   Historical Provider, MD   rosuvastatin (CRESTOR) 10 MG tablet Take 10 mg by mouth daily  Patient not taking: Reported on 3/14/2025 3/26/24 3/26/25  Historical Provider, MD   thiamine 100 MG tablet Take 1 tablet (100 mg total) by mouth daily 3/17/25   Wilfrido Sahni PA-C   traZODone (DESYREL) 50 mg tablet Take 1 tablet (50 mg total) by mouth daily at bedtime as needed for sleep 9/21/21 10/21/21  Ayana Mendez MD     No Known Allergies    Objective :  Temp:  [97.9 °F (36.6 °C)] 97.9 °F (36.6 °C)  HR:  [] 98  BP: (153-165)/(82-97) 160/83  Resp:  [18-20] 18  SpO2:  [95 %-97 %] 95 %  O2 Device: None (Room air)    Physical Exam  Vitals reviewed.   Constitutional:       General: He is not in acute distress.     Appearance: He is diaphoretic.     Eyes:      General: No scleral icterus.      Cardiovascular:      Rate and Rhythm: Regular rhythm. Tachycardia present.   Pulmonary:      Effort: No respiratory distress.      Breath sounds: Normal breath sounds.   Abdominal:      Palpations: Abdomen is soft.      Tenderness: There is no abdominal tenderness.     Musculoskeletal:      Right knee: No deformity, effusion or erythema. Decreased range of motion. Tenderness present.      Left  knee: No erythema.     Skin:     Coloration: Skin is not jaundiced.     Neurological:      Mental Status: He is alert and oriented to person, place, and time.     Psychiatric:         Mood and Affect: Mood is anxious.          Lines/Drains:            Lab Results: I have reviewed the following results:  Results from last 7 days   Lab Units 05/26/25  1007   WBC Thousand/uL 4.54   HEMOGLOBIN g/dL 13.7   HEMATOCRIT % 39.7   PLATELETS Thousands/uL 243   SEGS PCT % 52   LYMPHO PCT % 26   MONO PCT % 17*   EOS PCT % 4     Results from last 7 days   Lab Units 05/26/25  1007   SODIUM mmol/L 140   POTASSIUM mmol/L 3.4*   CHLORIDE mmol/L 104   CO2 mmol/L 22   BUN mg/dL 10   CREATININE mg/dL 0.76   ANION GAP mmol/L 14*   CALCIUM mg/dL 9.3   ALBUMIN g/dL 4.3   TOTAL BILIRUBIN mg/dL 1.96*   ALK PHOS U/L 56   ALT U/L 22   AST U/L 28   GLUCOSE RANDOM mg/dL 112     Results from last 7 days   Lab Units 05/26/25  1007   INR  0.96         Lab Results   Component Value Date    HGBA1C 5.7 (H) 03/14/2025    HGBA1C 6.6 (H) 09/15/2021    HGBA1C 7.1 (H) 02/25/2021           Imaging Results Review: I reviewed radiology reports from this admission including: CTA .  Other Study Results Review: EKG was reviewed.     Administrative Statements       ** Please Note: This note has been constructed using a voice recognition system. **         [1]   Past Medical History:  Diagnosis Date    Anxiety     Depression     Diabetes mellitus (HCC)     Hypertension     PTSD (post-traumatic stress disorder)    [2]   Past Surgical History:  Procedure Laterality Date    BACK SURGERY      back fusion   [3]   Social History  Tobacco Use    Smoking status: Former    Smokeless tobacco: Never   Vaping Use    Vaping status: Never Used   Substance and Sexual Activity    Alcohol use: Yes    Drug use: No    Sexual activity: Not Currently

## 2025-05-26 NOTE — EMTALA/ACUTE CARE TRANSFER
[facility] EMERGENCY DEPARTMENT  [address] [facility] [address]  Dept: [phone]      EMTALA TRANSFER CONSENT    NAME Christian Ball                                         1955                              MRN [id number]    I have been informed of my rights regarding examination, treatment, and transfer   by Dr. Hunter Gibbs*    Benefits: Continuity of care    Risks: Potential for delay in receiving treatment, Potential deterioration of medical condition, Loss of IV, Increased discomfort during transfer      Consent for Transfer:  I acknowledge that my medical condition has been evaluated and explained to me by the emergency department physician or other qualified medical person and/or my attending physician, who has recommended that I be transferred to the service of  Accepting Physician: Dr. Del Cid at Accepting Facility Name, City & State : [place]. The above potential benefits of such transfer, the potential risks associated with such transfer, and the probable risks of not being transferred have been explained to me, and I fully understand them.  The doctor has explained that, in my case, the benefits of transfer outweigh the risks.  I agree to be transferred.    I authorize the performance of emergency medical procedures and treatments upon me in both transit and upon arrival at the receiving facility.  Additionally, I authorize the release of any and all medical records to the receiving facility and request they be transported with me, if possible.  I understand that the safest mode of transportation during a medical emergency is an ambulance and that the Hospital advocates the use of this mode of transport. Risks of traveling to the receiving facility by car, including absence of medical control, life sustaining equipment, such as oxygen, and medical personnel has been explained to me and I fully understand them.    (JOSE M CORRECT BOX BELOW)  [  ]  I consent to  the stated transfer and to be transported by ambulance/helicopter.  [  ]  I consent to the stated transfer, but refuse transportation by ambulance and accept full responsibility for my transportation by car.  I understand the risks of non-ambulance transfers and I exonerate the Hospital and its staff from any deterioration in my condition that results from this refusal.    X___________________________________________    DATE  25  TIME________  Signature of patient or legally responsible individual signing on patient behalf           RELATIONSHIP TO PATIENT_________________________          Provider Certification    NAME Christian Ball                                         1955                              MRN [id number]    A medical screening exam was performed on the above named patient.  Based on the examination:    Condition Necessitating Transfer The primary encounter diagnosis was Alcohol abuse. Diagnoses of Alcohol withdrawal (HCC), Abnormal CT of the chest, Pulmonary HTN (HCC), and Hepatic steatosis were also pertinent to this visit.    Patient Condition: The patient has been stabilized such that within reasonable medical probability, no material deterioration of the patient condition or the condition of the unborn child(ann marie) is likely to result from the transfer    Reason for Transfer: Level of Care needed not available at this facility    Transfer Requirements: Facility [facility]   Space available and qualified personnel available for treatment as acknowledged by    Agreed to accept transfer and to provide appropriate medical treatment as acknowledged by       Dr. Del Cid  Appropriate medical records of the examination and treatment of the patient are provided at the time of transfer   STAFF INITIAL WHEN COMPLETED _______  Transfer will be performed by qualified personnel from    and appropriate transfer equipment as required, including the use of necessary and appropriate life support  measures.    Provider Certification: I have examined the patient and explained the following risks and benefits of being transferred/refusing transfer to the patient/family:  General risk, such as traffic hazards, adverse weather conditions, rough terrain or turbulence, possible failure of equipment (including vehicle or aircraft), or consequences of actions of persons outside the control of the transport personnel, Risk of worsening condition, Unanticipated needs of medical equipment and personnel during transport, The possibility of a transport vehicle being unavailable      Based on these reasonable risks and benefits to the patient and/or the unborn child(ann marie), and based upon the information available at the time of the patient’s examination, I certify that the medical benefits reasonably to be expected from the provision of appropriate medical treatments at another medical facility outweigh the increasing risks, if any, to the individual’s medical condition, and in the case of labor to the unborn child, from effecting the transfer.    X____________________________________________ DATE 05/26/25        TIME_______      ORIGINAL - SEND TO MEDICAL RECORDS   COPY - SEND WITH PATIENT DURING TRANSFER

## 2025-05-26 NOTE — ASSESSMENT & PLAN NOTE
Patient with a history of HTN  Home medication regimen: amlodipine 10 mg, lisinopril 20 mg   BP hypertensive upon arrival to the unit in the setting of acute alcohol withdrawal and chronic HTN  Plan to resume anti-hypertensive therapy if BP remains high after resolution of acute withdrawal  Continue monitoring BP

## 2025-05-26 NOTE — ED PROVIDER NOTES
Time reflects when diagnosis was documented in both MDM as applicable and the Disposition within this note       Time User Action Codes Description Comment    5/26/2025 12:24 PM Hunter Perkins Add [F10.10] Alcohol abuse     5/26/2025 12:25 PM Hunter Perkins Add [F10.939] Alcohol withdrawal (HCC)     5/26/2025 12:25 PM Hunter Perkins Add [R93.89] Abnormal CT of the chest     5/26/2025 12:26 PM Hunter Perkins Add [I27.20] Pulmonary HTN (HCC)     5/26/2025 12:47 PM Hunter Perkins Add [K76.0] Hepatic steatosis           ED Disposition       ED Disposition   Transfer to Another Facility-In Network    Condition   --    Date/Time   Mon May 26, 2025 12:46 PM    Comment   Christian Ball should be transferred out to [facility] detox unit.               Assessment & Plan       Medical Decision Making  Amount and/or Complexity of Data Reviewed  Labs: ordered. Decision-making details documented in ED Course.  Radiology: ordered and independent interpretation performed.    Risk  Prescription drug management.    Patient with a history of alcohol abuse with alcohol withdrawal, elevated CIWA at risk for worsening condition.  Case was discussed with detox unit they excepted in transfer to Dr. Del Cid's service.  While here he was hydrated.  Medical workup reveals no fever to suggest any type of infection.  He did have an abnormal chest x-ray with an elevated D-dimer CT was ordered to rule out the possibility of PE and to evaluate the mediastinum.  There was no evidence of aortic dissection.  He may have evidence of pulmonary hypertension will need referral to pulmonary.  He had no evidence of any coronary artery ischemia.  He was given a dose of Valium for the alcohol withdrawal as well as magnesium and some potassium for his hypokalemia.  As far as his knee pain is concerned he has no evidence of fracture or dislocation and will need follow-up with orthopedics.  The patient tells me he  may need knee replacement surgery for that knee.  He has no signs of infection in that thing at the moment does not need arthrocentesis.  ED Course as of 05/26/25 1537   Mon May 26, 2025   1023 CBC and differential(!)  Normal white count and no fever to suggest a severe infection.  He is not anemic.  Platelets are normal.   1056 D-Dimer, Quant(!): 0.95  Elevated D-dimer, abnormal chest x-ray reading from radiology with abnormality in the right hilum.  Doubt dissection.  Ordering chest CT PE study for further evaluation as he will need to be medically cleared before he goes over to the detox unit.   1057 Comprehensive metabolic panel(!)  Mild hypokalemia can replace orally but will give IV magnesium.   1057 ETHANOL(!): 33  Alcohol level of 33 and patient told me his last drink was Sunday a.m. at 2 in the morning so not sure if he is being completely honest.   1245 Discussed with detox unit and excepted over at [facility] under Dr. Del Cid       Medications   ketorolac (TORADOL) injection 15 mg (15 mg Intravenous Given 5/26/25 1012)   sodium chloride 0.9 % bolus 1,000 mL (0 mL Intravenous Stopped 5/26/25 1317)   diazepam (VALIUM) injection 2.5 mg (2.5 mg Intravenous Given 5/26/25 1026)   ondansetron (ZOFRAN) injection 4 mg (4 mg Intravenous Given 5/26/25 1010)   potassium chloride (Klor-Con M20) CR tablet 40 mEq (40 mEq Oral Given 5/26/25 1204)   magnesium sulfate 2 g/50 mL IVPB (premix) 2 g (0 g Intravenous Stopped 5/26/25 1202)   iohexol (OMNIPAQUE) 350 MG/ML injection (MULTI-DOSE) 85 mL (85 mL Intravenous Given 5/26/25 1115)       ED Risk Strat Scores   HEART Risk Score      Flowsheet Row Most Recent Value   Heart Score Risk Calculator    History 0 Filed at: 05/26/2025 1230   ECG 0 Filed at: 05/26/2025 1230   Age 2 Filed at: 05/26/2025 1230   Risk Factors 1 Filed at: 05/26/2025 1230   Troponin 1 Filed at: 05/26/2025 1230   HEART Score 4 Filed at: 05/26/2025 1230          HEART Risk Score      Flowsheet Row Most  Recent Value   Heart Score Risk Calculator    History 0 Filed at: 05/26/2025 1230   ECG 0 Filed at: 05/26/2025 1230   Age 2 Filed at: 05/26/2025 1230   Risk Factors 1 Filed at: 05/26/2025 1230   Troponin 1 Filed at: 05/26/2025 1230   HEART Score 4 Filed at: 05/26/2025 1230                   CIWA-Ar Score       Row Name 05/26/25 1319 05/26/25 1202 05/26/25 0954       CIWA-Ar    Blood Pressure 160/83 153/82 --    Pulse 98 97 --    Nausea and Vomiting 2 0 3    Tactile Disturbances 0 0 2    Tremor 4 2 4    Auditory Disturbances 0 0 0    Paroxysmal Sweats 0 0 0    Visual Disturbances 0 -- 0    Anxiety 4 0 4    Headache, Fullness in Head 0 0 0    Agitation 0 0 0    Orientation and Clouding of Sensorium 0 0 0    CIWA-Ar Total 10 -- 13      Row Name 05/26/25 0947             CIWA-Ar    Blood Pressure 165/97      Pulse 115      Nausea and Vomiting 3      Tactile Disturbances 0      Tremor 4      Auditory Disturbances 0      Paroxysmal Sweats 0      Visual Disturbances 0      Anxiety 4      Headache, Fullness in Head 0      Agitation 0      Orientation and Clouding of Sensorium 0      CIWA-Ar Total 11                      No data recorded    PERC Rule for PE      Flowsheet Row Most Recent Value   PERC Rule for PE    Age >=50 1 Filed at: 05/26/2025 1054   HR >=100 --   O2 Sat on room air < 95% --   History of PE or DVT --   Recent trauma or surgery --   Hemoptysis --   Exogenous estrogen --   Unilateral leg swelling --   PERC Rule for PE Results 1 Filed at: 05/26/2025 1054            SBIRT 20yo+      Flowsheet Row Most Recent Value   Initial Alcohol Screen: US AUDIT-C     1. How often do you have a drink containing alcohol? 6 Filed at: 05/26/2025 1031   2. How many drinks containing alcohol do you have on a typical day you are drinking?  6 Filed at: 05/26/2025 1031   3a. Male UNDER 65: How often do you have five or more drinks on one occasion? 6 Filed at: 05/26/2025 1031   3b. FEMALE Any Age, or MALE 65+: How often do you have  4 or more drinks on one occassion? 0 Filed at: 05/26/2025 1031   Audit-C Score 18 Filed at: 05/26/2025 1031   Full Alcohol Screen: US AUDIT    4. How often during the last year have you found that you were not able to stop drinking once you had started? 4 Filed at: 05/26/2025 1032   5. How often during past year have you failed to do what was normally expected of you because of drinking?  3 Filed at: 05/26/2025 1032   6. How often in past year have you needed a first drink in the morning to get yourself going after a heavy drinking session?  4 Filed at: 05/26/2025 1032   7. How often in past year have you had feeling of guilt or remorse after drinking?  3 Filed at: 05/26/2025 1032   8. How often in past year have you been unable to remember what happened night before because you had been drinking?  1 Filed at: 05/26/2025 1032   9. Have you or someone else been injured as a result of your drinking?  0 Filed at: 05/26/2025 1032   10. Has a relative, friend, doctor or other health worker been concerned about your drinking and suggested you cut down?  4 Filed at: 05/26/2025 1032            Wells' Criteria for PE      Flowsheet Row Most Recent Value   Wells' Criteria for PE    Clinical signs and symptoms of DVT 0 Filed at: 05/26/2025 1054   PE is primary diagnosis or equally likely 3 Filed at: 05/26/2025 1054   HR >100 1.5 Filed at: 05/26/2025 1054   Immobilization at least 3 days or Surgery in the previous 4 weeks 0 Filed at: 05/26/2025 1054   Previous, objectively diagnosed PE or DVT 0 Filed at: 05/26/2025 1054   Hemoptysis 0 Filed at: 05/26/2025 1054   Malignancy with treatment within 6 months or palliative 0 Filed at: 05/26/2025 1054   Wells' Criteria Total 4.5 Filed at: 05/26/2025 1054                        History of Present Illness       Chief Complaint   Patient presents with    Detox Evaluation     Patient brought by EMS from home. Increased use of alcohol over past several weeks due to coping with knee injury.  Approximately 1/2 gallon of etoh daily. Last drink yesterday around 2am.     Knee Pain     R knee pain for several weeks. Recently seen for same at other facility.        Past Medical History[1]   Past Surgical History[2]   Family History[3]   Social History[4]   E-Cigarette/Vaping    E-Cigarette Use Never User       E-Cigarette/Vaping Substances    Nicotine No     THC No     CBD No     Flavoring No     Other No     Unknown No       I have reviewed and agree with the history as documented.       Detox Evaluation  Knee Pain  Patient coming in for alcohol detox.  Has had a drink since Sunday morning at 2 AM.  He drinks at least half a gallon of whiskey daily.  He is nauseous without vomiting.  Has had DTs in the past.  Has not been to rehab in a while.  No fevers.  Really has not been eating as he has no appetite.  Denies diaphoresis.  No abdominal pain.  He does complain of an intermittent sharp left-sided chest pain that comes and goes.  No history of DVT/PE.  He has no specific abdominal pain or diarrhea.  He is urinating normally.  He does complain of right knee pain which has been ongoing for some time without any new trauma.  He is scheduled to see an orthopedic doctor sometime next week as he may need a knee replacement.    Alcohol withdrawal with elevated CIWA.  Valium.  Check lab studies electrolytes.  Will give IV fluids.  Discussed with detox.  May need transfer over to the detox unit.  Will check knee x-ray although without any trauma suspect to find some arthritis but will rule out any type of fracture.  There does not appear to be any infectious etiology of his knee.  He does not have a fever to suggest any infectious etiology of his current symptoms.  Patient has not been eating and drinking most likely dehydrated will give IV fluids as well as antiemetics.  Will treat with Valium given his elevated CIWA score.    Review of Systems        Objective       ED Triage Vitals   Temperature Pulse Blood  Pressure Respirations SpO2 Patient Position - Orthostatic VS   05/26/25 0955 05/26/25 0943 05/26/25 0943 05/26/25 0943 05/26/25 0943 05/26/25 0943   97.9 °F (36.6 °C) (!) 115 165/97 20 97 % Sitting      Temp Source Heart Rate Source BP Location FiO2 (%) Pain Score    05/26/25 0955 05/26/25 0943 05/26/25 0943 -- 05/26/25 1012    Oral Monitor Left arm  8      Vitals      Date and Time Temp Pulse SpO2 Resp BP Pain Score FACES Pain Rating User   05/26/25 1415 -- 84 95 % 18 147/88 4 -- TMS   05/26/25 1319 -- 98 -- -- 160/83 -- -- TMS   05/26/25 1315 -- 92 95 % 18 160/83 -- -- TMS   05/26/25 1215 -- 98 96 % 18 153/82 -- -- SS   05/26/25 1202 -- 97 -- -- 153/82 -- -- TMS   05/26/25 1012 -- -- -- -- -- 8 -- TMS   05/26/25 0955 97.9 °F (36.6 °C) -- -- -- -- -- -- TMS   05/26/25 0947 -- 115 -- -- 165/97 -- -- TMS   05/26/25 0943 -- 115 97 % 20 165/97 -- -- TMS            Physical Exam  Vitals and nursing note reviewed.   Constitutional:       General: He is not in acute distress.     Appearance: Normal appearance. He is well-developed. He is not ill-appearing, toxic-appearing or diaphoretic.   HENT:      Head: Normocephalic and atraumatic.      Right Ear: Hearing normal. No drainage or swelling.      Left Ear: Hearing normal. No drainage or swelling.      Mouth/Throat:      Mouth: Mucous membranes are dry.     Eyes:      General: Lids are normal.         Right eye: No discharge.         Left eye: No discharge.      Conjunctiva/sclera: Conjunctivae normal.     Neck:      Vascular: No JVD.      Trachea: Trachea normal.     Cardiovascular:      Rate and Rhythm: Normal rate and regular rhythm.      Pulses: Normal pulses.      Heart sounds: Normal heart sounds. No murmur heard.     No friction rub. No gallop.   Pulmonary:      Effort: Pulmonary effort is normal. No respiratory distress.      Breath sounds: Normal breath sounds. No stridor. No wheezing or rales.   Chest:      Chest wall: No tenderness.   Abdominal:      Palpations:  Abdomen is soft.      Tenderness: There is no abdominal tenderness. There is no guarding or rebound.     Musculoskeletal:         General: Normal range of motion.      Cervical back: Normal range of motion.      Right knee: No swelling.      Right lower leg: No edema.      Left lower leg: No edema.      Comments: Arthritic appearing right knee.  Patient is able to bend it past 90 degrees.  No effusion.  There is no erythema.     Skin:     General: Skin is warm and dry.      Coloration: Skin is not pale.      Findings: No rash.     Neurological:      General: No focal deficit present.      Mental Status: He is alert and oriented to person, place, and time.      GCS: GCS eye subscore is 4. GCS verbal subscore is 5. GCS motor subscore is 6.      Cranial Nerves: Cranial nerves 2-12 are intact. No cranial nerve deficit.      Sensory: Sensation is intact. No sensory deficit.      Motor: Tremor present. No weakness or abnormal muscle tone.     Psychiatric:         Mood and Affect: Mood normal.         Speech: Speech normal.         Behavior: Behavior is cooperative.         Results Reviewed       Procedure Component Value Units Date/Time    HS Troponin I 2hr [[id number]]  (Normal) Collected: 05/26/25 1205    Lab Status: Final result Specimen: Blood from Arm, Right Updated: 05/26/25 1234     hs TnI 2hr 19 ng/L      Delta 2hr hsTnI 0 ng/L     HS Troponin I 4hr [[id number]]     Lab Status: No result Specimen: Blood     TSH, 3rd generation with Free T4 reflex [[id number]]  (Normal) Collected: 05/26/25 1007    Lab Status: Final result Specimen: Blood from Arm, Right Updated: 05/26/25 1050     TSH 3RD GENERATON 1.345 uIU/mL     D-Dimer [[id number]]  (Abnormal) Collected: 05/26/25 1007    Lab Status: Final result Specimen: Blood from Arm, Right Updated: 05/26/25 1047     D-Dimer, Quant 0.95 ug/ml FEU     Narrative:      In the evaluation for possible pulmonary embolism, in the appropriate (Well's Score of 4 or less) patient, the age  adjusted d-dimer cutoff for this patient can be calculated as:    Age x 0.01 (in ug/mL) for Age-adjusted D-dimer exclusion threshold for a patient over 50 years.    HS Troponin 0hr (reflex protocol) [[id number]]  (Normal) Collected: 05/26/25 1007    Lab Status: Final result Specimen: Blood from Arm, Right Updated: 05/26/25 1040     hs TnI 0hr 19 ng/L     Protime-INR [[id number]]  (Normal) Collected: 05/26/25 1007    Lab Status: Final result Specimen: Blood from Arm, Right Updated: 05/26/25 1039     Protime 13.0 seconds      INR 0.96    Narrative:      INR Therapeutic Range    Indication                                             INR Range      Atrial Fibrillation                                               2.0-3.0  Hypercoagulable State                                    2.0.2.3  Left Ventricular Asist Device                            2.0-3.0  Mechanical Heart Valve                                  -    Aortic(with afib, MI, embolism, HF, LA enlargement,    and/or coagulopathy)                                     2.0-3.0 (2.5-3.5)     Mitral                                                             2.5-3.5  Prosthetic/Bioprosthetic Heart Valve               2.0-3.0  Venous thromboembolism (VTE: VT, PE        2.0-3.0    APTT [[id number]]  (Normal) Collected: 05/26/25 1007    Lab Status: Final result Specimen: Blood from Arm, Right Updated: 05/26/25 1039     PTT 26 seconds     B-Type Natriuretic Peptide(BNP) [[id number]]  (Normal) Collected: 05/26/25 1007    Lab Status: Final result Specimen: Blood from Arm, Right Updated: 05/26/25 1038     BNP 18 pg/mL     Comprehensive metabolic panel [[id number]]  (Abnormal) Collected: 05/26/25 1007    Lab Status: Final result Specimen: Blood from Arm, Right Updated: 05/26/25 1034     Sodium 140 mmol/L      Potassium 3.4 mmol/L      Chloride 104 mmol/L      CO2 22 mmol/L      ANION GAP 14 mmol/L      BUN 10 mg/dL      Creatinine 0.76 mg/dL      Glucose 112 mg/dL      Calcium 9.3  mg/dL      AST 28 U/L      ALT 22 U/L      Alkaline Phosphatase 56 U/L      Total Protein 7.2 g/dL      Albumin 4.3 g/dL      Total Bilirubin 1.96 mg/dL      eGFR 91 ml/min/1.73sq m     Narrative:      National Kid[employer] Foundation guidelines for Chronic Kidney Disease (CKD):     Stage 1 with normal or high GFR (GFR > 90 mL/min/1.73 square meters)    Stage 2 Mild CKD (GFR = 60-89 mL/min/1.73 square meters)    Stage 3A Moderate CKD (GFR = 45-59 mL/min/1.73 square meters)    Stage 3B Moderate CKD (GFR = 30-44 mL/min/1.73 square meters)    Stage 4 Severe CKD (GFR = 15-29 mL/min/1.73 square meters)    Stage 5 End Stage CKD (GFR <15 mL/min/1.73 square meters)  Note: GFR calculation is accurate only with a steady state creatinine    Magnesium [[id number]]  (Abnormal) Collected: 05/26/25 1007    Lab Status: Final result Specimen: Blood from Arm, Right Updated: 05/26/25 1034     Magnesium 1.8 mg/dL     Lipase [[id number]]  (Normal) Collected: 05/26/25 1007    Lab Status: Final result Specimen: Blood from Arm, Right Updated: 05/26/25 1034     Lipase 27 u/L     Ethanol [[id number]]  (Abnormal) Collected: 05/26/25 1007    Lab Status: Final result Specimen: Blood from Arm, Right Updated: 05/26/25 1034     Ethanol Lvl 33 mg/dL     CBC and differential [[id number]]  (Abnormal) Collected: 05/26/25 1007    Lab Status: Final result Specimen: Blood from Arm, Right Updated: 05/26/25 1018     WBC 4.54 Thousand/uL      RBC 4.70 Million/uL      Hemoglobin 13.7 g/dL      Hematocrit 39.7 %      MCV 85 fL      MCH 29.1 pg      MCHC 34.5 g/dL      RDW 14.8 %      MPV 9.5 fL      Platelets 243 Thousands/uL      nRBC 0 /100 WBCs      Segmented % 52 %      Immature Grans % 0 %      Lymphocytes % 26 %      Monocytes % 17 %      Eosinophils Relative 4 %      Basophils Relative 1 %      Absolute Neutrophils 2.33 Thousands/µL      Absolute Immature Grans 0.01 Thousand/uL      Absolute Lymphocytes 1.20 Thousands/µL      Absolute Monocytes 0.77  Thousand/µL      Eosinophils Absolute 0.19 Thousand/µL      Basophils Absolute 0.04 Thousands/µL     Rapid drug screen, urine [[id number]]     Lab Status: No result Specimen: Urine             CTA chest pe study   ED Interpretation by Hunter Perkins MD (05/26 1129)   I have reviewed the film, per my independent interpretation : No large central pulmonary emboli or obvious aortic dissection.  No large infiltrate or effusion.  No pneumothorax. Formal Read per radiology.        Final Interpretation by Donald Miller MD (05/26 1206)      No pulmonary embolism.      Dilated main pulmonary artery consistent with pulmonary arterial hypertension.      Fatty liver.                  Computerized Assisted Algorithm (CAA) may have aided analysis of applicable images.      Workstation performed: [id number]         XR chest 2 views   ED Interpretation by Hunter Perkins MD (05/26 1049)   I have reviewed the film, per my independent interpretation : X-ray appears quite rotated.  No obvious effusion or pneumothorax.. Formal Read per radiology.        Final Interpretation by Rosibel Brown MD (05/26 1038)      Prominence of the right pulmonary hilum. Further evaluation is recommended with CT of the chest.            Workstation performed: [id number]         XR knee 4+ views Right injury   ED Interpretation by Hunter Perkins MD (05/26 1050)   I have reviewed the film, per my independent interpretation : No fracture or dislocation. Formal Read per radiology.            ECG 12 Lead Documentation Only    Date/Time: 5/26/2025 10:03 AM    Performed by: Hunter Perkins MD  Authorized by: Hunter Perkins MD    Indications / Diagnosis:  Cp, alcohol withdrawal  ECG reviewed by me, the ED Provider: yes    Patient location:  ED  Interpretation:     Interpretation: abnormal    Rate:     ECG rate:  101    ECG rate assessment: tachycardic    Rhythm:     Rhythm: sinus tachycardia    Ectopy:      Ectopy: none    QRS:     QRS axis:  Left    QRS intervals:  Wide  Conduction:     Conduction: abnormal      Abnormal conduction: complete RBBB    ST segments:     ST segments:  Non-specific  T waves:     T waves: inverted      Inverted:  III and aVF  ECG 12 Lead Documentation Only    Date/Time: 5/26/2025 12:27 PM    Performed by: Hunter Perkins MD  Authorized by: Hunter Perkins MD    Indications / Diagnosis:  Alcohol withdrawal  ECG reviewed by me, the ED Provider: yes    Patient location:  ED  Interpretation:     Interpretation: non-specific    Rhythm:     Rhythm: sinus rhythm    Ectopy:     Ectopy: PVCs    QRS:     QRS axis:  Left    QRS intervals:  Wide  Conduction:     Conduction: abnormal      Abnormal conduction: complete RBBB    ST segments:     ST segments:  Non-specific  T waves:     T waves: non-specific        ED Medication and Procedure Management   Prior to Admission Medications   Prescriptions Last Dose Informant Patient Reported? Taking?   amLODIPine (NORVASC) 10 mg tablet   No No   Sig: Take 1 tablet (10 mg total) by mouth daily   folic acid (FOLVITE) 1 mg tablet   No No   Sig: Take 1 tablet (1 mg total) by mouth daily   gabapentin (NEURONTIN) 300 mg capsule   No No   Sig: Take 1 capsule (300 mg total) by mouth 3 (three) times a day for 10 days   lisinopril (ZESTRIL) 20 mg tablet  Self Yes No   Sig: Take 20 mg by mouth daily   Patient not taking: Reported on 5/26/2025   lurasidone 60 MG TABS   No No   Sig: Take 1 tablet (60 mg total) by mouth daily with dinner   methocarbamol (ROBAXIN) 750 mg tablet   Yes No   Sig: Take 750 mg by mouth as needed in the morning and 750 mg as needed at noon and 750 mg as needed in the evening.   rosuvastatin (CRESTOR) 10 MG tablet   Yes No   Sig: Take 10 mg by mouth daily   Patient not taking: Reported on 3/14/2025   thiamine 100 MG tablet   No No   Sig: Take 1 tablet (100 mg total) by mouth daily   traZODone (DESYREL) 50 mg tablet   No No    Sig: Take 1 tablet (50 mg total) by mouth daily at bedtime as needed for sleep      Facility-Administered Medications: None     Discharge Medication List as of 5/26/2025  2:25 PM        CONTINUE these medications which have NOT CHANGED    Details   amLODIPine (NORVASC) 10 mg tablet Take 1 tablet (10 mg total) by mouth daily, Starting Wed 9/22/2021, Until Fri 10/22/2021, Normal      folic acid (FOLVITE) 1 mg tablet Take 1 tablet (1 mg total) by mouth daily, Starting Mon 3/17/2025, Normal      gabapentin (NEURONTIN) 300 mg capsule Take 1 capsule (300 mg total) by mouth 3 (three) times a day for 10 days, Starting Tue 9/21/2021, Until Fri 10/1/2021, Normal      lisinopril (ZESTRIL) 20 mg tablet Take 20 mg by mouth daily, Historical Med      lurasidone 60 MG TABS Take 1 tablet (60 mg total) by mouth daily with dinner, Starting Tue 9/21/2021, Until Thu 10/21/2021, Normal      methocarbamol (ROBAXIN) 750 mg tablet Take 750 mg by mouth Three times daily as needed, Starting Mon 3/10/2025, Historical Med      rosuvastatin (CRESTOR) 10 MG tablet Take 10 mg by mouth daily, Starting Tue 3/26/2024, Until Wed 3/26/2025, Historical Med      thiamine 100 MG tablet Take 1 tablet (100 mg total) by mouth daily, Starting Mon 3/17/2025, Normal      traZODone (DESYREL) 50 mg tablet Take 1 tablet (50 mg total) by mouth daily at bedtime as needed for sleep, Starting Tue 9/21/2021, Until Thu 10/21/2021 at 2359, Normal           No discharge procedures on file.  ED SEPSIS DOCUMENTATION   Time reflects when diagnosis was documented in both MDM as applicable and the Disposition within this note       Time User Action Codes Description Comment    5/26/2025 12:24 PM Hunter Perkins [F10.10] Alcohol abuse     5/26/2025 12:25 PM Hunter Perkins [F10.939] Alcohol withdrawal (HCC)     5/26/2025 12:25 PM Hunter Perkins [R93.89] Abnormal CT of the chest     5/26/2025 12:26 PM Hunter Perkins Add [I27.20]  Pulmonary HTN (HCC)     5/26/2025 12:47 PM Hunter Perkins [K76.0] Hepatic steatosis                    [1]   Past Medical History:  Diagnosis Date    Anxiety     Depression     Diabetes mellitus (HCC)     Hypertension     PTSD (post-traumatic stress disorder)    [2]   Past Surgical History:  Procedure Laterality Date    BACK SURGERY      back fusion   [3] No family history on file.  [4]   Social History  Tobacco Use    Smoking status: Former    Smokeless tobacco: Never   Vaping Use    Vaping status: Never Used   Substance Use Topics    Alcohol use: Yes    Drug use: No        Hunter Perkins MD  05/26/25 1537

## 2025-05-27 LAB
ALBUMIN SERPL BCG-MCNC: 3.5 G/DL (ref 3.5–5)
ALP SERPL-CCNC: 48 U/L (ref 34–104)
ALT SERPL W P-5'-P-CCNC: 16 U/L (ref 7–52)
ANION GAP SERPL CALCULATED.3IONS-SCNC: 9 MMOL/L (ref 4–13)
AST SERPL W P-5'-P-CCNC: 20 U/L (ref 13–39)
BILIRUB SERPL-MCNC: 2.57 MG/DL (ref 0.2–1)
BUN SERPL-MCNC: 6 MG/DL (ref 5–25)
CALCIUM SERPL-MCNC: 8.2 MG/DL (ref 8.4–10.2)
CHLORIDE SERPL-SCNC: 106 MMOL/L (ref 96–108)
CO2 SERPL-SCNC: 25 MMOL/L (ref 21–32)
CREAT SERPL-MCNC: 0.58 MG/DL (ref 0.6–1.3)
ERYTHROCYTE [DISTWIDTH] IN BLOOD BY AUTOMATED COUNT: 14.6 % (ref 11.6–15.1)
GFR SERPL CREATININE-BSD FRML MDRD: 101 ML/MIN/1.73SQ M
GLUCOSE SERPL-MCNC: 82 MG/DL (ref 65–140)
HCT VFR BLD AUTO: 37.6 % (ref 36.5–49.3)
HGB BLD-MCNC: 12.1 G/DL (ref 12–17)
MAGNESIUM SERPL-MCNC: 1.9 MG/DL (ref 1.9–2.7)
MCH RBC QN AUTO: 28 PG (ref 26.8–34.3)
MCHC RBC AUTO-ENTMCNC: 32.2 G/DL (ref 31.4–37.4)
MCV RBC AUTO: 87 FL (ref 82–98)
PLATELET # BLD AUTO: 220 THOUSANDS/UL (ref 149–390)
PMV BLD AUTO: 9.5 FL (ref 8.9–12.7)
POTASSIUM SERPL-SCNC: 3.1 MMOL/L (ref 3.5–5.3)
PROT SERPL-MCNC: 6 G/DL (ref 6.4–8.4)
RBC # BLD AUTO: 4.32 MILLION/UL (ref 3.88–5.62)
SODIUM SERPL-SCNC: 140 MMOL/L (ref 135–147)
WBC # BLD AUTO: 4.42 THOUSAND/UL (ref 4.31–10.16)

## 2025-05-27 PROCEDURE — 83735 ASSAY OF MAGNESIUM: CPT

## 2025-05-27 PROCEDURE — 85027 COMPLETE CBC AUTOMATED: CPT

## 2025-05-27 PROCEDURE — 80053 COMPREHEN METABOLIC PANEL: CPT

## 2025-05-27 PROCEDURE — 99232 SBSQ HOSP IP/OBS MODERATE 35: CPT | Performed by: EMERGENCY MEDICINE

## 2025-05-27 PROCEDURE — 99232 SBSQ HOSP IP/OBS MODERATE 35: CPT | Performed by: PHYSICIAN ASSISTANT

## 2025-05-27 RX ORDER — PHENOBARBITAL SODIUM 130 MG/ML
130 INJECTION, SOLUTION INTRAMUSCULAR; INTRAVENOUS ONCE
Status: COMPLETED | OUTPATIENT
Start: 2025-05-27 | End: 2025-05-27

## 2025-05-27 RX ORDER — LOPERAMIDE HYDROCHLORIDE 2 MG/1
2 CAPSULE ORAL 4 TIMES DAILY PRN
Status: DISCONTINUED | OUTPATIENT
Start: 2025-05-27 | End: 2025-05-28 | Stop reason: HOSPADM

## 2025-05-27 RX ORDER — LOPERAMIDE HCL 1 MG/7.5ML
2 SUSPENSION ORAL 4 TIMES DAILY PRN
Status: DISCONTINUED | OUTPATIENT
Start: 2025-05-27 | End: 2025-05-27

## 2025-05-27 RX ORDER — MAGNESIUM SULFATE HEPTAHYDRATE 40 MG/ML
2 INJECTION, SOLUTION INTRAVENOUS ONCE
Status: COMPLETED | OUTPATIENT
Start: 2025-05-27 | End: 2025-05-27

## 2025-05-27 RX ORDER — POTASSIUM CHLORIDE 1500 MG/1
40 TABLET, EXTENDED RELEASE ORAL ONCE
Status: COMPLETED | OUTPATIENT
Start: 2025-05-27 | End: 2025-05-27

## 2025-05-27 RX ORDER — POTASSIUM CHLORIDE 14.9 MG/ML
20 INJECTION INTRAVENOUS ONCE
Status: COMPLETED | OUTPATIENT
Start: 2025-05-27 | End: 2025-05-27

## 2025-05-27 RX ORDER — PHENOBARBITAL 64.8 MG/1
64.8 TABLET ORAL ONCE
Status: COMPLETED | OUTPATIENT
Start: 2025-05-27 | End: 2025-05-27

## 2025-05-27 RX ADMIN — SODIUM CHLORIDE 100 ML/HR: 0.9 INJECTION, SOLUTION INTRAVENOUS at 11:11

## 2025-05-27 RX ADMIN — LOPERAMIDE HYDROCHLORIDE 2 MG: 2 CAPSULE ORAL at 16:22

## 2025-05-27 RX ADMIN — ACETAMINOPHEN 650 MG: 325 TABLET, FILM COATED ORAL at 05:54

## 2025-05-27 RX ADMIN — FOLIC ACID 1 MG: 1 TABLET ORAL at 08:35

## 2025-05-27 RX ADMIN — ACETAMINOPHEN 650 MG: 325 TABLET, FILM COATED ORAL at 22:15

## 2025-05-27 RX ADMIN — MAGNESIUM SULFATE HEPTAHYDRATE 2 G: 40 INJECTION, SOLUTION INTRAVENOUS at 08:36

## 2025-05-27 RX ADMIN — SODIUM CHLORIDE 100 ML/HR: 0.9 INJECTION, SOLUTION INTRAVENOUS at 01:18

## 2025-05-27 RX ADMIN — ENOXAPARIN SODIUM 40 MG: 40 INJECTION SUBCUTANEOUS at 08:36

## 2025-05-27 RX ADMIN — ACETAMINOPHEN 650 MG: 325 TABLET, FILM COATED ORAL at 14:06

## 2025-05-27 RX ADMIN — KETOROLAC TROMETHAMINE 30 MG: 30 INJECTION, SOLUTION INTRAMUSCULAR; INTRAVENOUS at 02:29

## 2025-05-27 RX ADMIN — POTASSIUM CHLORIDE 40 MEQ: 1500 TABLET, EXTENDED RELEASE ORAL at 08:36

## 2025-05-27 RX ADMIN — KETOROLAC TROMETHAMINE 30 MG: 30 INJECTION, SOLUTION INTRAMUSCULAR; INTRAVENOUS at 19:21

## 2025-05-27 RX ADMIN — METHOCARBAMOL 500 MG: 500 TABLET ORAL at 05:54

## 2025-05-27 RX ADMIN — THIAMINE HCL TAB 100 MG 100 MG: 100 TAB at 08:36

## 2025-05-27 RX ADMIN — METHOCARBAMOL 500 MG: 500 TABLET ORAL at 22:15

## 2025-05-27 RX ADMIN — POTASSIUM CHLORIDE 20 MEQ: 14.9 INJECTION, SOLUTION INTRAVENOUS at 11:12

## 2025-05-27 RX ADMIN — PHENOBARBITAL 64.8 MG: 64.8 TABLET ORAL at 08:35

## 2025-05-27 RX ADMIN — METHOCARBAMOL 500 MG: 500 TABLET ORAL at 14:06

## 2025-05-27 RX ADMIN — MULTIPLE VITAMINS W/ MINERALS TAB 1 TABLET: TAB ORAL at 08:35

## 2025-05-27 RX ADMIN — PHENOBARBITAL SODIUM 130 MG: 130 INJECTION INTRAMUSCULAR; INTRAVENOUS at 05:54

## 2025-05-27 NOTE — ASSESSMENT & PLAN NOTE
Last drink: 5/25 AM   Ethanol lvl: 33 10:07 am  Received Valium 2.5 mg of valium   Toxicology consulted: appreciate recommendations   Can discontinue SEWS protocol   Total phenobarbital administered: 1104.8mg  Total diazepam administered: 12.5mg

## 2025-05-27 NOTE — ASSESSMENT & PLAN NOTE
Patient with a history of HTN  Home medication regimen: amlodipine 10 mg, lisinopril 20 mg   Can resume home medication regimen on discharge

## 2025-05-27 NOTE — PLAN OF CARE
Problem: PAIN - ADULT  Goal: Verbalizes/displays adequate comfort level or baseline comfort level  Description: Interventions:  - Encourage patient to monitor pain and request assistance  - Assess pain using appropriate pain scale  - Administer analgesics as ordered based on type and severity of pain and evaluate response  - Implement non-pharmacological measures as appropriate and evaluate response  - Consider cultural and social influences on pain and pain management  - Notify physician/advanced practitioner if interventions unsuccessful or patient reports new pain  - Educate patient/family on pain management process including their role and importance of  reporting pain   - Provide non-pharmacologic/complimentary pain relief interventions  Outcome: Progressing     Problem: INFECTION - ADULT  Goal: Absence or prevention of progression during hospitalization  Description: INTERVENTIONS:  - Assess and monitor for signs and symptoms of infection  - Monitor lab/diagnostic results  - Monitor all insertion sites, i.e. indwelling lines, tubes, and drains  - Monitor endotracheal if appropriate and nasal secretions for changes in amount and color  - [place] appropriate cooling/warming therapies per order  - Administer medications as ordered  - Instruct and encourage patient and family to use good hand hygiene technique  - Identify and instruct in appropriate isolation precautions for identified infection/condition  Outcome: Progressing     Problem: SAFETY ADULT  Goal: Patient will remain free of falls  Description: INTERVENTIONS:  - Educate patient/family on patient safety including physical limitations  - Instruct patient to call for assistance with activity   - Consider consulting OT/PT to assist with strengthening/mobility based on AM PAC & JH-HLM score  - Consult OT/PT to assist with strengthening/mobility   - Keep Call bell within reach  - Keep bed low and locked with side rails adjusted as appropriate  - Keep  care items and personal belongings within reach  - Initiate and maintain comfort rounds  - Apply yellow socks and bracelet for high fall risk patients  Outcome: Progressing     Problem: SUBSTANCE USE/ABUSE  Goal: By discharge, will develop insight into their chemical dependency and sustain motivation to continue in recovery  Description: INTERVENTIONS:  - Attends all daily group sessions and scheduled AA groups  - Actively practices coping skills through participation in the therapeutic community and adherence to program rules  - Reviews and completes assignments from individual treatment plan  - Assist patient development of understanding of their personal cycle of addiction and relapse triggers  Outcome: Progressing

## 2025-05-27 NOTE — PLAN OF CARE
Problem: PAIN - ADULT  Goal: Verbalizes/displays adequate comfort level or baseline comfort level  Description: Interventions:  - Encourage patient to monitor pain and request assistance  - Assess pain using appropriate pain scale  - Administer analgesics as ordered based on type and severity of pain and evaluate response  - Implement non-pharmacological measures as appropriate and evaluate response  - Consider cultural and social influences on pain and pain management  - Notify physician/advanced practitioner if interventions unsuccessful or patient reports new pain  - Educate patient/family on pain management process including their role and importance of  reporting pain   - Provide non-pharmacologic/complimentary pain relief interventions  Outcome: Progressing     Problem: SAFETY ADULT  Goal: Patient will remain free of falls  Description: INTERVENTIONS:  - Educate patient/family on patient safety including physical limitations  - Instruct patient to call for assistance with activity   - Consider consulting OT/PT to assist with strengthening/mobility based on AM PAC & JH-HLM score  - Consult OT/PT to assist with strengthening/mobility   - Keep Call bell within reach  - Keep bed low and locked with side rails adjusted as appropriate  - Keep care items and personal belongings within reach  - Initiate and maintain comfort rounds  - Apply yellow socks and bracelet for high fall risk patients  Outcome: Progressing     Problem: SAFETY ADULT  Goal: Maintain or return to baseline ADL function  Description: INTERVENTIONS:  - Assess patient's need for assistive devices and provide as appropriate  - Encourage maximum independence but intervene and supervise when necessary  - Consider OT consult to assist with ADL evaluation and planning for discharge  - Provide patient education as appropriate  - Monitor functional capacity and physical performance, use of AM PAC & JH-HLM   - Monitor gait, balance and fatigue with  ambulation    Outcome: Progressing     Problem: SUBSTANCE USE/ABUSE  Goal: By discharge, will develop insight into their chemical dependency and sustain motivation to continue in recovery  Description: INTERVENTIONS:  - Attends all daily group sessions and scheduled AA groups  - Actively practices coping skills through participation in the therapeutic community and adherence to program rules  - Reviews and completes assignments from individual treatment plan  - Assist patient development of understanding of their personal cycle of addiction and relapse triggers  Outcome: Progressing     Problem: SUBSTANCE USE/ABUSE  Goal: By discharge, patient will have ongoing treatment plan addressing chemical dependency  Description: INTERVENTIONS:  - Assist patient with resources and/or appointments for ongoing recovery based living  Outcome: Progressing

## 2025-05-27 NOTE — ASSESSMENT & PLAN NOTE
Decreased from 3.4 on admission to 3.1 on 5/27   Will replete with IV and PO supplementation  Replete with magnesium as well

## 2025-05-27 NOTE — ASSESSMENT & PLAN NOTE
Withdrawal adequately controlled with phenobarbital.  Total phenobarbital: 1.23 grams  SEWS to be discontinued  Patient clear from a toxicology stand-point. Toxicology to sign off.   Continue supportive care, thiamine, folate.

## 2025-05-27 NOTE — DISCHARGE INSTR - OTHER ORDERS
Charis Solano   Certified     [facility]  [phone]  Monday-Friday 6:45am-3:15pm  AA meeting guide   https://www.aa.org/find-aa    AA Phone apps:   Meeting Guide  Everything AA  In the Rooms    AA/24 hour hotline   [phone]    SYN Recovery Events    [facility] organizes meaningful events for people in recovery and their families. Our main goal is to have fun by connecting with nature and other people. We can then realize that there is a world of possibilities open to us, now that we are no longer in the bondage of addiction. These events are designed to provide :  Hope for those in early recovery  Tangible proof that life gets better when we’re sober  Service opportunities for people with recovery experience  Social connectedness for everyone involved    [address]  [place] [address]  Phone: [phone]  Email: info@[employer]   Website: https://QponDirect.org/    SMART Recovery Meetings    Self Management and Recovery Training (SMART)  SMART Recovery is an evidenced-informed recovery method grounded in Rational Emotive Behavioral Therapy (REBT) and Cognitive Behavioral Therapy (CBT), that supports people with substance dependencies or problem behaviors to:  Build and maintain motivation  [place] with urges and cravings  Manage thoughts, feelings and behaviors  Live a balanced life    Find meetings and tools: [email or web address]    Outpatient Counseling Resources  [facility]   [address], [place] [address] [phone]  [email or web address]    [facility]  [address], [address]  [place] [address] [phone]  [email or web address]     American Organization  [address] [facility] [place] [address] [phone]  www.yuval-.org             CRISIS INFORMATION  If you are  experiencing a mental health emergency, you may call the [facility] Crisis Intervention Office 24 hours a day, 7 days per week at [phone].    In [facility], call [phone].    Warmline is a confidential 24/7 telephone support service manned by trained mental health consumers.  Warmline provides support, a listening ear and can provide information about available services.Warmline specializes in the concerns of mental health consumers, their families and friends.  However, we are also here for anyone who has a mental health concern, is confused about or just doesn't know anything about mental health or where to get information.  To reach [facility], call [phone].    HOW TO GET SUBSTANCE ABUSE HELP:  If you or someone you know has a drug or alcohol problem, there is help:  [facility] Drug & Alcohol Abuse Services: [phone]  [facility] Drug & Alcohol Abuse Services: [phone]  An assessment is the first step.   In addition to those listed there are other programs available in the area but assessment is best to determine an appropriate level of care.  If you DO NOT have Medical Assistance (MA) or Private Insurance, an assessment can be scheduled at one of these providers:  [facility]  [address] [place] [address]  [phone]   [facility]  [address] [place] [address]  [phone]   [address] [place] [address]  [phone]   [facility]  [address] [place] [address]  [phone]   [facility]  [address] [place] [address]  [phone]   [facility]  [address] [place] [address]  [phone]     If you HAVE Medical Assistance, an assessment can be scheduled at one of these providers:  [place]  [address] [place] [address]  [phone]   [facility]  [address] [place]  [address]  [phone]   [facility]  [address], [place] [address]  [phone]  [address] [place] [address], [address], [place] [address] [phone]   [address] [address]  [phone]   [address] [place] [address]  [phone]   [facility])  [address] [place] [address]  [phone]   [facility]  [address] [place] [address]  [phone]   [facility]  [address] [place] [address]  [phone]   [facility]  [address] [place] [address]  [phone]     If you HAVE Private Insurance, an assessment can be scheduled at one of these providers.  Please contact these Providers to determine if they are in your network plan:  [facility]  [address], [place] [address]  [phone]   [address] [address]  [phone]   [address] [place] [address]  [phone]   [facility])  [address] [place] [address]  [phone]   [facility]  [address] [place] [address]  [phone]     From the [facility] website www.[place].gov/guides/opioid-epidemic/#GetNaloxone    How do I get naloxone?  Family members and friends can access naloxone by:    Obtaining a prescription from their family doctor  Using the standing order issued by [facility] Physician General Karina Carmona. A standing order is a prescription written for the general public, rather than specifically for an individual.  To use the standing order, print it and take it with you to the pharmacy or have the digital version on your phone. Download the standing order from the Department of [facility] (PDF).    If you are unable to print it or use the digital version,  the standing order is kept on file at many pharmacies. If a pharmacy does not have it on file, they may have the ability to look it up.    Naloxone prescriptions can be filled at most pharmacies. Although the medication might not be available for same-day pickup, it often can be ordered and available within a day or two.       Transportation Resources    [employer]/ [employer]  General Information  Customer Online Comment/Complaint Form  Customer Service:  [phone]  Monday to Friday - 7 AM to  5:30 PM  Saturday - 8 AM to 4:30 PM  Sunday - 8 AM to 4:30 PM - call center only  Customer Service is closed on holidays that bus service does not run.  [employer]  [phone] - Service Reservations  [phone] - General [employer]us Applications  Monday to Friday -- 8 AM to 4:30 PM  [place] Office:  [address] [place] [address]  Monday to Friday -- 8 AM to 4:30 PM  [place] Office:  [address] [place] [address]  Monday to Friday -- 8 AM to 4:30 PM  [employer] and [employer] service hours are  Weekdays - 5:30 AM to 7:30 PM  Saturdays - 5:30 AM to 7:30 PM  Sundays - 7:00 AM to 7:15 PM  Service on 100 series routes operates after 7:30 pm as late as midnight on weekdays and Saturdays.  Please see individual schedules for details on specific service times.  [employer] ([employer] ADA paratransit service is available during all [employer] service hours.    [employer]    We established [employer] to help cope with our loss. We want his legacy of compassion and generosity to continue long after his death. Using a ride share service such as [employer] or Uber, [employer] will provide free transportation to people living with substance use disorder to get to recovery services. Transportation to outpatient rehab, 12-step meetings and other recovery resources is often a significant barrier to recovery, and Reji would want us to help mitigate that. By helping others who live with substance  use disorder, Reji continues to make a difference in our world.    Phone: [phone]  Email: info@[employer].org  Website: https://Pecabu.org/    Medical Services Included in Medical Assistance Transportation Program   Transportation is available to almost any service that MA pays for. Transportation can be provided to: physicians, dentists, health clinics, podiatrists, rural health clinics, hospice programs, physical therapists, outpatient services, pharmacies, drug and alcohol clinics, mental health centers, outpatient rehab services, optometrists, dialysis clinics, psychologists, and ambulatory surgical services.   Services that MATP does not include are emergency or other transportation requiring an ambulance, transportation to sheltered workshops, day care programs, transportation for visitation purposes, stretcher service, filk-tzbqpym-otml service, transportation to non-medical services, and transportation during severe weather when deemed unsafe or transportation to any medical services that are not payable through the Medical Assistance Program.   Exceptional transportation costs such as air travel, lodging, meals, and attendants are paid for by local county assistance offices instead of MATP.    [facility]   [phone]

## 2025-05-27 NOTE — PLAN OF CARE
Problem: PAIN - ADULT  Goal: Verbalizes/displays adequate comfort level or baseline comfort level  Description: Interventions:  - Encourage patient to monitor pain and request assistance  - Assess pain using appropriate pain scale  - Administer analgesics as ordered based on type and severity of pain and evaluate response  - Implement non-pharmacological measures as appropriate and evaluate response  - Consider cultural and social influences on pain and pain management  - Notify physician/advanced practitioner if interventions unsuccessful or patient reports new pain  - Educate patient/family on pain management process including their role and importance of  reporting pain   - Provide non-pharmacologic/complimentary pain relief interventions  5/27/2025 0539 by Alma Rosa Olvera RN  Outcome: Progressing  5/26/2025 2310 by Alma Rosa Olvera RN  Outcome: Progressing     Problem: INFECTION - ADULT  Goal: Absence or prevention of progression during hospitalization  Description: INTERVENTIONS:  - Assess and monitor for signs and symptoms of infection  - Monitor lab/diagnostic results  - Monitor all insertion sites, i.e. indwelling lines, tubes, and drains  - Monitor endotracheal if appropriate and nasal secretions for changes in amount and color  - [place] appropriate cooling/warming therapies per order  - Administer medications as ordered  - Instruct and encourage patient and family to use good hand hygiene technique  - Identify and instruct in appropriate isolation precautions for identified infection/condition  5/27/2025 0539 by Alma Rosa Olvera RN  Outcome: Progressing  5/26/2025 2310 by Alma Rosa Olvera RN  Outcome: Progressing     Problem: SAFETY ADULT  Goal: Patient will remain free of falls  Description: INTERVENTIONS:  - Educate patient/family on patient safety including physical limitations  - Instruct patient to call for assistance with activity   - Consider consulting OT/PT to assist with strengthening/mobility  based on AM PAC & JH-HLM score  - Consult OT/PT to assist with strengthening/mobility   - Keep Call bell within reach  - Keep bed low and locked with side rails adjusted as appropriate  - Keep care items and personal belongings within reach  - Initiate and maintain comfort rounds  - Apply yellow socks and bracelet for high fall risk patients  5/27/2025 0539 by Alma Rosa Olvera RN  Outcome: Progressing  5/26/2025 2310 by Alma Rosa Olvera RN  Outcome: Progressing     Problem: SUBSTANCE USE/ABUSE  Goal: By discharge, will develop insight into their chemical dependency and sustain motivation to continue in recovery  Description: INTERVENTIONS:  - Attends all daily group sessions and scheduled AA groups  - Actively practices coping skills through participation in the therapeutic community and adherence to program rules  - Reviews and completes assignments from individual treatment plan  - Assist patient development of understanding of their personal cycle of addiction and relapse triggers  5/27/2025 0539 by Alma Rosa Olvera RN  Outcome: Progressing  5/26/2025 2310 by Alma Rosa Olvera RN  Outcome: Progressing

## 2025-05-27 NOTE — ASSESSMENT & PLAN NOTE
Drinks a gallon of whiskey over 2 days   Previous admission to the [facility] March 2025   Denies H/o withdrawal seizures  Reports previous withdrawal complicated by: Delirium Tremens   Cannot take naltrexone at this time as patient intermittently takes tramadol for chronic knee pain  Initiate IVFs, IV thiamine, folic acid, and MVI  Consult case management/CRS for assistance with aftercare resources - outpatient resources

## 2025-05-27 NOTE — ASSESSMENT & PLAN NOTE
Patient has been evaluated in the ED multiple times secondary to chronic right knee pain  Was evaluated in [facility] ED on 5/15 and 5/24   Did have fluid aspiration on 5/15   Referred to outpatient ortho and had appointment for 5/28   XR R knee: IMPRESSION: Degenerative changes of the right knee.   PT eval and treat   Will treat with tylenol and toradol as patient has history of opioid use disorder and this pain has been chronic since March  Continue topical voltaren on discharge   Patient would also benefit from pain management referral upon discharge as well as follow up with orthopedics.

## 2025-05-27 NOTE — PROGRESS NOTES
Patient's last SEWS score was a 3 d/t HTN. He has h/o HTN and has current c/o pain. Pain medication administered and medication for withdrawal not given at this time. Will continue to monitor.

## 2025-05-27 NOTE — PROGRESS NOTES
"Progress Note - Hospitalist   Name: Christian Ball 70 y.o. male I MRN: [id number]  Unit/Bed#: 5T [facility] 505-01 I Date of Admission: 5/26/2025   Date of Service: 5/27/2025 I Hospital Day: 1    Assessment & Plan  Alcohol withdrawal syndrome without complication (HCC)  Last drink: 5/25 AM   Ethanol lvl: 33 10:07 am  Received Valium 2.5 mg of valium   Toxicology consulted: appreciate recommendations   Can discontinue SEWS protocol   Total phenobarbital administered: 1104.8mg  Total diazepam administered: 12.5mg   Alcohol use disorder, severe, dependence (HCC)  Drinks a gallon of whiskey over 2 days   Previous admission to the [facility] Medical Detox Unit March 2025   Denies H/o withdrawal seizures  Reports previous withdrawal complicated by: Delirium Tremens   Cannot take naltrexone at this time as patient intermittently takes tramadol for chronic knee pain  Initiate IVFs, IV thiamine, folic acid, and MVI  Consult case management/CRS for assistance with aftercare resources - outpatient resources   Hypertension  Patient with a history of HTN  Home medication regimen: amlodipine 10 mg, lisinopril 20 mg   Can resume home medication regimen on discharge  Bipolar 1 disorder, depressed, severe (HCC)  Previously on gabapentin 300 mg TID and latuda 60 mg   He is currently not taking any psychiatric medication at this time   Evaluated by psychiatry during last admission who recommended outpatient follow up   Type 2 diabetes mellitus with ophthalmic complication, without long-term current use of insulin ([facility])  Lab Results   Component Value Date    HGBA1C 5.7 (H) 03/14/2025       No results for input(s): \"POCGLU\" in the last 72 hours.    Blood Sugar Average: Last 72 hrs:  Currently on Ozempic 0.25 mg weekly   HGB A1C 5.7   Diabetic Diet   Can resume Ozempic on discharge   Chronic knee pain  Patient has been evaluated in the ED multiple times secondary to chronic right knee pain  Was evaluated in [facility] ED on 5/15 and 5/24   Did have " fluid aspiration on 5/15   Referred to outpatient ortho and had appointment for 5/28   XR R knee: IMPRESSION: Degenerative changes of the right knee.   PT eval and treat   Will treat with tylenol and toradol as patient has history of opioid use disorder and this pain has been chronic since March  Continue topical voltaren on discharge   Patient would also benefit from pain management referral upon discharge as well as follow up with orthopedics.   Hypokalemia  Decreased from 3.4 on admission to 3.1 on 5/27   Will replete with IV and PO supplementation  Replete with magnesium as well   Chest pain  Endorsing right sided chest pain in the ED   EKG without acute ischemic changes   CXR: Prominence of the right pulmonary hilum. Further evaluation is recommended with CT of the chest.  D-Dimer 0.95   CTA: No pulmonary embolism. Dilated main pulmonary artery consistent with pulmonary arterial hypertension.Fatty liver.  Patient will require outpatient pulmonology appointment upon discharge     VTE Pharmacologic Prophylaxis:   Low Risk (Score 0-2) - Encourage Ambulation.    Mobility:   Basic Mobility Inpatient Raw Score: 13  JH-HL Goal: 4: Move to chair/commode  JH-HLM Achieved: 3: Sit at edge of bed  JH-HLM Goal achieved. Continue to encourage appropriate mobility.    Patient Centered Rounds: I performed bedside rounds with nursing staff today.   Discussions with Specialists or Other Care Team Provider: TOxicology    Education and Discussions with Family / Patient: Patient declined call to .     Current Length of Stay: 1 day(s)  Current Patient Status: Inpatient   Certification Statement: The patient will continue to require additional inpatient hospital stay due to electrolyte abnormalities requiring IV repletion.  Discharge Plan: Anticipate discharge tomorrow to home.    Code Status: Level 1 - Full Code    Subjective   Patient seen at bedside. Endorsing chronic knee pain, not worsening but not better either.  No other acute complaints.     Objective :  Temp:  [97.1 °F (36.2 °C)-98.3 °F (36.8 °C)] 97.5 °F (36.4 °C)  HR:  [76-98] 77  BP: (134-160)/() 134/92  Resp:  [18-20] 18  SpO2:  [90 %-98 %] 98 %  O2 Device: None (Room air)    Body mass index is 27.93 kg/m².     Input and Output Summary (last 24 hours):     Intake/Output Summary (Last 24 hours) at 5/27/2025 1023  Last data filed at 5/26/2025 2301  Gross per 24 hour   Intake 720 ml   Output 600 ml   Net 120 ml       Physical Exam  Constitutional:       General: He is not in acute distress.     Appearance: He is not ill-appearing, toxic-appearing or diaphoretic.   HENT:      Head: Normocephalic and atraumatic.      Nose: Nose normal.      Mouth/Throat:      Pharynx: Oropharynx is clear.     Eyes:      Conjunctiva/sclera: Conjunctivae normal.       Cardiovascular:      Rate and Rhythm: Normal rate and regular rhythm.      Heart sounds: No murmur heard.     No friction rub. No gallop.   Pulmonary:      Effort: Pulmonary effort is normal. No respiratory distress.   Abdominal:      General: Abdomen is flat. There is no distension.     Musculoskeletal:         General: Tenderness present. No swelling, deformity or signs of injury.      Cervical back: Normal range of motion.     Skin:     General: Skin is warm and dry.     Neurological:      Mental Status: He is alert and oriented to person, place, and time. Mental status is at baseline.     Psychiatric:         Mood and Affect: Mood normal.         Behavior: Behavior normal.         Lines/Drains:      Lab Results: I have reviewed the following results:   Results from last 7 days   Lab Units 05/27/25  0550 05/26/25  1007   WBC Thousand/uL 4.42 4.54   HEMOGLOBIN g/dL 12.1 13.7   HEMATOCRIT % 37.6 39.7   PLATELETS Thousands/uL 220 243   SEGS PCT %  --  52   LYMPHO PCT %  --  26   MONO PCT %  --  17*   EOS PCT %  --  4     Results from last 7 days   Lab Units 05/27/25  0550   SODIUM mmol/L 140   POTASSIUM mmol/L 3.1*    CHLORIDE mmol/L 106   CO2 mmol/L 25   BUN mg/dL 6   CREATININE mg/dL 0.58*   ANION GAP mmol/L 9   CALCIUM mg/dL 8.2*   ALBUMIN g/dL 3.5   TOTAL BILIRUBIN mg/dL 2.57*   ALK PHOS U/L 48   ALT U/L 16   AST U/L 20   GLUCOSE RANDOM mg/dL 82     Results from last 7 days   Lab Units 05/26/25  1007   INR  0.96                   Recent Cultures (last 7 days):         Imaging Results Review: I reviewed radiology reports from this admission including: chest xray, CT chest, and xray(s).  Other Study Results Review: No additional pertinent studies reviewed.    Last 24 Hours Medication List:     Current Facility-Administered Medications:     acetaminophen (TYLENOL) tablet 650 mg, Q6H PRN    Diclofenac Sodium (VOLTAREN) 1 % topical gel 2 g, 4x Daily    enoxaparin (LOVENOX) subcutaneous injection 40 mg, Daily    folic acid (FOLVITE) tablet 1 mg, Daily    ketorolac (TORADOL) injection 30 mg, Q6H PRN    magnesium sulfate 2 g/50 mL IVPB (premix) 2 g, Once, Last Rate: 2 g (05/27/25 0836)    methocarbamol (ROBAXIN) tablet 500 mg, Q6H PRN    multivitamin-minerals (CENTRUM) tablet 1 tablet, Daily    ondansetron (ZOFRAN) injection 4 mg, Q6H PRN    potassium chloride 20 mEq IVPB (premix), Once    sodium chloride 0.9 % infusion, Continuous, Last Rate: 100 mL/hr (05/27/25 0118)    thiamine tablet 100 mg, Daily    traZODone (DESYREL) tablet 50 mg, HS PRN    Administrative Statements   Today, Patient Was Seen By: Traci Galaviz PA-C  I have spent a total time of 30 minutes in caring for this patient on the day of the visit/encounter including Diagnostic results, Prognosis, Risks and benefits of tx options, Instructions for management, Patient and family education, Importance of tx compliance, Risk factor reductions, Impressions, Counseling / Coordination of care, Documenting in the medical record, Reviewing/placing orders in the medical record (including tests, medications, and/or procedures), Obtaining or reviewing history  , and Communicating  with other healthcare professionals .    **Please Note: This note may have been constructed using a voice recognition system.**

## 2025-05-27 NOTE — ASSESSMENT & PLAN NOTE
"Lab Results   Component Value Date    HGBA1C 5.7 (H) 03/14/2025       No results for input(s): \"POCGLU\" in the last 72 hours.    Blood Sugar Average: Last 72 hrs:  Currently on Ozempic 0.25 mg weekly   HGB A1C 5.7   Diabetic Diet   Can resume Ozempic on discharge   "

## 2025-05-27 NOTE — PROGRESS NOTES
05/27/25 1111   Referral Data   Referral Source Patient   Referral [facility]   Referral Reason Drug/Alcohol Abuse   County Information   County of Residence [place]   Readmission Root Cause   30 Day Readmission No   Patient Information   Mental Status Alert   Primary Caregiver Self   Accompanied by/Relationship n/a   Support System Immediate family;Friends   Orthodoxy/Cultural Requests Pt states he believes in God, but no affiliation   Legal Information   Legal Issues Pt pled guilty to defiant trespass in 1996 and Escape in 1998. He negotiated a plea for Escape in 2008 and was incarcerated. He pled guilty to flase identification to law enforcement,  Access Device Used To Obt Or Att Obt Prop/Service, Theft By Deception, Receiving Stolen Property in 2004 and was incarcerated and placed on probation. He denies any current legal issues.   Health Care Proxy Appointed No   Activities of Daily Living Prior to Admission   Functional Status Minimum assistance   Assistive Device Walker   Living Arrangement Apartment   Ambulation Independent   Access to Firearms   Access to Firearms No  (Pt denies. Stated he is not permitted to own firearms.)   Income Information   Income Source Pension/skilled nursing   Means of Transportation   Means of Transport to [facility]: Public Transportation - Bus

## 2025-05-27 NOTE — ASSESSMENT & PLAN NOTE
AM/CRS involved for aftercare planning and linkage to ongoing AUD treatment after discharge  Patient prescribed Tramadol for chronic pain, which he takes daily, and thus cannot take naltrexone

## 2025-05-27 NOTE — PROGRESS NOTES
"Progress Note - Medical Toxicology   Name: Christian Ball 70 y.o. male I MRN: [id number]  Unit/Bed#: 5T [facility]OX 505-01 I Date of Admission: 5/26/2025   Date of Service: 5/27/2025 I Hospital Day: 1    Assessment & Plan  Alcohol withdrawal syndrome without complication (HCC)  Withdrawal adequately controlled with phenobarbital.  Total phenobarbital: 1.23 grams  SEWS to be discontinued  Patient clear from a toxicology stand-point. Toxicology to sign off.   Continue supportive care, thiamine, folate.   Alcohol use disorder, severe, dependence (HCC)  AM/CRS involved for aftercare planning and linkage to ongoing AUD treatment after discharge  Patient prescribed Tramadol for chronic pain, which he takes daily, and thus cannot take naltrexone  Hypertension    Bipolar 1 disorder, depressed, severe (HCC)    Type 2 diabetes mellitus with ophthalmic complication, without long-term current use of insulin (HCC)  Lab Results   Component Value Date    HGBA1C 5.7 (H) 03/14/2025       No results for input(s): \"POCGLU\" in the last 72 hours.    Blood Sugar Average: Last 72 hrs:      Chronic knee pain    Hypokalemia    Chest pain      Reason for current consult: Alcohol withdrawal     Subjective   Patient lying in bed comfortably. States that he is feeling better and that his withdrawal symptoms are well controlled. Reports upper extremity tremor, however it was distractible on exam.     Objective :  Temp:  [97.1 °F (36.2 °C)-98.3 °F (36.8 °C)] 97.5 °F (36.4 °C)  HR:  [] 77  BP: (134-165)/() 134/92  Resp:  [18-20] 18  SpO2:  [90 %-98 %] 98 %  O2 Device: None (Room air)      Intake/Output Summary (Last 24 hours) at 5/27/2025 0916  Last data filed at 5/26/2025 2301  Gross per 24 hour   Intake 720 ml   Output 600 ml   Net 120 ml       Physical Exam  Constitutional:       Appearance: Normal appearance. He is normal weight.   HENT:      Head: Normocephalic.      Mouth/Throat:      Mouth: Mucous membranes are moist.      Pharynx: " Oropharynx is clear.     Eyes:      Extraocular Movements: Extraocular movements intact.      Conjunctiva/sclera: Conjunctivae normal.      Pupils: Pupils are equal, round, and reactive to light.       Cardiovascular:      Rate and Rhythm: Normal rate and regular rhythm.      Pulses: Normal pulses.      Heart sounds: Normal heart sounds.   Pulmonary:      Effort: Pulmonary effort is normal.      Breath sounds: Normal breath sounds.   Abdominal:      General: Abdomen is flat.      Palpations: Abdomen is soft.      Tenderness: There is no abdominal tenderness.     Musculoskeletal:         General: Normal range of motion.     Skin:     General: Skin is warm.      Capillary Refill: Capillary refill takes less than 2 seconds.     Neurological:      Mental Status: He is oriented to person, place, and time. Mental status is at baseline.      Comments: Upper extremity tremor that was distractible on exam  No tongue fasciculations    Psychiatric:         Mood and Affect: Mood normal.         Behavior: Behavior normal.           Lab Results: I have reviewed the following results:    Imaging Results Review: No pertinent imaging studies reviewed.  Other Study Results Review: No additional pertinent studies reviewed.    Administrative Statements   I have spent a total time of 15 minutes in caring for this patient on the day of the visit/encounter including Risks and benefits of tx options, Patient and family education, Importance of tx compliance, Risk factor reductions, and Impressions.

## 2025-05-28 VITALS
OXYGEN SATURATION: 94 % | WEIGHT: 205.91 LBS | TEMPERATURE: 97.5 F | SYSTOLIC BLOOD PRESSURE: 142 MMHG | DIASTOLIC BLOOD PRESSURE: 91 MMHG | BODY MASS INDEX: 27.89 KG/M2 | HEIGHT: 72 IN | RESPIRATION RATE: 17 BRPM | HEART RATE: 84 BPM

## 2025-05-28 PROBLEM — R07.9 CHEST PAIN: Status: RESOLVED | Noted: 2025-05-26 | Resolved: 2025-05-28

## 2025-05-28 PROBLEM — F10.930 ALCOHOL WITHDRAWAL SYNDROME WITHOUT COMPLICATION (HCC): Status: RESOLVED | Noted: 2025-03-16 | Resolved: 2025-05-28

## 2025-05-28 PROBLEM — E87.6 HYPOKALEMIA: Status: RESOLVED | Noted: 2025-05-26 | Resolved: 2025-05-28

## 2025-05-28 PROCEDURE — 99239 HOSP IP/OBS DSCHRG MGMT >30: CPT | Performed by: PHYSICIAN ASSISTANT

## 2025-05-28 RX ORDER — ACETAMINOPHEN 325 MG/1
650 TABLET ORAL EVERY 6 HOURS PRN
Qty: 30 TABLET | Refills: 0 | Status: SHIPPED | OUTPATIENT
Start: 2025-05-28

## 2025-05-28 RX ADMIN — ACETAMINOPHEN 650 MG: 325 TABLET, FILM COATED ORAL at 08:16

## 2025-05-28 RX ADMIN — MULTIPLE VITAMINS W/ MINERALS TAB 1 TABLET: TAB ORAL at 08:17

## 2025-05-28 RX ADMIN — FOLIC ACID 1 MG: 1 TABLET ORAL at 08:17

## 2025-05-28 RX ADMIN — METHOCARBAMOL 500 MG: 500 TABLET ORAL at 08:16

## 2025-05-28 RX ADMIN — THIAMINE HCL TAB 100 MG 100 MG: 100 TAB at 08:17

## 2025-05-28 NOTE — UTILIZATION REVIEW
NOTIFICATION OF INPATIENT MEDICAL ADMISSION   AUTHORIZATION REQUEST   SERVICING FACILITY:   [address] [address]  Tax ID: [id number]  NPI: [id number] ATTENDING PROVIDER:  Attending Name and NPI#: Neeraj Del Cid Md [[id number]]  Address: [address] [place] [address]  Phone: [phone]     ADMISSION INFORMATION:  Place of Service: Inpatient [facility]  Place of Service Code: 21  Inpatient Admission Date/Time: 5/26/25  2:45 PM  Discharge Date/Time: 5/28/2025 11:12 AM  Admitting Diagnosis Code/Description:  Alcohol abuse [F10.10]     UTILIZATION REVIEW CONTACT:  Hermelinda Priest Utilization   Network Utilization Review Department  Phone: [phone] Fax: [phone]  Email: [email or web address]  Contact for approvals/pending authorizations, clinical reviews, and discharge.     PHYSICIAN ADVISORY SERVICES:  Medical Necessity Denial & Hdyq-yd-Rhwv Review  Phone: [phone]  Fax: [phone]  Email: [email or web address]     DISCHARGE SUPPORT TEAM:  For Patients Discharge Needs & Updates  Phone: [phone] opt. 2 Fax: [phone]  Email: [email or web address]

## 2025-05-28 NOTE — PLAN OF CARE
Problem: PAIN - ADULT  Goal: Verbalizes/displays adequate comfort level or baseline comfort level  Description: Interventions:  - Encourage patient to monitor pain and request assistance  - Assess pain using appropriate pain scale  - Administer analgesics as ordered based on type and severity of pain and evaluate response  - Implement non-pharmacological measures as appropriate and evaluate response  - Consider cultural and social influences on pain and pain management  - Notify physician/advanced practitioner if interventions unsuccessful or patient reports new pain  - Educate patient/family on pain management process including their role and importance of  reporting pain   - Provide non-pharmacologic/complimentary pain relief interventions  Outcome: Progressing     Problem: SAFETY ADULT  Goal: Patient will remain free of falls  Description: INTERVENTIONS:  - Educate patient/family on patient safety including physical limitations  - Instruct patient to call for assistance with activity   - Consider consulting OT/PT to assist with strengthening/mobility based on AM PAC & JH-HLM score  - Consult OT/PT to assist with strengthening/mobility   - Keep Call bell within reach  - Keep bed low and locked with side rails adjusted as appropriate  - Keep care items and personal belongings within reach  - Initiate and maintain comfort rounds  - Apply yellow socks and bracelet for high fall risk patients  Outcome: Progressing     Problem: Knowledge Deficit  Goal: Patient/family/caregiver demonstrates understanding of disease process, treatment plan, medications, and discharge instructions  Description: Complete learning assessment and assess knowledge base.  Interventions:  - Provide teaching at level of understanding  - Provide teaching via preferred learning methods  Outcome: Progressing     Problem: SUBSTANCE USE/ABUSE  Goal: By discharge, will develop insight into their chemical dependency and sustain motivation to continue  in recovery  Description: INTERVENTIONS:  - Attends all daily group sessions and scheduled AA groups  - Actively practices coping skills through participation in the therapeutic community and adherence to program rules  - Reviews and completes assignments from individual treatment plan  - Assist patient development of understanding of their personal cycle of addiction and relapse triggers  Outcome: Progressing

## 2025-05-28 NOTE — DISCHARGE INSTR - APPOINTMENTS
Go to : Dasia Grullon MD   [facility]  Family Medicine  [address] [address]     Phone: [phone]    Appt 6/5/25 at 1:40 pm    Go to: Dr. Faby Zamora,   [facility]  Family Medicine  [address] [address]    Phone: [phone]    Appt 6/9/2025 at 8:00 am

## 2025-05-28 NOTE — ASSESSMENT & PLAN NOTE
Drinks a gallon of whiskey over 2 days   Previous admission to the \[facility]\"" Medical Detox Unit March 2025   Denies H/o withdrawal seizures  Reports previous withdrawal complicated by: Delirium Tremens   Cannot take naltrexone at this time as patient intermittently takes tramadol for chronic knee pain  Cont thiamine, folic acid, MVI on discharge  Consult case management/CRS for assistance with aftercare resources - outpatient resources

## 2025-05-28 NOTE — UTILIZATION REVIEW
Initial Clinical Review    Pt initially presented to [facility] ED. Pt was transferred by EMS to [facility] for medically managed detox.    Admission: Date/Time/Statement:   Admission Orders (From admission, onward)       Ordered        05/26/25 1446  Inpatient Admission  Once                          Orders Placed This Encounter   Procedures    Inpatient Admission     Standing Status:   Standing     Number of Occurrences:   1     Level of Care:   Med Surg [16]     Estimated length of stay:   More than 2 Midnights     Certification:   I certify that inpatient services are medically necessary for this patient for a duration of greater than two midnights. See H&P and MD Progress Notes for additional information about the patient's course of treatment.        Initial Presentation: 70 y.o. male who presented to [facility]. Inpatient admission for evaluation and treatment of alcohol withdrawal syndrome. PMHx: T2DM, HTN, psychiatric disorder, chronic knee pain. Presented w/ R sided chest pain. EKG without ischemic changes. CTA pulmonary arterial HTN noted. Pt also seeking detox from alcohol. Exam: tremors, tachycardia, anxiety. Mag 1.8. Plan: IVF, telemetry, continuous pulse ox, continue PTA meds except anti-hypertensives, trend labs, replete electrolytes as needed; I&O, fall & seizure precautions. IV valium 10 mg x1. Toxicology consulted.     Anticipated Length of Stay: Patient will be admitted on an inpatient basis with an anticipated length of stay of greater than 2 midnights secondary to alcohol withdrawal.     Toxicology: Presented w/ need for detox from alcohol. Serum ETOH: 33. Notes drinking s/t chronic R knee pain, has outpatient orthopedic appt scheduled. Reports half a gallon of whiskey daily, last drink on 5/25 @ 0200. Has prior rehab treatment for withdrawal.  On exam, headaches, tremors, anxiety, diaphoretic, tachycardic, R knee decreased ROM w/ tenderness. SEWS 13.  Plan: SEWS monitoring w/ phenobarbital management, IV thiamine/folic acid supplement.       Date: 5/27       Day 2: Pt reports chronic knee pain that is not worsening. On exam, R knee tender. SEWS 5. Plan: continue SEWS monitoring w/ phenobarbital management, IVF, PO thiamine/folic acid supplement, telemetry, continuous pulse ox, continue current meds, trend labs, replete electrolytes as needed. Received 1,040 mg IV & 64.8 mg PO phenobarbital since admission. IV KCl 20 mEq x1.        Date: 05/28/25  Day 3: Has surpassed a 2nd midnight with active treatments and services. Pt discharged to home w/ outpatient follow-up and resources. Ortho op appt scheduled, referral to outpatient pain management and pulmonology. Continue thiamine, folic acid, multivitamin. Continue other current meds. Cannot take naltrexone s/t intermittent use of tramadol for chronic knee pain.       Scheduled Medications:  Diclofenac Sodium, 2 g, Topical, 4x Daily  enoxaparin, 40 mg, Subcutaneous, Daily  folic acid, 1 mg, Oral, Daily  multivitamin-minerals, 1 tablet, Oral, Daily  thiamine, 100 mg, Oral, Daily    Continuous IV Infusions:  sodium chloride 0.9 %, 100 mL/hr, Intravenous, Continuous; Stopped (05/27/25 1450)     PRN Meds:  acetaminophen, 650 mg, Oral, Q6H PRN; 5/26 x1, 5/27 x3, 5/28 x1  ketorolac, 30 mg, Intravenous, Q6H PRN; 5/26 x1, 5/27 x2  loperamide, 2 mg, Oral, 4x Daily PRN; 5/27 x1  magnesium sulfate, 2 g, Intravenous; 5/27 x1  methocarbamol, 500 mg, Oral, Q6H PRN; 5/26 x1, 5/27 x3, 5/28 x1  ondansetron, 4 mg, Intravenous, Q6H PRN  traZODone, 50 mg, Oral, HS PRN  diazepam, 10 mg, Intravenous; 5/26 x1  phenobarbital, 650 mg, Intravenous; 5/26 x1  phenobarbital, 130 mg, Intravenous; 5/26 x2, 5/27 x1  phenobarbital, 64.8 mg, Oral; 5/27 x1  potassium chloride, 40 mEq, Oral; 5/27 x1  potassium chloride, 20 mEq, Intravenous; 5/27 x1        ED Triage Vitals [05/26/25 1450]   Temperature Pulse Respirations Blood Pressure SpO2 Pain Score    97.8 °F (36.6 °C) 90 18 (!) 152/106 96 % 5     Weight (last 2 days)       Date/Time Weight    05/26/25 1450 93.4 (205.91)              Vital Signs (last 3 days)       Date/Time Temp Pulse Resp BP MAP (mmHg) SpO2 O2 Device Patient Position - Orthostatic VS Delicia Coma Scale Score SEWS Total Score Pain    05/28/25 0816 -- -- -- -- -- -- -- -- -- -- 8    05/28/25 0717 97.5 °F (36.4 °C) 84 17 142/91 108 94 % None (Room air) Lying -- -- --    05/28/25 0442 97.5 °F (36.4 °C) 101 18 143/94 110 97 % None (Room air) Sitting -- -- --    05/27/25 2215 -- -- -- -- -- -- -- -- -- -- 5 05/27/25 2100 -- -- -- -- -- -- -- -- 15 -- --    05/27/25 1921 -- -- -- -- -- -- -- -- -- -- 8    05/27/25 1449 97.4 °F (36.3 °C) 83 18 152/87 -- -- -- -- -- -- --    05/27/25 1406 -- -- -- -- -- -- -- -- -- -- 7    05/27/25 1124 97.2 °F (36.2 °C) 85 18 140/84 -- -- -- Lying -- -- --    05/27/25 0900 -- -- -- -- -- -- -- -- 15 -- --    05/27/25 0800 -- -- -- -- -- -- -- -- -- 5 --    05/27/25 0759 -- -- -- -- -- -- -- -- -- -- 5    05/27/25 0753 97.5 °F (36.4 °C) 77 18 134/92 -- -- -- Lying -- -- --    05/27/25 0613 97.2 °F (36.2 °C) 76 18 147/94 -- 98 % -- -- -- -- --    05/27/25 0600 -- -- -- -- -- -- -- -- -- 3 --    05/27/25 0554 -- -- -- -- -- -- -- -- -- -- 9    05/27/25 0518 97.1 °F (36.2 °C) 76 18 159/103 121 92 % None (Room air) Lying -- -- --    05/27/25 0515 -- -- -- -- -- -- -- -- -- 7 --    05/27/25 0229 -- -- -- -- -- -- -- -- -- -- 6    05/27/25 0115 -- -- -- -- -- -- -- -- -- 0 --    05/27/25 0015 -- -- -- -- -- -- -- -- -- 0 --    05/27/25 0005 98.2 °F (36.8 °C) 80 18 134/83 100 95 % None (Room air) Lying -- -- --    05/26/25 2333 -- -- -- -- -- -- -- -- -- 8 --    05/26/25 2318 -- -- -- -- -- -- -- -- -- -- 10 - Worst Possible Pain    05/26/25 2311 97.8 °F (36.6 °C) 80 20 158/97 117 90 % None (Room air) Lying -- -- --    05/26/25 2130 -- 86 20 144/82 -- 92 % None (Room air) Lying 15 0 --    05/26/25 2101 98.1 °F (36.7 °C)  78 20 144/82 -- 92 % -- -- -- -- --    05/26/25 2015 -- -- -- -- -- -- -- -- -- 8 --    05/26/25 1947 -- -- -- -- -- -- -- -- -- -- 8    05/26/25 1924 98.3 °F (36.8 °C) 82 18 142/84 103 98 % None (Room air) Lying -- -- --    05/26/25 1800 -- -- -- -- -- -- -- -- -- 0 --    05/26/25 1601 98.1 °F (36.7 °C) 89 18 148/89 -- 96 % None (Room air) Lying -- -- --    05/26/25 1600 -- -- -- -- -- -- -- -- -- 0 --    05/26/25 1451 -- -- -- -- -- -- -- -- -- 14 --    05/26/25 1450 97.8 °F (36.6 °C) 90 18 152/106 121 96 % None (Room air) Lying -- -- 5            SEWS:   Row Name 05/27/25 0800 05/27/25 0754 05/27/25 0600 05/27/25 0515 05/27/25 0115   Severity of Ethanol Withdrawal Scale (SEWS)   ANXIETY: Do you feel that something bad is about to happen to you right now? 0  -CN --  -CN 0  -BL 0  -BL 0  -BL   NAUSEA and DRY HEAVES or VOMITING? 0  -CN --  -CN 0  -BL 0  -BL 0  -BL   SWEATING: (includes moist palms, sweating now)? Score 0 or 2 0  -CN --  -CN 0  -BL 2  -BL 0  -BL   TREMOR: with arms extended eyes closed? 2  -CN --  -CN 0  -BL 2  -BL 0  -BL   AGITATION: Fidgety, restless, pacing? 0  -CN --  -CN 0  -BL 0  -BL 0  -BL   DISORIENTATION: 0  -CN --  -CN 0  -BL 0  -BL 0  -BL   HALLUCINATIONS: 0  -CN --  -CN 0  -BL 0  -BL 0  -BL   VITAL SIGNS: ANY (Pulse >110, Diastolic BP >90, Temp >99.6) 3  -CN --  -CN 3  -BL 3  -BL 0  -BL   SEWS Total Score 5  -CN --  -CN 3  -BL 7  -BL 0  -BL   Walton Agitation Sedation Scale (RASS)   [place] Agitation Sedation Scale (RASS) 0  -CN --  -CN 0  -BL 0  -BL -2  -BL   Row Name 05/27/25 0015 05/26/25 2333 05/26/25 2130 05/26/25 2015 05/26/25 1800   Severity of Ethanol Withdrawal Scale (SEWS)   ANXIETY: Do you feel that something bad is about to happen to you right now? 0  -BL 3  -BL 0  -BL 3  -BL 0  -BH   NAUSEA and DRY HEAVES or VOMITING? 0  -BL 0  -BL 0  -BL 0  -BL 0  -BH   SWEATING: (includes moist palms, sweating now)? Score 0 or 2 0  -BL 0  -BL 0  -BL 0  -BL 0  -BH   TREMOR: with arms  extended eyes closed? 0  -BL 2  -BL 0  -BL 2  -BL 0  -BH   AGITATION: Fidgety, restless, pacing? 0  -BL 0  -BL 0  -BL 3  -BL 0  -BH   DISORIENTATION: 0  -BL 0  -BL 0  -BL 0  -BL 0  -BH   HALLUCINATIONS: 0  -BL 0  -BL 0  -BL 0  -BL 0  -BH   VITAL SIGNS: ANY (Pulse >110, Diastolic BP >90, Temp >99.6) 0  -BL 3  -BL 0  -BL 0  -BL 0  -BH   SEWS Total Score 0  -BL 8  -BL 0  -BL 8  -BL 0  -BH   [place] Agitation Sedation Scale (RASS)   [place] Agitation Sedation Scale (RASS) -2  -BL 0  -BL -1  -BL +1  -BL -2  -BH     Row Name 05/26/25 1600 05/26/25 1451      Severity of Ethanol Withdrawal Scale (SEWS)   ANXIETY: Do you feel that something bad is about to happen to you right now? 0  -BH 3  -BH      NAUSEA and DRY HEAVES or VOMITING? 0  -BH 3  -BH      SWEATING: (includes moist palms, sweating now)? Score 0 or 2 0  -BH 0  -BH      TREMOR: with arms extended eyes closed? 0  -BH 2  -BH      AGITATION: Fidgety, restless, pacing? 0  -BH 3  -BH      DISORIENTATION: 0  -BH 0  -BH      HALLUCINATIONS: 0  -BH 0  -BH      VITAL SIGNS: ANY (Pulse >110, Diastolic BP >90, Temp >99.6) 0  -BH 3  -BH      SEWS Total Score 0  -BH 14  -BH          Pertinent Labs/Diagnostic Test Results:     Results from last 7 days   Lab Units 05/27/25  0550 05/26/25  1007   WBC Thousand/uL 4.42 4.54   HEMOGLOBIN g/dL 12.1 13.7   HEMATOCRIT % 37.6 39.7   PLATELETS Thousands/uL 220 243   TOTAL NEUT ABS Thousands/µL  --  2.33         Results from last 7 days   Lab Units 05/27/25  0550 05/26/25  1007   SODIUM mmol/L 140 140   POTASSIUM mmol/L 3.1* 3.4*   CHLORIDE mmol/L 106 104   CO2 mmol/L 25 22   ANION GAP mmol/L 9 14*   BUN mg/dL 6 10   CREATININE mg/dL 0.58* 0.76   EGFR ml/min/1.73sq m 101 91   CALCIUM mg/dL 8.2* 9.3   MAGNESIUM mg/dL 1.9 1.8*     Results from last 7 days   Lab Units 05/27/25  0550 05/26/25  1007   AST U/L 20 28   ALT U/L 16 22   ALK PHOS U/L 48 56   TOTAL PROTEIN g/dL 6.0* 7.2   ALBUMIN g/dL 3.5 4.3   TOTAL BILIRUBIN mg/dL 2.57* 1.96*          Results from last 7 days   Lab Units 05/27/25  0550 05/26/25  1007   GLUCOSE RANDOM mg/dL 82 112     Results from last 7 days   Lab Units 05/26/25  1205 05/26/25  1007   HS TNI 0HR ng/L  --  19   HS TNI 2HR ng/L 19  --    HSTNI D2 ng/L 0  --      Results from last 7 days   Lab Units 05/26/25  1007   D-DIMER QUANTITATIVE ug/ml FEU 0.95*     Results from last 7 days   Lab Units 05/26/25  1007   PROTIME seconds 13.0   INR  0.96   PTT seconds 26     Results from last 7 days   Lab Units 05/26/25  1007   TSH 3RD GENERATON uIU/mL 1.345     Results from last 7 days   Lab Units 05/26/25  1007   BNP pg/mL 18     Results from last 7 days   Lab Units 05/26/25  1007   LIPASE u/L 27     Results from last 7 days   Lab Units 05/26/25  1007   ETHANOL LVL mg/dL 33*         Past Medical History[1]  Present on Admission:   (Resolved) Alcohol withdrawal syndrome without complication (HCC)   Alcohol use disorder, severe, dependence (HCC)   Bipolar 1 disorder, depressed, severe (HCC)   Type 2 diabetes mellitus with ophthalmic complication, without long-term current use of insulin (HCC)   Chronic knee pain   Hypertension      Admitting Diagnosis: Alcohol abuse [F10.10]  Age/Sex: 70 y.o. male      SEWS PHENOBARBITAL PROTOCOL FOR ALCOHOL WITHDRAWAL  Admit patient to medical detox unit and continue supportive care and stabilization of acute ethanol withdrawal per medical toxicology/detox treatment pathway. Monitor ethanol withdrawal severity via the Severity of Ethanol Withdrawal Scale (SEWS) Q4 hours and then hourly if/when SEWS > 6. Treat withdrawal per pathway and reassess Q30-60 minutes.          Mild SEWS Score 1-6  Administer medications* (IV or PO; PO preferred):  If initial SEWS score: diazepam 10mg PO/IV x 1 AND phenobarbital 65 mg PO/IV x 1  If repeat SEWS score 1-6: phenobarbital 65 mg PO/IV q1 hour x 5 doses maximum   Reassessment:   SEWS q1 hour after each dose until SEWS 0 x 2 hours  VS q1 hours (until SEWS 0, then q4  hours)  Notify provider for bedside evaluation if 5-dose maximum is reached, RASS of -3 to -5, or SEWS score escalates to moderate or severe.   Moderate SEWS Score 7-12  Administer medications* (IV):  If initial SEWS score: diazepam 10mg IV x 1 AND phenobarbital 260 mg IV x 1  If repeat SEWS score 7-12 or score escalated from mild: phenobarbital 130 mg IV q30 minutes x 5 doses maximum   Reassessment:  SEWS q30 minutes after each dose until SEWS < 7 (then hourly until SEWS 0 x 2 hours)  VS q30 minutes until SEWS < 7 (then hourly until SEWS 0, then q4 hours)  Notify provider for bedside evaluation if 5-dose maximum is reached, RASS of -3 to -5, or SEWS score escalates to severe.   Severe SEWS Score >= 13  Administer medications* (IV):  If initial SEWS score: Diazepam 10 mg IV x 1 AND phenobarbital 650 mg IV piggyback x 1 over 15-30 minutes  If repeat SEWS score >= 13 or score escalated from mild or moderate: phenobarbital 130 mg IV q30 minutes x 5 doses maximum   Reassessment:  SEWS q30 minutes after each dose until SEWS < 7 (then hourly until SEWS 0 x 2 hours)   VS q30 minutes until SEWS < 7 (then hourly until SEWS 0, then q4 hours)  Notify provider for bedside evaluation if 5-dose maximum is reached or RASS of -3 to -5   *Hold medications and notify provider if CNS depression, respirations < 10/min, or RASS of -3 to -5.        Network Utilization Review Department  ATTENTION: Please call with any questions or concerns to [phone] and carefully listen to the prompts so that you are directed to the right person. All voicemails are confidential.   For Discharge needs, contact Care Management DC Support Team at [phone] opt. 2  Send all requests for admission clinical reviews, approved or denied determinations and any other requests to dedicated fax number below belonging to the campus where the patient is receiving treatment. List of dedicated fax numbers for the Facilities:  FACILITY NAME UR FAX NUMBER    ADMISSION DENIALS (Administrative/Medical Necessity) [phone]   DISCHARGE SUPPORT TEAM (NETWORK) [phone]   PARENT CHILD HEALTH (Maternity/NICU/Pediatrics) [phone]   [facility] [phone]   [facility] [phone]   [facility] [phone]   [facility] [phone]   [facility] [phone]   [facility] [phone]   [facility] [phone]   [facility] [phone]   [facility] [phone]   Critical access hospital [phone]   [facility] [phone]   [facility] [phone]              [1]   Past Medical History:  Diagnosis Date    Anxiety     Depression     Diabetes mellitus (HCC)     Hypertension     PTSD (post-traumatic stress disorder)

## 2025-05-28 NOTE — PLAN OF CARE
Problem: PAIN - ADULT  Goal: Verbalizes/displays adequate comfort level or baseline comfort level  Description: Interventions:  - Encourage patient to monitor pain and request assistance  - Assess pain using appropriate pain scale  - Administer analgesics as ordered based on type and severity of pain and evaluate response  - Implement non-pharmacological measures as appropriate and evaluate response  - Consider cultural and social influences on pain and pain management  - Notify physician/advanced practitioner if interventions unsuccessful or patient reports new pain  - Educate patient/family on pain management process including their role and importance of  reporting pain   - Provide non-pharmacologic/complimentary pain relief interventions  Outcome: Adequate for Discharge     Problem: SAFETY ADULT  Goal: Patient will remain free of falls  Description: INTERVENTIONS:  - Educate patient/family on patient safety including physical limitations  - Instruct patient to call for assistance with activity   - Consider consulting OT/PT to assist with strengthening/mobility based on AM PAC & JH-HLM score  - Consult OT/PT to assist with strengthening/mobility   - Keep Call bell within reach  - Keep bed low and locked with side rails adjusted as appropriate  - Keep care items and personal belongings within reach  - Initiate and maintain comfort rounds  - Apply yellow socks and bracelet for high fall risk patients  Outcome: Adequate for Discharge  Goal: Maintain or return to baseline ADL function  Description: INTERVENTIONS:  - Assess patient's need for assistive devices and provide as appropriate  - Encourage maximum independence but intervene and supervise when necessary  - Consider OT consult to assist with ADL evaluation and planning for discharge  - Provide patient education as appropriate  - Monitor functional capacity and physical performance, use of AM PAC & JH-HLM   - Monitor gait, balance and fatigue with  ambulation    Outcome: Adequate for Discharge  Goal: Maintains/Returns to pre admission functional level  Description: INTERVENTIONS:  - Perform AM-PAC 6 Click Basic Mobility/ Daily Activity assessment daily.  - Set and communicate daily mobility goal to care team and patient/family/caregiver.   - Collaborate with rehabilitation services on mobility goals if consulted  - Out of bed for toileting  Outcome: Adequate for Discharge     Problem: DISCHARGE PLANNING  Goal: Discharge to home or other facility with appropriate resources  Description: INTERVENTIONS:  - Identify barriers to discharge w/patient and caregiver  - Arrange for needed discharge resources and transportation as appropriate  - Identify discharge learning needs (meds, wound care, etc.)  - Arrange for interpretive services to assist at discharge as needed  - Refer to Case Management Department for coordinating discharge planning if the patient needs post-hospital services based on physician/advanced practitioner order or complex needs related to functional status, cognitive ability, or social support system  Outcome: Adequate for Discharge     Problem: Knowledge Deficit  Goal: Patient/family/caregiver demonstrates understanding of disease process, treatment plan, medications, and discharge instructions  Description: Complete learning assessment and assess knowledge base.  Interventions:  - Provide teaching at level of understanding  - Provide teaching via preferred learning methods  Outcome: Adequate for Discharge     Problem: SUBSTANCE USE/ABUSE  Goal: By discharge, will develop insight into their chemical dependency and sustain motivation to continue in recovery  Description: INTERVENTIONS:  - Attends all daily group sessions and scheduled AA groups  - Actively practices coping skills through participation in the therapeutic community and adherence to program rules  - Reviews and completes assignments from individual treatment plan  - Assist patient  development of understanding of their personal cycle of addiction and relapse triggers  Outcome: Adequate for Discharge  Goal: By discharge, patient will have ongoing treatment plan addressing chemical dependency  Description: INTERVENTIONS:  - Assist patient with resources and/or appointments for ongoing recovery based living  Outcome: Adequate for Discharge

## 2025-05-28 NOTE — SOCIAL WORK
"CM spoke to pt this morning re: continuing care. Pt noted he felt one of his triggers for drinking was chronic pain. He was also recently admitted in March 2025 at which time he also reported daily alcohol use. During that admission, he was being seen on an outpatient basis for counseling and methadone maintenance at [facility], but during hospital stay, pt reported he would like to wean off Methadone.     When CM spoke to pt this morning, he was asked if he felt Methadone was helping with his chronic pain. Pt stated, \"When I was on it, I didn't think it was, but now that I don't have it, I think it was helping. Pt also has some transportation barriers to going to methadone clinic daily.     He also named having someone to talk to as a support that helped him maintain sobriety in the past. He initially stated that he did not want to be scheduled with an individual counselor, but as discussion continued, he stated, \"Maybe I should have a counselor.\" Pt ultimately stated he wanted to \"talk care of his pain first\" before scheduling individual therapy.  CM did provide several OP therapy providers to pt including [facility], [facility] [place] and [facility].     CM asked him about scheduling an appt for pain management. He stated, \"My PCP will handle that.\" Pt has PCP appts scheduled and orthopedic appt scheduled as well. Ambulatory referral for pain management placed.    "

## 2025-05-28 NOTE — NURSING NOTE
Gave after visit summary and discussed appointments and medication education and next dose due times. Patient verbalized understanding. IV removed and catheter intact. All belongings returned to patient. No belongings in security or pharmacy. Patient dressed in street clothes. Taken by PCA to lobby for ride home.

## 2025-05-28 NOTE — DISCHARGE SUMMARY
"Discharge Summary - Hospitalist   Name: Christian Ball 70 y.o. male I MRN: [id number]  Unit/Bed#: 5T DETOX 505-01 I Date of Admission: 5/26/2025   Date of Service: 5/28/2025 I Hospital Day: 2     Assessment & Plan  Alcohol use disorder, severe, dependence (HCC)  Drinks a gallon of whiskey over 2 days   Previous admission to the \[facility]\"" Medical Detox Unit March 2025   Denies H/o withdrawal seizures  Reports previous withdrawal complicated by: Delirium Tremens   Cannot take naltrexone at this time as patient intermittently takes tramadol for chronic knee pain  Cont thiamine, folic acid, MVI on discharge  Consult case management/CRS for assistance with aftercare resources - outpatient resources   Hypertension  Patient with a history of HTN  Home medication regimen: amlodipine 10 mg, lisinopril 20 mg   Can resume home medication regimen on discharge  Bipolar 1 disorder, depressed, severe (HCC)  Previously on gabapentin 300 mg TID and latuda 60 mg   He is currently not taking any psychiatric medication at this time   Evaluated by psychiatry during last admission who recommended outpatient follow up   Type 2 diabetes mellitus with ophthalmic complication, without long-term current use of insulin ([facility])  Lab Results   Component Value Date    HGBA1C 5.7 (H) 03/14/2025       No results for input(s): \"POCGLU\" in the last 72 hours.    Blood Sugar Average: Last 72 hrs:  Currently on Ozempic 0.25 mg weekly   HGB A1C 5.7   Diabetic Diet   Can resume Ozempic on discharge   Chronic knee pain  Patient has been evaluated in the ED multiple times secondary to chronic right knee pain  Was evaluated in [facility] ED on 5/15 and 5/24   Did have fluid aspiration on 5/15   Referred to outpatient ortho, has appt scheduled  XR R knee: IMPRESSION: Degenerative changes of the right knee.   PT eval and treat   Will treat with tylenol and toradol as patient has history of opioid use disorder and this pain has been chronic since March  Continue topical " voltaren on discharge   Referral to pain management outpatient placed  Alcohol withdrawal syndrome without complication (HCC) (Resolved: 5/28/2025)  Last drink: 5/25 AM   Ethanol lvl: 33 10:07 am  Received Valium 2.5 mg of valium   Toxicology consulted: appreciate recommendations   Can discontinue SEWS protocol   Total phenobarbital administered: 1104.8mg  Total diazepam administered: 12.5mg   Hypokalemia (Resolved: 5/28/2025)  Decreased from 3.4 on admission to 3.1 on 5/27   Will replete with IV and PO supplementation  Replete with magnesium as well   Chest pain (Resolved: 5/28/2025)  Endorsing right sided chest pain in the ED   EKG without acute ischemic changes   CXR: Prominence of the right pulmonary hilum. Further evaluation is recommended with CT of the chest.  D-Dimer 0.95   CTA: No pulmonary embolism. Dilated main pulmonary artery consistent with pulmonary arterial hypertension.Fatty liver.  Patient will require outpatient pulmonology appointment upon discharge      Medical Problems       Resolved Problems  Date Reviewed: 5/26/2025          Resolved    * (Principal) Alcohol withdrawal syndrome without complication (HCC) 5/28/2025     Resolved by  Traci Galaviz PA-C    Hypokalemia 5/28/2025     Resolved by  Traci Galaviz PA-C    Chest pain 5/28/2025     Resolved by  Traci Galaviz PA-C        Discharging Physician / Practitioner: Traci Galaviz PA-C  PCP: Chloe Ren MD  Admission Date:   Admission Orders (From admission, onward)       Ordered        05/26/25 1446  Inpatient Admission  Once                          Discharge Date: 05/28/25    Next Steps for Physician/AP Assuming Care:  Orthopedic follow-up  Pain management referral   Pulmonology referral    Test Results Pending at Discharge (will require follow up):  None    Medication Changes for Discharge & Rationale:   None  See after visit summary for reconciled discharge medications provided to patient and/or family.     Consultations During Hospital  "Stay:  Toxicology    Procedures Performed:   None    Significant Findings / Test Results:   CTA chest - \"No pulmonary embolism. Dilated main pulmonary artery c/w pulmonary arterial HTN. Fatty liver.\"  XR knee Right - \"Trace effusion. Moderate tricompartmental osteoarthritis.\"     Incidental Findings:   I reviewed the above mentioned incidental findings with the patient and/or family and they expressed understanding.    Hospital Course:   Christian Ball is a 70 y.o. male patient who originally presented to the hospital on 5/26/2025 due to acute alcohol withdrawal. Initial presentation to [facility] ED requesting alcohol detox with sx of tachycardia, HTN, tremors, anxiety. On arrival to ED, patient also endorsed CP. ED work-up negative for MI, PE. CTA found dilated pulmonary artery c/w PAH. Admitted to  withdrawal management unit for further care. SEWS protocol initiated, toxicology consulted as above. Patient withdrawal symptoms resolved, cleared by toxicology for discharge home. Throughout stay, pt c/o chronic R knee pain. Xrays found moderate osteoarthritis. Patient followed by orthopedics outpatient, has appointment scheduled after discharge home.     Please see above list of diagnoses and related plan for additional information.     Discharge Day Visit / Exam:   Subjective:  Patient seen resting in bed. States he is ready to go home.   Vitals: Blood Pressure: 142/91 (05/28/25 0717)  Pulse: 84 (05/28/25 0717)  Temperature: 97.5 °F (36.4 °C) (05/28/25 0717)  Temp Source: Temporal (05/28/25 0717)  Respirations: 17 (05/28/25 0717)  Height: 6' (182.9 cm) (05/26/25 1450)  Weight - Scale: 93.4 kg (205 lb 14.6 oz) (05/26/25 1450)  SpO2: 94 % (05/28/25 0717)  Physical Exam  Constitutional:       General: He is not in acute distress.     Appearance: He is not ill-appearing, toxic-appearing or diaphoretic.   HENT:      Head: Normocephalic and atraumatic.      Nose: Nose normal.      Mouth/Throat:      Pharynx: Oropharynx is " clear.     Eyes:      Conjunctiva/sclera: Conjunctivae normal.       Cardiovascular:      Rate and Rhythm: Normal rate and regular rhythm.      Heart sounds: No murmur heard.     No friction rub. No gallop.   Pulmonary:      Effort: Pulmonary effort is normal. No respiratory distress.   Abdominal:      General: Abdomen is flat. There is no distension.     Musculoskeletal:         General: Tenderness present. No swelling, deformity or signs of injury.      Cervical back: Normal range of motion.     Skin:     General: Skin is warm and dry.     Neurological:      Mental Status: He is alert and oriented to person, place, and time. Mental status is at baseline.     Psychiatric:         Mood and Affect: Mood normal.         Behavior: Behavior normal.          Discussion with Family: Patient declined call to .     Discharge instructions/Information to patient and family:   See after visit summary for information provided to patient and family.      Provisions for Follow-Up Care:  See after visit summary for information related to follow-up care and any pertinent home health orders.      Mobility at time of Discharge:   Basic Mobility Inpatient Raw Score: 20  JH-HLM Goal: 6: Walk 10 steps or more  JH-HLM Achieved: 7: Walk 25 feet or more  HLM Goal achieved. Continue to encourage appropriate mobility.     Disposition:   Home    Planned Readmission: N/A    Administrative Statements   Discharge Statement:  I have spent a total time of 30 minutes in caring for this patient on the day of the visit/encounter.    **Please Note: This note may have been constructed using a voice recognition system**

## 2025-05-28 NOTE — CERTIFIED RECOVERY SPECIALIST
Certified  Note      Name: Christian Ball 70 y.o. male I MRN: [id number]  Unit/Bed#: 5T DETOX 505-01 I Date of Admission: 5/26/2025   Date of Service: 5/28/2025 I Hospital Day: 2    Summary of Contact   Resources provided for patient being dc'd     Follow up: NA          Administrative Statements  I have spent a total time of 3 minutes in caring for this patient on the day of the visit/encounter.    Charis Solano

## 2025-05-28 NOTE — PROGRESS NOTES
05/28/25 7244   Other Referral/Resources/Interventions Provided:   Financial Resources Provided Indigent Transportation   Referrals Provided: AuntBertha;Crisis Hotline;Other (Specify);Support Group;Therapist  (IP and OP AUD treatment supports)   Other Transportation   Discharge Communications   Discharge planning discussed with: pt   Freedom of Choice Yes   Comments - Freedom of Choice Pt declined continuing care for AUD treatment. He was given several options for OP therapy. He was scheduled for several medical providers and CM called PCP office for continuity of care.   CM contacted family/caregiver? No- see comments  (Pt declined to sign FLORINA for family/emergency contact.)   Were Treatment Team discharge recommendations reviewed with patient/caregiver? Yes   Were patient/caregiver advised of the risks associated with not following Treatment Team discharge recommendations? Yes   Contacts   Patient Contacts Ravi Morse (Sports Medicine), PCP   Contact Method Phone   Phone Number [phone], [phone]   Reason/Outcome Continuity of Care;Referral;Discharge Planning     CM was notified by medical provider that pt was medically stable for discharge. Pt to discharge today. Pt denies any withdrawal symptoms at this time. Pt to discharge to home and was provided with Lyft upon discharge. Pt to follow up with Ravi Morse (Sports Medicine) on 5/30/25 at 9:15 amand with PCP at [facility] on 6/5/25 at 1:40 p, and on 6/9/25 at 8 am. Pt to follow up with pain management referral. Pt was also given several options for OP therapy for AUD treatment. He declined appt be made at this time. Pt's meds were sent to preferred pharmacy.     Discharge Address: [address] [place] [id number]     Phone Number: [phone]

## 2025-05-28 NOTE — ASSESSMENT & PLAN NOTE
Patient has been evaluated in the ED multiple times secondary to chronic right knee pain  Was evaluated in [facility] ED on 5/15 and 5/24   Did have fluid aspiration on 5/15   Referred to outpatient ortho, has appt scheduled  XR R knee: IMPRESSION: Degenerative changes of the right knee.   PT eval and treat   Will treat with tylenol and toradol as patient has history of opioid use disorder and this pain has been chronic since March  Continue topical voltaren on discharge   Referral to pain management outpatient placed

## 2025-05-29 NOTE — UTILIZATION REVIEW
NOTIFICATION OF ADMISSION DISCHARGE   This is a Notification of Discharge from [facility]. Please be advised that this patient has been discharge from our facility. Below you will find the admission and discharge date and time including the patient’s disposition.   UTILIZATION REVIEW CONTACT:  Utilization Review Assistants  Network Utilization Review Department  Phone: [phone] x carefully listen to the prompts. All voicemails are confidential.  Email: [email or web address]     ADMISSION INFORMATION  PRESENTATION DATE: 5/26/2025  2:45 PM  OBERVATION ADMISSION DATE: N/A  INPATIENT ADMISSION DATE: 5/26/25  2:45 PM   DISCHARGE DATE: 5/28/2025 11:12 AM   DISPOSITION:Home/Self Care    Network Utilization Review Department  ATTENTION: Please call with any questions or concerns to [phone] and carefully listen to the prompts so that you are directed to the right person. All voicemails are confidential.   For Discharge needs, contact Care Management DC Support Team at [phone] opt. 2  Send all requests for admission clinical reviews, approved or denied determinations and any other requests to dedicated fax number below belonging to the campus where the patient is receiving treatment. List of dedicated fax numbers for the Facilities:  FACILITY NAME UR FAX NUMBER   ADMISSION DENIALS (Administrative/Medical Necessity) [phone]   DISCHARGE SUPPORT TEAM ([facility]) [phone]   PARENT CHILD HEALTH (Maternity/NICU/Pediatrics) [phone]   [facility] [phone]   [facility] [phone]   [facility] [phone]   [facility] [phone]   [facility] [phone]   [facility] [phone]   [facility] [phone]   [facility] [phone]   [facility]  [facility] [phone]   [facility] [phone]   [facility] [phone]   [facility] [phone]

## 2025-07-19 ENCOUNTER — TELEPHONE (OUTPATIENT)
Dept: OTHER | Facility: OTHER | Age: 70
End: 2025-07-19

## 2025-08-08 ENCOUNTER — APPOINTMENT (OUTPATIENT)
Dept: RADIOLOGY | Facility: MEDICAL CENTER | Age: 70
End: 2025-08-08
Attending: ORTHOPAEDIC SURGERY
Payer: MEDICARE

## 2025-08-08 VITALS — HEIGHT: 72 IN | WEIGHT: 205 LBS | BODY MASS INDEX: 27.77 KG/M2

## 2025-08-08 DIAGNOSIS — M17.11 PRIMARY OSTEOARTHRITIS OF RIGHT KNEE: Primary | ICD-10-CM

## 2025-08-08 DIAGNOSIS — Z01.89 ENCOUNTER FOR LOWER EXTREMITY COMPARISON IMAGING STUDY: ICD-10-CM

## 2025-08-08 DIAGNOSIS — M25.561 RIGHT KNEE PAIN, UNSPECIFIED CHRONICITY: ICD-10-CM

## 2025-08-08 PROCEDURE — 99203 OFFICE O/P NEW LOW 30 MIN: CPT | Performed by: ORTHOPAEDIC SURGERY
